# Patient Record
Sex: FEMALE | Race: WHITE | URBAN - METROPOLITAN AREA
[De-identification: names, ages, dates, MRNs, and addresses within clinical notes are randomized per-mention and may not be internally consistent; named-entity substitution may affect disease eponyms.]

---

## 2017-01-01 ENCOUNTER — NURSING HOME VISIT (OUTPATIENT)
Dept: GERIATRICS | Facility: CLINIC | Age: 82
End: 2017-01-01
Payer: COMMERCIAL

## 2017-01-01 ENCOUNTER — APPOINTMENT (OUTPATIENT)
Dept: GENERAL RADIOLOGY | Facility: CLINIC | Age: 82
End: 2017-01-01
Attending: EMERGENCY MEDICINE
Payer: COMMERCIAL

## 2017-01-01 ENCOUNTER — APPOINTMENT (OUTPATIENT)
Dept: OCCUPATIONAL THERAPY | Facility: CLINIC | Age: 82
DRG: 329 | End: 2017-01-01
Attending: INTERNAL MEDICINE
Payer: COMMERCIAL

## 2017-01-01 ENCOUNTER — TRANSFERRED RECORDS (OUTPATIENT)
Dept: HEALTH INFORMATION MANAGEMENT | Facility: CLINIC | Age: 82
End: 2017-01-01

## 2017-01-01 ENCOUNTER — APPOINTMENT (OUTPATIENT)
Dept: CARDIOLOGY | Facility: CLINIC | Age: 82
DRG: 329 | End: 2017-01-01
Attending: INTERNAL MEDICINE
Payer: COMMERCIAL

## 2017-01-01 ENCOUNTER — APPOINTMENT (OUTPATIENT)
Dept: CT IMAGING | Facility: CLINIC | Age: 82
DRG: 329 | End: 2017-01-01
Attending: EMERGENCY MEDICINE
Payer: COMMERCIAL

## 2017-01-01 ENCOUNTER — APPOINTMENT (OUTPATIENT)
Dept: PHYSICAL THERAPY | Facility: CLINIC | Age: 82
DRG: 329 | End: 2017-01-01
Payer: COMMERCIAL

## 2017-01-01 ENCOUNTER — ANESTHESIA EVENT (OUTPATIENT)
Dept: SURGERY | Facility: CLINIC | Age: 82
DRG: 329 | End: 2017-01-01
Payer: COMMERCIAL

## 2017-01-01 ENCOUNTER — HOSPITAL ENCOUNTER (EMERGENCY)
Facility: CLINIC | Age: 82
Discharge: HOME | End: 2017-12-06
Attending: EMERGENCY MEDICINE | Admitting: EMERGENCY MEDICINE
Payer: COMMERCIAL

## 2017-01-01 ENCOUNTER — ANESTHESIA (OUTPATIENT)
Dept: SURGERY | Facility: CLINIC | Age: 82
DRG: 329 | End: 2017-01-01
Payer: COMMERCIAL

## 2017-01-01 ENCOUNTER — HOSPITAL ENCOUNTER (INPATIENT)
Facility: CLINIC | Age: 82
LOS: 7 days | Discharge: NURSING, REHAB OR LONG-TERM CARE | DRG: 329 | End: 2017-12-27
Attending: EMERGENCY MEDICINE | Admitting: INTERNAL MEDICINE
Payer: COMMERCIAL

## 2017-01-01 ENCOUNTER — APPOINTMENT (OUTPATIENT)
Dept: GENERAL RADIOLOGY | Facility: CLINIC | Age: 82
DRG: 329 | End: 2017-01-01
Attending: EMERGENCY MEDICINE
Payer: COMMERCIAL

## 2017-01-01 ENCOUNTER — APPOINTMENT (OUTPATIENT)
Dept: CT IMAGING | Facility: CLINIC | Age: 82
End: 2017-01-01
Attending: EMERGENCY MEDICINE
Payer: COMMERCIAL

## 2017-01-01 ENCOUNTER — APPOINTMENT (OUTPATIENT)
Dept: GENERAL RADIOLOGY | Facility: CLINIC | Age: 82
DRG: 329 | End: 2017-01-01
Attending: INTERNAL MEDICINE
Payer: COMMERCIAL

## 2017-01-01 ENCOUNTER — APPOINTMENT (OUTPATIENT)
Dept: PHYSICAL THERAPY | Facility: CLINIC | Age: 82
DRG: 329 | End: 2017-01-01
Attending: COLON & RECTAL SURGERY
Payer: COMMERCIAL

## 2017-01-01 VITALS
WEIGHT: 130.2 LBS | RESPIRATION RATE: 18 BRPM | TEMPERATURE: 99.6 F | SYSTOLIC BLOOD PRESSURE: 155 MMHG | HEIGHT: 58 IN | DIASTOLIC BLOOD PRESSURE: 60 MMHG | OXYGEN SATURATION: 92 % | HEART RATE: 97 BPM | BODY MASS INDEX: 27.33 KG/M2

## 2017-01-01 VITALS
DIASTOLIC BLOOD PRESSURE: 68 MMHG | RESPIRATION RATE: 18 BRPM | HEART RATE: 94 BPM | TEMPERATURE: 98.5 F | HEIGHT: 58 IN | OXYGEN SATURATION: 96 % | SYSTOLIC BLOOD PRESSURE: 149 MMHG | WEIGHT: 141.6 LBS | BODY MASS INDEX: 29.72 KG/M2

## 2017-01-01 VITALS
OXYGEN SATURATION: 92 % | DIASTOLIC BLOOD PRESSURE: 61 MMHG | TEMPERATURE: 98 F | WEIGHT: 133.38 LBS | RESPIRATION RATE: 18 BRPM | BODY MASS INDEX: 28 KG/M2 | HEIGHT: 58 IN | HEART RATE: 91 BPM | SYSTOLIC BLOOD PRESSURE: 150 MMHG

## 2017-01-01 VITALS
TEMPERATURE: 97.6 F | HEIGHT: 59 IN | OXYGEN SATURATION: 95 % | HEART RATE: 86 BPM | RESPIRATION RATE: 16 BRPM | SYSTOLIC BLOOD PRESSURE: 131 MMHG | BODY MASS INDEX: 26.21 KG/M2 | DIASTOLIC BLOOD PRESSURE: 68 MMHG | WEIGHT: 130 LBS

## 2017-01-01 DIAGNOSIS — F10.920 ALCOHOLIC INTOXICATION WITHOUT COMPLICATION (H): ICD-10-CM

## 2017-01-01 DIAGNOSIS — M17.12 ARTHRITIS OF LEFT KNEE: ICD-10-CM

## 2017-01-01 DIAGNOSIS — R14.1 FLATULENCE, ERUCTATION AND GAS PAIN: ICD-10-CM

## 2017-01-01 DIAGNOSIS — R53.81 PHYSICAL DECONDITIONING: Primary | ICD-10-CM

## 2017-01-01 DIAGNOSIS — C18.5 MALIGNANT NEOPLASM OF SPLENIC FLEXURE (H): Primary | ICD-10-CM

## 2017-01-01 DIAGNOSIS — I10 ESSENTIAL HYPERTENSION: ICD-10-CM

## 2017-01-01 DIAGNOSIS — R14.3 FLATULENCE, ERUCTATION AND GAS PAIN: ICD-10-CM

## 2017-01-01 DIAGNOSIS — C18.5 MALIGNANT NEOPLASM OF SPLENIC FLEXURE (H): ICD-10-CM

## 2017-01-01 DIAGNOSIS — E43 SEVERE PROTEIN-CALORIE MALNUTRITION (H): ICD-10-CM

## 2017-01-01 DIAGNOSIS — K63.89 COLONIC MASS: ICD-10-CM

## 2017-01-01 DIAGNOSIS — K57.30 DIVERTICULOSIS OF LARGE INTESTINE WITHOUT HEMORRHAGE: ICD-10-CM

## 2017-01-01 DIAGNOSIS — R53.81 PHYSICAL DECONDITIONING: ICD-10-CM

## 2017-01-01 DIAGNOSIS — G47.00 INSOMNIA, UNSPECIFIED TYPE: ICD-10-CM
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
## 2017-01-01 DIAGNOSIS — K56.609 LARGE BOWEL OBSTRUCTION (H): ICD-10-CM

## 2017-01-01 DIAGNOSIS — Z93.3 COLOSTOMY IN PLACE (H): ICD-10-CM

## 2017-01-01 DIAGNOSIS — Z93.3 S/P COLOSTOMY (H): ICD-10-CM

## 2017-01-01 DIAGNOSIS — M79.662 PAIN OF LEFT LOWER LEG: ICD-10-CM

## 2017-01-01 DIAGNOSIS — R30.0 DYSURIA: ICD-10-CM

## 2017-01-01 DIAGNOSIS — R14.2 FLATULENCE, ERUCTATION AND GAS PAIN: ICD-10-CM

## 2017-01-01 DIAGNOSIS — R10.13 EPIGASTRIC ABDOMINAL PAIN: ICD-10-CM

## 2017-01-01 DIAGNOSIS — C19 COLORECTAL CANCER (H): ICD-10-CM

## 2017-01-01 DIAGNOSIS — Z71.89 ADVANCED DIRECTIVES, COUNSELING/DISCUSSION: ICD-10-CM

## 2017-01-01 LAB
ABO + RH BLD: NORMAL
ABO + RH BLD: NORMAL
ALBUMIN SERPL-MCNC: 2.8 G/DL (ref 3.4–5)
ALBUMIN SERPL-MCNC: 3 G/DL (ref 3.4–5)
ALBUMIN UR-MCNC: 30 MG/DL
ALBUMIN UR-MCNC: NEGATIVE MG/DL
ALP SERPL-CCNC: 81 U/L (ref 40–150)
ALP SERPL-CCNC: 90 U/L (ref 40–150)
ALT SERPL W P-5'-P-CCNC: 12 U/L (ref 0–50)
ALT SERPL W P-5'-P-CCNC: 15 U/L (ref 0–50)
ANION GAP SERPL CALCULATED.3IONS-SCNC: 10 MMOL/L (ref 3–14)
ANION GAP SERPL CALCULATED.3IONS-SCNC: 8 MMOL/L (ref 3–14)
APPEARANCE UR: ABNORMAL
APPEARANCE UR: CLEAR
AST SERPL W P-5'-P-CCNC: 10 U/L (ref 0–45)
AST SERPL W P-5'-P-CCNC: 16 U/L (ref 0–45)
BACTERIA #/AREA URNS HPF: ABNORMAL /HPF
BASOPHILS # BLD AUTO: 0 10E9/L (ref 0–0.2)
BASOPHILS # BLD AUTO: 0 10E9/L (ref 0–0.2)
BASOPHILS NFR BLD AUTO: 0.1 %
BASOPHILS NFR BLD AUTO: 0.4 %
BILIRUB SERPL-MCNC: 0.2 MG/DL (ref 0.2–1.3)
BILIRUB SERPL-MCNC: 0.8 MG/DL (ref 0.2–1.3)
BILIRUB UR QL STRIP: NEGATIVE
BILIRUB UR QL STRIP: NEGATIVE
BLD GP AB SCN SERPL QL: NORMAL
BLOOD BANK CMNT PATIENT-IMP: NORMAL
BUN SERPL-MCNC: 13 MG/DL (ref 7–30)
BUN SERPL-MCNC: 15 MG/DL (ref 7–30)
BUN SERPL-MCNC: 18 MG/DL (ref 7–30)
BUN SERPL-MCNC: 5 MG/DL (ref 7–30)
CALCIUM SERPL-MCNC: 7.5 MG/DL (ref 8.5–10.1)
CALCIUM SERPL-MCNC: 8 MG/DL (ref 8.5–10.1)
CALCIUM SERPL-MCNC: 8.4 MG/DL (ref 8.5–10.1)
CALCIUM SERPL-MCNC: 9.4 MG/DL (ref 8.5–10.1)
CEA SERPL-MCNC: 7.1 UG/L (ref 0–2.5)
CHLORIDE SERPL-SCNC: 106 MMOL/L (ref 94–109)
CHLORIDE SERPL-SCNC: 110 MMOL/L (ref 94–109)
CHLORIDE SERPL-SCNC: 114 MMOL/L (ref 94–109)
CHLORIDE SERPLBLD-SCNC: 104 MMOL/L (ref 94–109)
CO2 SERPL-SCNC: 21 MMOL/L (ref 20–32)
CO2 SERPL-SCNC: 22 MMOL/L (ref 20–32)
CO2 SERPL-SCNC: 23 MMOL/L (ref 20–32)
CO2 SERPL-SCNC: 28 MMOL/L (ref 20–32)
COLOR UR AUTO: ABNORMAL
COLOR UR AUTO: YELLOW
COPATH REPORT: NORMAL
COPATH REPORT: NORMAL
CREAT SERPL-MCNC: 0.51 MG/DL (ref 0.52–1.04)
CREAT SERPL-MCNC: 0.58 MG/DL (ref 0.52–1.04)
CREAT SERPL-MCNC: 0.68 MG/DL (ref 0.52–1.04)
CREAT SERPL-MCNC: 0.68 MG/DL (ref 0.52–1.04)
CREAT SERPL-MCNC: 0.72 MG/DL (ref 0.52–1.04)
CREAT SERPL-MCNC: 0.89 MG/DL (ref 0.52–1.04)
DIFFERENTIAL METHOD BLD: ABNORMAL
DIFFERENTIAL METHOD BLD: ABNORMAL
DIFFERENTIAL: ABNORMAL
EOSINOPHIL # BLD AUTO: 0 10E9/L (ref 0–0.7)
EOSINOPHIL # BLD AUTO: 0.2 10E9/L (ref 0–0.7)
EOSINOPHIL NFR BLD AUTO: 0.1 %
EOSINOPHIL NFR BLD AUTO: 2.9 %
ERYTHROCYTE [DISTWIDTH] IN BLOOD BY AUTOMATED COUNT: 17.8 % (ref 10–15)
ERYTHROCYTE [DISTWIDTH] IN BLOOD BY AUTOMATED COUNT: 17.9 % (ref 10–15)
ERYTHROCYTE [DISTWIDTH] IN BLOOD BY AUTOMATED COUNT: 18.1 % (ref 10–15)
ERYTHROCYTE [DISTWIDTH] IN BLOOD BY AUTOMATED COUNT: 19.2 % (ref 10–15)
ETHANOL SERPL-MCNC: 0.14 G/DL
GFR SERPL CREATININE-BSD FRML MDRD: 59 ML/MIN/1.7M2
GFR SERPL CREATININE-BSD FRML MDRD: 76 ML/MIN/1.7M2
GFR SERPL CREATININE-BSD FRML MDRD: 80 ML/MIN/1.7M2
GFR SERPL CREATININE-BSD FRML MDRD: 81 ML/MIN/1.7M2
GFR SERPL CREATININE-BSD FRML MDRD: >90 ML/MIN/1.73M2
GFR SERPL CREATININE-BSD FRML MDRD: >90 ML/MIN/1.7M2
GLUCOSE SERPL-MCNC: 102 MG/DL (ref 70–99)
GLUCOSE SERPL-MCNC: 120 MG/DL (ref 70–99)
GLUCOSE SERPL-MCNC: 140 MG/DL (ref 70–99)
GLUCOSE SERPL-MCNC: 75 MG/DL (ref 70–99)
GLUCOSE SERPL-MCNC: 95 MG/DL (ref 70–99)
GLUCOSE UR STRIP-MCNC: NEGATIVE MG/DL
GLUCOSE UR STRIP-MCNC: NEGATIVE MG/DL
HCT VFR BLD AUTO: 27.6 % (ref 35–47)
HCT VFR BLD AUTO: 31 % (ref 35–47)
HCT VFR BLD AUTO: 34.4 % (ref 35–47)
HCT VFR BLD AUTO: 36.8 % (ref 35–47)
HEMOGLOBIN: 8.4 G/DL (ref 11.7–15.7)
HGB BLD-MCNC: 10.4 G/DL (ref 11.7–15.7)
HGB BLD-MCNC: 11.5 G/DL (ref 11.7–15.7)
HGB BLD-MCNC: 8.8 G/DL (ref 11.7–15.7)
HGB BLD-MCNC: 9.3 G/DL (ref 11.7–15.7)
HGB UR QL STRIP: NEGATIVE
HGB UR QL STRIP: NEGATIVE
IMM GRANULOCYTES # BLD: 0 10E9/L (ref 0–0.4)
IMM GRANULOCYTES # BLD: 0.1 10E9/L (ref 0–0.4)
IMM GRANULOCYTES NFR BLD: 0.4 %
IMM GRANULOCYTES NFR BLD: 0.5 %
INR PPP: 1.02 (ref 0.86–1.14)
INTERPRETATION ECG - MUSE: NORMAL
KETONES UR STRIP-MCNC: NEGATIVE MG/DL
KETONES UR STRIP-MCNC: NEGATIVE MG/DL
LEUKOCYTE ESTERASE UR QL STRIP: ABNORMAL
LEUKOCYTE ESTERASE UR QL STRIP: ABNORMAL
LIPASE SERPL-CCNC: 233 U/L (ref 73–393)
LIPASE SERPL-CCNC: 272 U/L (ref 73–393)
LYMPHOCYTES # BLD AUTO: 1 10E9/L (ref 0.8–5.3)
LYMPHOCYTES # BLD AUTO: 1.8 10E9/L (ref 0.8–5.3)
LYMPHOCYTES NFR BLD AUTO: 23.2 %
LYMPHOCYTES NFR BLD AUTO: 6.8 %
MCH RBC QN AUTO: 22.5 PG (ref 26.5–33)
MCH RBC QN AUTO: 22.9 PG (ref 26.5–33)
MCH RBC QN AUTO: 23 PG (ref 26.5–33)
MCH RBC QN AUTO: 23.5 PG (ref 26.5–33)
MCHC RBC AUTO-ENTMCNC: 30 G/DL (ref 31.5–36.5)
MCHC RBC AUTO-ENTMCNC: 30.2 G/DL (ref 31.5–36.5)
MCHC RBC AUTO-ENTMCNC: 30.4 G/DL (ref 31.5–36.5)
MCHC RBC AUTO-ENTMCNC: 31.3 G/DL (ref 31.5–36.5)
MCV RBC AUTO: 74 FL (ref 78–100)
MCV RBC AUTO: 74 FL (ref 78–100)
MCV RBC AUTO: 76 FL (ref 78–100)
MCV RBC AUTO: 78 FL (ref 78–100)
MONOCYTES # BLD AUTO: 0.5 10E9/L (ref 0–1.3)
MONOCYTES # BLD AUTO: 1 10E9/L (ref 0–1.3)
MONOCYTES NFR BLD AUTO: 6.1 %
MONOCYTES NFR BLD AUTO: 6.8 %
MUCOUS THREADS #/AREA URNS LPF: PRESENT /LPF
NEUTROPHILS # BLD AUTO: 12.7 10E9/L (ref 1.6–8.3)
NEUTROPHILS # BLD AUTO: 5 10E9/L (ref 1.6–8.3)
NEUTROPHILS NFR BLD AUTO: 67 %
NEUTROPHILS NFR BLD AUTO: 85.7 %
NITRATE UR QL: NEGATIVE
NITRATE UR QL: NEGATIVE
NRBC # BLD AUTO: 0 10*3/UL
NRBC # BLD AUTO: 0 10*3/UL
NRBC BLD AUTO-RTO: 0 /100
NRBC BLD AUTO-RTO: 0 /100
PH UR STRIP: 5.5 PH (ref 5–7)
PH UR STRIP: 6 PH (ref 5–7)
PLATELET # BLD AUTO: 239 10E9/L (ref 150–450)
PLATELET # BLD AUTO: 252 10^9/L (ref 150–450)
PLATELET # BLD AUTO: 257 10E9/L (ref 150–450)
PLATELET # BLD AUTO: 280 10E9/L (ref 150–450)
PLATELET # BLD AUTO: 292 10E9/L (ref 150–450)
PLATELET # BLD AUTO: 313 10E9/L (ref 150–450)
PLATELET # BLD AUTO: 335 10E9/L (ref 150–450)
POTASSIUM SERPL-SCNC: 2.7 MMOL/L (ref 3.4–5.3)
POTASSIUM SERPL-SCNC: 3.7 MMOL/L (ref 3.4–5.3)
POTASSIUM SERPL-SCNC: 3.8 MMOL/L (ref 3.4–5.3)
POTASSIUM SERPL-SCNC: 4.2 MMOL/L (ref 3.4–5.3)
PROT SERPL-MCNC: 6.6 G/DL (ref 6.8–8.8)
PROT SERPL-MCNC: 7 G/DL (ref 6.8–8.8)
RBC # BLD AUTO: 3.73 10^12/L (ref 3.8–5.2)
RBC # BLD AUTO: 4.07 10E12/L (ref 3.8–5.2)
RBC # BLD AUTO: 4.42 10E12/L (ref 3.8–5.2)
RBC # BLD AUTO: 5 10E12/L (ref 3.8–5.2)
RBC #/AREA URNS AUTO: 1 /HPF (ref 0–2)
RBC #/AREA URNS AUTO: 2 /HPF (ref 0–2)
SODIUM SERPL-SCNC: 136 MMOL/L (ref 133–144)
SODIUM SERPL-SCNC: 140 MMOL/L (ref 133–144)
SODIUM SERPL-SCNC: 143 MMOL/L (ref 133–144)
SODIUM SERPL-SCNC: 143 MMOL/L (ref 133–144)
SOURCE: ABNORMAL
SOURCE: ABNORMAL
SP GR UR STRIP: 1 (ref 1–1.03)
SP GR UR STRIP: 1 (ref 1–1.03)
SPECIMEN EXP DATE BLD: NORMAL
SQUAMOUS #/AREA URNS AUTO: 24 /HPF (ref 0–1)
SQUAMOUS #/AREA URNS AUTO: 3 /HPF (ref 0–1)
TROPONIN I SERPL-MCNC: 0.02 UG/L (ref 0–0.04)
TROPONIN I SERPL-MCNC: 0.03 UG/L (ref 0–0.04)
UROBILINOGEN UR STRIP-MCNC: 2 MG/DL (ref 0–2)
UROBILINOGEN UR STRIP-MCNC: NORMAL MG/DL (ref 0–2)
WBC # BLD AUTO: 10.5 10^9/L (ref 4–11)
WBC # BLD AUTO: 14.8 10E9/L (ref 4–11)
WBC # BLD AUTO: 7.5 10E9/L (ref 4–11)
WBC # BLD AUTO: 9.3 10E9/L (ref 4–11)
WBC #/AREA URNS AUTO: 3 /HPF (ref 0–2)
WBC #/AREA URNS AUTO: 4 /HPF (ref 0–2)

## 2017-01-01 PROCEDURE — 71020 XR CHEST 2 VW: CPT

## 2017-01-01 PROCEDURE — 99223 1ST HOSP IP/OBS HIGH 75: CPT | Mod: AI | Performed by: INTERNAL MEDICINE

## 2017-01-01 PROCEDURE — 88305 TISSUE EXAM BY PATHOLOGIST: CPT | Performed by: COLON & RECTAL SURGERY

## 2017-01-01 PROCEDURE — 80320 DRUG SCREEN QUANTALCOHOLS: CPT | Performed by: EMERGENCY MEDICINE

## 2017-01-01 PROCEDURE — 25000125 ZZHC RX 250: Performed by: NURSE ANESTHETIST, CERTIFIED REGISTERED

## 2017-01-01 PROCEDURE — 25000128 H RX IP 250 OP 636: Performed by: INTERNAL MEDICINE

## 2017-01-01 PROCEDURE — 99207 ZZC CDG-MDM COMPONENT: MEETS MODERATE - UP CODED: CPT | Performed by: INTERNAL MEDICINE

## 2017-01-01 PROCEDURE — 99233 SBSQ HOSP IP/OBS HIGH 50: CPT | Performed by: INTERNAL MEDICINE

## 2017-01-01 PROCEDURE — 36415 COLL VENOUS BLD VENIPUNCTURE: CPT | Performed by: COLON & RECTAL SURGERY

## 2017-01-01 PROCEDURE — 12000007 ZZH R&B INTERMEDIATE

## 2017-01-01 PROCEDURE — 86900 BLOOD TYPING SEROLOGIC ABO: CPT | Performed by: ANESTHESIOLOGY

## 2017-01-01 PROCEDURE — 83690 ASSAY OF LIPASE: CPT | Performed by: EMERGENCY MEDICINE

## 2017-01-01 PROCEDURE — 97116 GAIT TRAINING THERAPY: CPT | Mod: GP | Performed by: PHYSICAL THERAPIST

## 2017-01-01 PROCEDURE — 0DBW4ZX EXCISION OF PERITONEUM, PERCUTANEOUS ENDOSCOPIC APPROACH, DIAGNOSTIC: ICD-10-PCS | Performed by: COLON & RECTAL SURGERY

## 2017-01-01 PROCEDURE — 99212 OFFICE O/P EST SF 10 MIN: CPT

## 2017-01-01 PROCEDURE — 25000128 H RX IP 250 OP 636: Performed by: COLON & RECTAL SURGERY

## 2017-01-01 PROCEDURE — 27210794 ZZH OR GENERAL SUPPLY STERILE: Performed by: COLON & RECTAL SURGERY

## 2017-01-01 PROCEDURE — 40000902 ZZH STATISTIC WOC PT EDUCATION, 16-30 MIN

## 2017-01-01 PROCEDURE — 25000125 ZZHC RX 250: Performed by: INTERNAL MEDICINE

## 2017-01-01 PROCEDURE — 25000125 ZZHC RX 250: Performed by: EMERGENCY MEDICINE

## 2017-01-01 PROCEDURE — 97162 PT EVAL MOD COMPLEX 30 MIN: CPT | Mod: GP | Performed by: PHYSICAL THERAPIST

## 2017-01-01 PROCEDURE — 85025 COMPLETE CBC W/AUTO DIFF WBC: CPT | Performed by: EMERGENCY MEDICINE

## 2017-01-01 PROCEDURE — 40000170 ZZH STATISTIC PRE-PROCEDURE ASSESSMENT II: Performed by: COLON & RECTAL SURGERY

## 2017-01-01 PROCEDURE — 36415 COLL VENOUS BLD VENIPUNCTURE: CPT | Performed by: INTERNAL MEDICINE

## 2017-01-01 PROCEDURE — 84484 ASSAY OF TROPONIN QUANT: CPT | Performed by: EMERGENCY MEDICINE

## 2017-01-01 PROCEDURE — 88342 IMHCHEM/IMCYTCHM 1ST ANTB: CPT | Performed by: COLON & RECTAL SURGERY

## 2017-01-01 PROCEDURE — 36000093 ZZH SURGERY LEVEL 4 1ST 30 MIN: Performed by: COLON & RECTAL SURGERY

## 2017-01-01 PROCEDURE — 96361 HYDRATE IV INFUSION ADD-ON: CPT

## 2017-01-01 PROCEDURE — 71010 XR CHEST PORT 1 VW: CPT

## 2017-01-01 PROCEDURE — 25000125 ZZHC RX 250: Performed by: COLON & RECTAL SURGERY

## 2017-01-01 PROCEDURE — 25000128 H RX IP 250 OP 636: Performed by: ANESTHESIOLOGY

## 2017-01-01 PROCEDURE — 85049 AUTOMATED PLATELET COUNT: CPT | Performed by: COLON & RECTAL SURGERY

## 2017-01-01 PROCEDURE — 25000132 ZZH RX MED GY IP 250 OP 250 PS 637: Performed by: INTERNAL MEDICINE

## 2017-01-01 PROCEDURE — 88360 TUMOR IMMUNOHISTOCHEM/MANUAL: CPT | Performed by: COLON & RECTAL SURGERY

## 2017-01-01 PROCEDURE — 81001 URINALYSIS AUTO W/SCOPE: CPT | Performed by: EMERGENCY MEDICINE

## 2017-01-01 PROCEDURE — 99207 ZZC DOWN CODE DUE TO INITIAL EXAM: CPT | Performed by: INTERNAL MEDICINE

## 2017-01-01 PROCEDURE — 88341 IMHCHEM/IMCYTCHM EA ADD ANTB: CPT | Performed by: COLON & RECTAL SURGERY

## 2017-01-01 PROCEDURE — 81001 URINALYSIS AUTO W/SCOPE: CPT | Mod: XU | Performed by: EMERGENCY MEDICINE

## 2017-01-01 PROCEDURE — 88305 TISSUE EXAM BY PATHOLOGIST: CPT | Mod: 26 | Performed by: COLON & RECTAL SURGERY

## 2017-01-01 PROCEDURE — 25000128 H RX IP 250 OP 636: Performed by: NURSE ANESTHETIST, CERTIFIED REGISTERED

## 2017-01-01 PROCEDURE — 25000128 H RX IP 250 OP 636: Performed by: EMERGENCY MEDICINE

## 2017-01-01 PROCEDURE — 80053 COMPREHEN METABOLIC PANEL: CPT | Performed by: EMERGENCY MEDICINE

## 2017-01-01 PROCEDURE — 25000566 ZZH SEVOFLURANE, EA 15 MIN: Performed by: COLON & RECTAL SURGERY

## 2017-01-01 PROCEDURE — 85027 COMPLETE CBC AUTOMATED: CPT | Performed by: INTERNAL MEDICINE

## 2017-01-01 PROCEDURE — 81288 MLH1 GENE: CPT | Performed by: COLON & RECTAL SURGERY

## 2017-01-01 PROCEDURE — 40000193 ZZH STATISTIC PT WARD VISIT: Performed by: PHYSICAL THERAPIST

## 2017-01-01 PROCEDURE — 25000132 ZZH RX MED GY IP 250 OP 250 PS 637

## 2017-01-01 PROCEDURE — 97110 THERAPEUTIC EXERCISES: CPT | Mod: GP

## 2017-01-01 PROCEDURE — 25000132 ZZH RX MED GY IP 250 OP 250 PS 637: Performed by: EMERGENCY MEDICINE

## 2017-01-01 PROCEDURE — 25000132 ZZH RX MED GY IP 250 OP 250 PS 637: Performed by: SURGERY

## 2017-01-01 PROCEDURE — 40000193 ZZH STATISTIC PT WARD VISIT

## 2017-01-01 PROCEDURE — S0028 INJECTION, FAMOTIDINE, 20 MG: HCPCS | Performed by: EMERGENCY MEDICINE

## 2017-01-01 PROCEDURE — 0D1L4Z4 BYPASS TRANSVERSE COLON TO CUTANEOUS, PERCUTANEOUS ENDOSCOPIC APPROACH: ICD-10-PCS | Performed by: COLON & RECTAL SURGERY

## 2017-01-01 PROCEDURE — 85610 PROTHROMBIN TIME: CPT | Performed by: EMERGENCY MEDICINE

## 2017-01-01 PROCEDURE — 40000133 ZZH STATISTIC OT WARD VISIT

## 2017-01-01 PROCEDURE — 97530 THERAPEUTIC ACTIVITIES: CPT | Mod: GO

## 2017-01-01 PROCEDURE — 25000125 ZZHC RX 250: Performed by: ANESTHESIOLOGY

## 2017-01-01 PROCEDURE — 96376 TX/PRO/DX INJ SAME DRUG ADON: CPT

## 2017-01-01 PROCEDURE — 88360 TUMOR IMMUNOHISTOCHEM/MANUAL: CPT | Mod: 26 | Performed by: COLON & RECTAL SURGERY

## 2017-01-01 PROCEDURE — 88342 IMHCHEM/IMCYTCHM 1ST ANTB: CPT | Mod: 26 | Performed by: COLON & RECTAL SURGERY

## 2017-01-01 PROCEDURE — 00000159 ZZHCL STATISTIC H-SEND OUTS PREP: Performed by: COLON & RECTAL SURGERY

## 2017-01-01 PROCEDURE — 96375 TX/PRO/DX INJ NEW DRUG ADDON: CPT

## 2017-01-01 PROCEDURE — 40000983 ZZH STATISTIC HFNC ADULT NON-CPAP

## 2017-01-01 PROCEDURE — 99285 EMERGENCY DEPT VISIT HI MDM: CPT | Mod: 25

## 2017-01-01 PROCEDURE — 82565 ASSAY OF CREATININE: CPT | Performed by: COLON & RECTAL SURGERY

## 2017-01-01 PROCEDURE — 82947 ASSAY GLUCOSE BLOOD QUANT: CPT | Performed by: COLON & RECTAL SURGERY

## 2017-01-01 PROCEDURE — 99232 SBSQ HOSP IP/OBS MODERATE 35: CPT | Performed by: INTERNAL MEDICINE

## 2017-01-01 PROCEDURE — 99213 OFFICE O/P EST LOW 20 MIN: CPT

## 2017-01-01 PROCEDURE — 93306 TTE W/DOPPLER COMPLETE: CPT

## 2017-01-01 PROCEDURE — 93005 ELECTROCARDIOGRAM TRACING: CPT

## 2017-01-01 PROCEDURE — 85018 HEMOGLOBIN: CPT | Performed by: COLON & RECTAL SURGERY

## 2017-01-01 PROCEDURE — 71000012 ZZH RECOVERY PHASE 1 LEVEL 1 FIRST HR: Performed by: COLON & RECTAL SURGERY

## 2017-01-01 PROCEDURE — 93306 TTE W/DOPPLER COMPLETE: CPT | Mod: 26 | Performed by: INTERNAL MEDICINE

## 2017-01-01 PROCEDURE — 74177 CT ABD & PELVIS W/CONTRAST: CPT

## 2017-01-01 PROCEDURE — 97530 THERAPEUTIC ACTIVITIES: CPT | Mod: GP

## 2017-01-01 PROCEDURE — 37000009 ZZH ANESTHESIA TECHNICAL FEE, EACH ADDTL 15 MIN: Performed by: COLON & RECTAL SURGERY

## 2017-01-01 PROCEDURE — 80048 BASIC METABOLIC PNL TOTAL CA: CPT | Performed by: INTERNAL MEDICINE

## 2017-01-01 PROCEDURE — 37000008 ZZH ANESTHESIA TECHNICAL FEE, 1ST 30 MIN: Performed by: COLON & RECTAL SURGERY

## 2017-01-01 PROCEDURE — 99310 SBSQ NF CARE HIGH MDM 45: CPT | Performed by: NURSE PRACTITIONER

## 2017-01-01 PROCEDURE — 97166 OT EVAL MOD COMPLEX 45 MIN: CPT | Mod: GO

## 2017-01-01 PROCEDURE — 25000125 ZZHC RX 250

## 2017-01-01 PROCEDURE — 82378 CARCINOEMBRYONIC ANTIGEN: CPT | Performed by: EMERGENCY MEDICINE

## 2017-01-01 PROCEDURE — 36000063 ZZH SURGERY LEVEL 4 EA 15 ADDTL MIN: Performed by: COLON & RECTAL SURGERY

## 2017-01-01 PROCEDURE — 71000013 ZZH RECOVERY PHASE 1 LEVEL 1 EA ADDTL HR: Performed by: COLON & RECTAL SURGERY

## 2017-01-01 PROCEDURE — 99306 1ST NF CARE HIGH MDM 50: CPT | Performed by: INTERNAL MEDICINE

## 2017-01-01 PROCEDURE — 93010 ELECTROCARDIOGRAM REPORT: CPT | Performed by: INTERNAL MEDICINE

## 2017-01-01 PROCEDURE — 40000141 ZZH STATISTIC PERIPHERAL IV START W/O US GUIDANCE

## 2017-01-01 PROCEDURE — 99211 OFF/OP EST MAY X REQ PHY/QHP: CPT

## 2017-01-01 PROCEDURE — 82378 CARCINOEMBRYONIC ANTIGEN: CPT | Performed by: PHYSICIAN ASSISTANT

## 2017-01-01 PROCEDURE — 00000158 ZZHCL STATISTIC H-FISH PROCESS B/S: Performed by: COLON & RECTAL SURGERY

## 2017-01-01 PROCEDURE — 86901 BLOOD TYPING SEROLOGIC RH(D): CPT | Performed by: ANESTHESIOLOGY

## 2017-01-01 PROCEDURE — 40000901 ZZH STATISTIC WOC PT EDUCATION, 0-15 MIN

## 2017-01-01 PROCEDURE — 99239 HOSP IP/OBS DSCHRG MGMT >30: CPT | Performed by: INTERNAL MEDICINE

## 2017-01-01 PROCEDURE — 88341 IMHCHEM/IMCYTCHM EA ADD ANTB: CPT | Mod: 26 | Performed by: COLON & RECTAL SURGERY

## 2017-01-01 PROCEDURE — 96374 THER/PROPH/DIAG INJ IV PUSH: CPT | Mod: 59

## 2017-01-01 PROCEDURE — 40000275 ZZH STATISTIC RCP TIME EA 10 MIN

## 2017-01-01 PROCEDURE — 27210995 ZZH RX 272: Performed by: COLON & RECTAL SURGERY

## 2017-01-01 PROCEDURE — 70450 CT HEAD/BRAIN W/O DYE: CPT

## 2017-01-01 PROCEDURE — 88377 M/PHMTRC ALYS ISHQUANT/SEMIQ: CPT | Performed by: PATHOLOGY

## 2017-01-01 PROCEDURE — 97530 THERAPEUTIC ACTIVITIES: CPT | Mod: GP | Performed by: PHYSICAL THERAPIST

## 2017-01-01 PROCEDURE — 25000125 ZZHC RX 250: Performed by: PHYSICIAN ASSISTANT

## 2017-01-01 PROCEDURE — 86850 RBC ANTIBODY SCREEN: CPT | Performed by: ANESTHESIOLOGY

## 2017-01-01 PROCEDURE — 97535 SELF CARE MNGMENT TRAINING: CPT | Mod: GO

## 2017-01-01 RX ORDER — PANTOPRAZOLE SODIUM 40 MG/1
40 TABLET, DELAYED RELEASE ORAL
Status: DISCONTINUED | OUTPATIENT
Start: 2017-01-01 | End: 2017-01-01 | Stop reason: HOSPADM

## 2017-01-01 RX ORDER — CIPROFLOXACIN 2 MG/ML
400 INJECTION, SOLUTION INTRAVENOUS SEE ADMIN INSTRUCTIONS
Status: DISCONTINUED | OUTPATIENT
Start: 2017-01-01 | End: 2017-01-01 | Stop reason: HOSPADM

## 2017-01-01 RX ORDER — CIPROFLOXACIN 2 MG/ML
400 INJECTION, SOLUTION INTRAVENOUS
Status: DISCONTINUED | OUTPATIENT
Start: 2017-01-01 | End: 2017-01-01 | Stop reason: HOSPADM

## 2017-01-01 RX ORDER — IOPAMIDOL 755 MG/ML
78 INJECTION, SOLUTION INTRAVASCULAR ONCE
Status: COMPLETED | OUTPATIENT
Start: 2017-01-01 | End: 2017-01-01

## 2017-01-01 RX ORDER — SODIUM CHLORIDE 9 MG/ML
INJECTION, SOLUTION INTRAVENOUS CONTINUOUS
Status: DISCONTINUED | OUTPATIENT
Start: 2017-01-01 | End: 2017-01-01

## 2017-01-01 RX ORDER — POLYETHYLENE GLYCOL 3350 17 G/17G
17 POWDER, FOR SOLUTION ORAL DAILY PRN
Status: DISCONTINUED | OUTPATIENT
Start: 2017-01-01 | End: 2017-01-01

## 2017-01-01 RX ORDER — ONDANSETRON 2 MG/ML
4 INJECTION INTRAMUSCULAR; INTRAVENOUS EVERY 30 MIN PRN
Status: DISCONTINUED | OUTPATIENT
Start: 2017-01-01 | End: 2017-01-01

## 2017-01-01 RX ORDER — POTASSIUM CHLORIDE 1.5 G/1.58G
20-40 POWDER, FOR SOLUTION ORAL
Status: DISCONTINUED | OUTPATIENT
Start: 2017-01-01 | End: 2017-01-01 | Stop reason: HOSPADM

## 2017-01-01 RX ORDER — BISACODYL 10 MG
10 SUPPOSITORY, RECTAL RECTAL DAILY PRN
Status: DISCONTINUED | OUTPATIENT
Start: 2017-01-01 | End: 2017-01-01

## 2017-01-01 RX ORDER — ACETAMINOPHEN 10 MG/ML
1000 INJECTION, SOLUTION INTRAVENOUS EVERY 6 HOURS
Status: DISCONTINUED | OUTPATIENT
Start: 2017-01-01 | End: 2017-01-01

## 2017-01-01 RX ORDER — POTASSIUM CHLORIDE 7.45 MG/ML
10 INJECTION INTRAVENOUS
Status: DISCONTINUED | OUTPATIENT
Start: 2017-01-01 | End: 2017-01-01 | Stop reason: RX

## 2017-01-01 RX ORDER — MORPHINE SULFATE 4 MG/ML
4 INJECTION, SOLUTION INTRAMUSCULAR; INTRAVENOUS
Status: DISCONTINUED | OUTPATIENT
Start: 2017-01-01 | End: 2017-01-01

## 2017-01-01 RX ORDER — ONDANSETRON 4 MG/1
4 TABLET, ORALLY DISINTEGRATING ORAL EVERY 6 HOURS PRN
Status: DISCONTINUED | OUTPATIENT
Start: 2017-01-01 | End: 2017-01-01 | Stop reason: HOSPADM

## 2017-01-01 RX ORDER — FENTANYL CITRATE 50 UG/ML
INJECTION, SOLUTION INTRAMUSCULAR; INTRAVENOUS PRN
Status: DISCONTINUED | OUTPATIENT
Start: 2017-01-01 | End: 2017-01-01

## 2017-01-01 RX ORDER — NEOSTIGMINE METHYLSULFATE 1 MG/ML
VIAL (ML) INJECTION PRN
Status: DISCONTINUED | OUTPATIENT
Start: 2017-01-01 | End: 2017-01-01

## 2017-01-01 RX ORDER — ACETAMINOPHEN 325 MG/1
650 TABLET ORAL EVERY 4 HOURS PRN
Status: DISCONTINUED | OUTPATIENT
Start: 2017-01-01 | End: 2017-01-01 | Stop reason: HOSPADM

## 2017-01-01 RX ORDER — HYDROMORPHONE HYDROCHLORIDE 1 MG/ML
.3-.5 INJECTION, SOLUTION INTRAMUSCULAR; INTRAVENOUS; SUBCUTANEOUS EVERY 5 MIN PRN
Status: DISCONTINUED | OUTPATIENT
Start: 2017-01-01 | End: 2017-01-01

## 2017-01-01 RX ORDER — HYDROMORPHONE HYDROCHLORIDE 1 MG/ML
0.2 INJECTION, SOLUTION INTRAMUSCULAR; INTRAVENOUS; SUBCUTANEOUS EVERY 5 MIN PRN
Status: DISCONTINUED | OUTPATIENT
Start: 2017-01-01 | End: 2017-01-01 | Stop reason: HOSPADM

## 2017-01-01 RX ORDER — ONDANSETRON 2 MG/ML
4 INJECTION INTRAMUSCULAR; INTRAVENOUS EVERY 6 HOURS PRN
Status: DISCONTINUED | OUTPATIENT
Start: 2017-01-01 | End: 2017-01-01 | Stop reason: HOSPADM

## 2017-01-01 RX ORDER — AMLODIPINE AND OLMESARTAN MEDOXOMIL 10; 40 MG/1; MG/1
1 TABLET ORAL DAILY
COMMUNITY

## 2017-01-01 RX ORDER — POTASSIUM CHLORIDE 29.8 MG/ML
20 INJECTION INTRAVENOUS
Status: DISCONTINUED | OUTPATIENT
Start: 2017-01-01 | End: 2017-01-01 | Stop reason: HOSPADM

## 2017-01-01 RX ORDER — MORPHINE SULFATE 4 MG/ML
4 INJECTION, SOLUTION INTRAMUSCULAR; INTRAVENOUS ONCE
Status: COMPLETED | OUTPATIENT
Start: 2017-01-01 | End: 2017-01-01

## 2017-01-01 RX ORDER — BISACODYL 10 MG
10 SUPPOSITORY, RECTAL RECTAL DAILY PRN
Status: ON HOLD | COMMUNITY
End: 2017-01-01

## 2017-01-01 RX ORDER — PHENAZOPYRIDINE HYDROCHLORIDE 100 MG/1
100 TABLET, FILM COATED ORAL ONCE
Status: COMPLETED | OUTPATIENT
Start: 2017-01-01 | End: 2017-01-01

## 2017-01-01 RX ORDER — MAGNESIUM HYDROXIDE 1200 MG/15ML
LIQUID ORAL PRN
Status: DISCONTINUED | OUTPATIENT
Start: 2017-01-01 | End: 2017-01-01 | Stop reason: HOSPADM

## 2017-01-01 RX ORDER — SIMETHICONE 80 MG
80 TABLET,CHEWABLE ORAL EVERY 6 HOURS PRN
Qty: 180 TABLET | DISCHARGE
Start: 2017-01-01

## 2017-01-01 RX ORDER — ONDANSETRON 2 MG/ML
4 INJECTION INTRAMUSCULAR; INTRAVENOUS EVERY 30 MIN PRN
Status: DISCONTINUED | OUTPATIENT
Start: 2017-01-01 | End: 2017-01-01 | Stop reason: HOSPADM

## 2017-01-01 RX ORDER — LIDOCAINE 40 MG/G
CREAM TOPICAL
Status: DISCONTINUED | OUTPATIENT
Start: 2017-01-01 | End: 2017-01-01 | Stop reason: HOSPADM

## 2017-01-01 RX ORDER — POLYETHYLENE GLYCOL 3350 17 G/17G
17 POWDER, FOR SOLUTION ORAL DAILY
Status: ON HOLD | COMMUNITY
End: 2017-01-01

## 2017-01-01 RX ORDER — POTASSIUM CL/LIDO/0.9 % NACL 10MEQ/0.1L
10 INTRAVENOUS SOLUTION, PIGGYBACK (ML) INTRAVENOUS
Status: DISCONTINUED | OUTPATIENT
Start: 2017-01-01 | End: 2017-01-01 | Stop reason: HOSPADM

## 2017-01-01 RX ORDER — SULFAMETHOXAZOLE/TRIMETHOPRIM 800-160 MG
1 TABLET ORAL ONCE
Status: COMPLETED | OUTPATIENT
Start: 2017-01-01 | End: 2017-01-01

## 2017-01-01 RX ORDER — SODIUM CHLORIDE, SODIUM LACTATE, POTASSIUM CHLORIDE, CALCIUM CHLORIDE 600; 310; 30; 20 MG/100ML; MG/100ML; MG/100ML; MG/100ML
INJECTION, SOLUTION INTRAVENOUS CONTINUOUS
Status: DISCONTINUED | OUTPATIENT
Start: 2017-01-01 | End: 2017-01-01 | Stop reason: HOSPADM

## 2017-01-01 RX ORDER — DEXAMETHASONE SODIUM PHOSPHATE 4 MG/ML
INJECTION, SOLUTION INTRA-ARTICULAR; INTRALESIONAL; INTRAMUSCULAR; INTRAVENOUS; SOFT TISSUE PRN
Status: DISCONTINUED | OUTPATIENT
Start: 2017-01-01 | End: 2017-01-01

## 2017-01-01 RX ORDER — MEPERIDINE HYDROCHLORIDE 25 MG/ML
12.5 INJECTION INTRAMUSCULAR; INTRAVENOUS; SUBCUTANEOUS EVERY 5 MIN PRN
Status: DISCONTINUED | OUTPATIENT
Start: 2017-01-01 | End: 2017-01-01 | Stop reason: HOSPADM

## 2017-01-01 RX ORDER — NALOXONE HYDROCHLORIDE 0.4 MG/ML
.1-.4 INJECTION, SOLUTION INTRAMUSCULAR; INTRAVENOUS; SUBCUTANEOUS
Status: ACTIVE | OUTPATIENT
Start: 2017-01-01 | End: 2017-01-01

## 2017-01-01 RX ORDER — ONDANSETRON 2 MG/ML
INJECTION INTRAMUSCULAR; INTRAVENOUS PRN
Status: DISCONTINUED | OUTPATIENT
Start: 2017-01-01 | End: 2017-01-01

## 2017-01-01 RX ORDER — NALOXONE HYDROCHLORIDE 0.4 MG/ML
.1-.4 INJECTION, SOLUTION INTRAMUSCULAR; INTRAVENOUS; SUBCUTANEOUS
Status: DISCONTINUED | OUTPATIENT
Start: 2017-01-01 | End: 2017-01-01

## 2017-01-01 RX ORDER — HYDRALAZINE HYDROCHLORIDE 20 MG/ML
2.5-5 INJECTION INTRAMUSCULAR; INTRAVENOUS EVERY 10 MIN PRN
Status: DISCONTINUED | OUTPATIENT
Start: 2017-01-01 | End: 2017-01-01 | Stop reason: HOSPADM

## 2017-01-01 RX ORDER — FENTANYL CITRATE 0.05 MG/ML
25-50 INJECTION, SOLUTION INTRAMUSCULAR; INTRAVENOUS
Status: DISCONTINUED | OUTPATIENT
Start: 2017-01-01 | End: 2017-01-01 | Stop reason: HOSPADM

## 2017-01-01 RX ORDER — ACETAMINOPHEN 650 MG/1
650 SUPPOSITORY RECTAL EVERY 4 HOURS PRN
Status: DISCONTINUED | OUTPATIENT
Start: 2017-01-01 | End: 2017-01-01 | Stop reason: HOSPADM

## 2017-01-01 RX ORDER — SIMETHICONE 80 MG
80 TABLET,CHEWABLE ORAL EVERY 6 HOURS PRN
Status: DISCONTINUED | OUTPATIENT
Start: 2017-01-01 | End: 2017-01-01 | Stop reason: HOSPADM

## 2017-01-01 RX ORDER — PROCHLORPERAZINE 25 MG
12.5 SUPPOSITORY, RECTAL RECTAL EVERY 12 HOURS PRN
Status: DISCONTINUED | OUTPATIENT
Start: 2017-01-01 | End: 2017-01-01 | Stop reason: HOSPADM

## 2017-01-01 RX ORDER — ALBUTEROL SULFATE 0.83 MG/ML
2.5 SOLUTION RESPIRATORY (INHALATION) EVERY 4 HOURS PRN
Status: DISCONTINUED | OUTPATIENT
Start: 2017-01-01 | End: 2017-01-01 | Stop reason: HOSPADM

## 2017-01-01 RX ORDER — ACETAMINOPHEN 500 MG
1000 TABLET ORAL EVERY 8 HOURS
Status: DISCONTINUED | OUTPATIENT
Start: 2017-01-01 | End: 2017-01-01 | Stop reason: HOSPADM

## 2017-01-01 RX ORDER — PROPOFOL 10 MG/ML
INJECTION, EMULSION INTRAVENOUS PRN
Status: DISCONTINUED | OUTPATIENT
Start: 2017-01-01 | End: 2017-01-01

## 2017-01-01 RX ORDER — NALOXONE HYDROCHLORIDE 0.4 MG/ML
.1-.4 INJECTION, SOLUTION INTRAMUSCULAR; INTRAVENOUS; SUBCUTANEOUS
Status: DISCONTINUED | OUTPATIENT
Start: 2017-01-01 | End: 2017-01-01 | Stop reason: HOSPADM

## 2017-01-01 RX ORDER — SULFAMETHOXAZOLE/TRIMETHOPRIM 800-160 MG
1 TABLET ORAL 2 TIMES DAILY
Qty: 14 TABLET | Refills: 0 | Status: SHIPPED | OUTPATIENT
Start: 2017-01-01 | End: 2017-01-01

## 2017-01-01 RX ORDER — GLYCOPYRROLATE 0.2 MG/ML
INJECTION, SOLUTION INTRAMUSCULAR; INTRAVENOUS PRN
Status: DISCONTINUED | OUTPATIENT
Start: 2017-01-01 | End: 2017-01-01

## 2017-01-01 RX ORDER — HYDROMORPHONE HYDROCHLORIDE 1 MG/ML
.3-.5 INJECTION, SOLUTION INTRAMUSCULAR; INTRAVENOUS; SUBCUTANEOUS
Status: DISCONTINUED | OUTPATIENT
Start: 2017-01-01 | End: 2017-01-01 | Stop reason: HOSPADM

## 2017-01-01 RX ORDER — LIDOCAINE HYDROCHLORIDE 20 MG/ML
INJECTION, SOLUTION INFILTRATION; PERINEURAL PRN
Status: DISCONTINUED | OUTPATIENT
Start: 2017-01-01 | End: 2017-01-01

## 2017-01-01 RX ORDER — POTASSIUM CHLORIDE 1500 MG/1
20-40 TABLET, EXTENDED RELEASE ORAL
Status: DISCONTINUED | OUTPATIENT
Start: 2017-01-01 | End: 2017-01-01 | Stop reason: HOSPADM

## 2017-01-01 RX ORDER — ALBUTEROL SULFATE 0.83 MG/ML
2.5 SOLUTION RESPIRATORY (INHALATION)
Status: COMPLETED | OUTPATIENT
Start: 2017-01-01 | End: 2017-01-01

## 2017-01-01 RX ORDER — HYDRALAZINE HYDROCHLORIDE 20 MG/ML
10 INJECTION INTRAMUSCULAR; INTRAVENOUS EVERY 4 HOURS PRN
Status: DISCONTINUED | OUTPATIENT
Start: 2017-01-01 | End: 2017-01-01 | Stop reason: HOSPADM

## 2017-01-01 RX ORDER — CIPROFLOXACIN 2 MG/ML
400 INJECTION, SOLUTION INTRAVENOUS EVERY 12 HOURS
Status: COMPLETED | OUTPATIENT
Start: 2017-01-01 | End: 2017-01-01

## 2017-01-01 RX ORDER — ONDANSETRON 4 MG/1
4 TABLET, ORALLY DISINTEGRATING ORAL EVERY 30 MIN PRN
Status: DISCONTINUED | OUTPATIENT
Start: 2017-01-01 | End: 2017-01-01 | Stop reason: HOSPADM

## 2017-01-01 RX ORDER — PROCHLORPERAZINE MALEATE 5 MG
5 TABLET ORAL EVERY 6 HOURS PRN
Status: DISCONTINUED | OUTPATIENT
Start: 2017-01-01 | End: 2017-01-01 | Stop reason: HOSPADM

## 2017-01-01 RX ORDER — LIDOCAINE 40 MG/G
CREAM TOPICAL
Status: DISCONTINUED | OUTPATIENT
Start: 2017-01-01 | End: 2017-01-01

## 2017-01-01 RX ORDER — SODIUM CHLORIDE 9 MG/ML
1000 INJECTION, SOLUTION INTRAVENOUS CONTINUOUS
Status: DISCONTINUED | OUTPATIENT
Start: 2017-01-01 | End: 2017-01-01

## 2017-01-01 RX ADMIN — HYDROMORPHONE HYDROCHLORIDE 0.5 MG: 1 INJECTION, SOLUTION INTRAMUSCULAR; INTRAVENOUS; SUBCUTANEOUS at 20:02

## 2017-01-01 RX ADMIN — HYDROMORPHONE HYDROCHLORIDE 0.5 MG: 1 INJECTION, SOLUTION INTRAMUSCULAR; INTRAVENOUS; SUBCUTANEOUS at 10:53

## 2017-01-01 RX ADMIN — ACETAMINOPHEN 1000 MG: 10 INJECTION, SOLUTION INTRAVENOUS at 02:35

## 2017-01-01 RX ADMIN — METRONIDAZOLE 500 MG: 500 INJECTION, SOLUTION INTRAVENOUS at 06:53

## 2017-01-01 RX ADMIN — ONDANSETRON 4 MG: 2 INJECTION INTRAMUSCULAR; INTRAVENOUS at 22:14

## 2017-01-01 RX ADMIN — CIPROFLOXACIN 400 MG: 2 INJECTION, SOLUTION INTRAVENOUS at 21:18

## 2017-01-01 RX ADMIN — HYDROMORPHONE HYDROCHLORIDE 0.5 MG: 1 INJECTION, SOLUTION INTRAMUSCULAR; INTRAVENOUS; SUBCUTANEOUS at 20:25

## 2017-01-01 RX ADMIN — PANTOPRAZOLE SODIUM 40 MG: 40 INJECTION, POWDER, FOR SOLUTION INTRAVENOUS at 06:47

## 2017-01-01 RX ADMIN — ACETAMINOPHEN 1000 MG: 10 INJECTION, SOLUTION INTRAVENOUS at 23:51

## 2017-01-01 RX ADMIN — SIMETHICONE CHEW TAB 80 MG 80 MG: 80 TABLET ORAL at 22:02

## 2017-01-01 RX ADMIN — ACETAMINOPHEN 1000 MG: 10 INJECTION, SOLUTION INTRAVENOUS at 13:15

## 2017-01-01 RX ADMIN — CISATRACURIUM BESYLATE 2 MG: 2 INJECTION INTRAVENOUS at 21:01

## 2017-01-01 RX ADMIN — HYDROMORPHONE HYDROCHLORIDE 0.5 MG: 1 INJECTION, SOLUTION INTRAMUSCULAR; INTRAVENOUS; SUBCUTANEOUS at 08:48

## 2017-01-01 RX ADMIN — FENTANYL CITRATE 50 MCG: 50 INJECTION, SOLUTION INTRAMUSCULAR; INTRAVENOUS at 20:46

## 2017-01-01 RX ADMIN — DEXMEDETOMIDINE HYDROCHLORIDE 0.3 MCG/KG/HR: 100 INJECTION, SOLUTION INTRAVENOUS at 20:45

## 2017-01-01 RX ADMIN — LIDOCAINE HYDROCHLORIDE 60 MG: 20 INJECTION, SOLUTION INFILTRATION; PERINEURAL at 20:46

## 2017-01-01 RX ADMIN — ACETAMINOPHEN 1000 MG: 500 TABLET, FILM COATED ORAL at 07:35

## 2017-01-01 RX ADMIN — ENOXAPARIN SODIUM 40 MG: 40 INJECTION SUBCUTANEOUS at 18:11

## 2017-01-01 RX ADMIN — PANTOPRAZOLE SODIUM 40 MG: 40 TABLET, DELAYED RELEASE ORAL at 06:41

## 2017-01-01 RX ADMIN — SODIUM CHLORIDE 64 ML: 9 INJECTION, SOLUTION INTRAVENOUS at 10:38

## 2017-01-01 RX ADMIN — SODIUM CHLORIDE: 9 INJECTION, SOLUTION INTRAVENOUS at 02:34

## 2017-01-01 RX ADMIN — SODIUM CHLORIDE, PRESERVATIVE FREE: 5 INJECTION INTRAVENOUS at 22:44

## 2017-01-01 RX ADMIN — ALBUTEROL SULFATE 2.5 MG: 2.5 SOLUTION RESPIRATORY (INHALATION) at 19:53

## 2017-01-01 RX ADMIN — ACETAMINOPHEN 1000 MG: 10 INJECTION, SOLUTION INTRAVENOUS at 01:37

## 2017-01-01 RX ADMIN — ACETAMINOPHEN 1000 MG: 10 INJECTION, SOLUTION INTRAVENOUS at 03:20

## 2017-01-01 RX ADMIN — ACETAMINOPHEN 1000 MG: 10 INJECTION, SOLUTION INTRAVENOUS at 15:07

## 2017-01-01 RX ADMIN — SULFAMETHOXAZOLE AND TRIMETHOPRIM 1 TABLET: 800; 160 TABLET ORAL at 06:46

## 2017-01-01 RX ADMIN — ENOXAPARIN SODIUM 40 MG: 40 INJECTION SUBCUTANEOUS at 16:45

## 2017-01-01 RX ADMIN — MORPHINE SULFATE 4 MG: 4 INJECTION, SOLUTION INTRAMUSCULAR; INTRAVENOUS at 22:43

## 2017-01-01 RX ADMIN — SODIUM CHLORIDE, POTASSIUM CHLORIDE, SODIUM LACTATE AND CALCIUM CHLORIDE: 600; 310; 30; 20 INJECTION, SOLUTION INTRAVENOUS at 22:00

## 2017-01-01 RX ADMIN — ACETAMINOPHEN 1000 MG: 500 TABLET, FILM COATED ORAL at 15:35

## 2017-01-01 RX ADMIN — METRONIDAZOLE 500 MG: 500 INJECTION, SOLUTION INTRAVENOUS at 20:54

## 2017-01-01 RX ADMIN — HYDROMORPHONE HYDROCHLORIDE 0.5 MG: 1 INJECTION, SOLUTION INTRAMUSCULAR; INTRAVENOUS; SUBCUTANEOUS at 23:04

## 2017-01-01 RX ADMIN — HYDROMORPHONE HYDROCHLORIDE 0.5 MG: 1 INJECTION, SOLUTION INTRAMUSCULAR; INTRAVENOUS; SUBCUTANEOUS at 15:12

## 2017-01-01 RX ADMIN — SIMETHICONE CHEW TAB 80 MG 80 MG: 80 TABLET ORAL at 12:12

## 2017-01-01 RX ADMIN — DEXAMETHASONE SODIUM PHOSPHATE 4 MG: 4 INJECTION, SOLUTION INTRA-ARTICULAR; INTRALESIONAL; INTRAMUSCULAR; INTRAVENOUS; SOFT TISSUE at 20:54

## 2017-01-01 RX ADMIN — SODIUM CHLORIDE: 9 INJECTION, SOLUTION INTRAVENOUS at 08:41

## 2017-01-01 RX ADMIN — CIPROFLOXACIN 400 MG: 2 INJECTION, SOLUTION INTRAVENOUS at 09:00

## 2017-01-01 RX ADMIN — DEXMEDETOMIDINE HYDROCHLORIDE 4 MCG: 100 INJECTION, SOLUTION INTRAVENOUS at 20:41

## 2017-01-01 RX ADMIN — ENOXAPARIN SODIUM 40 MG: 40 INJECTION SUBCUTANEOUS at 17:48

## 2017-01-01 RX ADMIN — HYDROMORPHONE HYDROCHLORIDE 0.3 MG: 1 INJECTION, SOLUTION INTRAMUSCULAR; INTRAVENOUS; SUBCUTANEOUS at 03:18

## 2017-01-01 RX ADMIN — PANTOPRAZOLE SODIUM 40 MG: 40 INJECTION, POWDER, FOR SOLUTION INTRAVENOUS at 07:03

## 2017-01-01 RX ADMIN — PANTOPRAZOLE SODIUM 40 MG: 40 INJECTION, POWDER, FOR SOLUTION INTRAVENOUS at 06:58

## 2017-01-01 RX ADMIN — FENTANYL CITRATE 50 MCG: 50 INJECTION, SOLUTION INTRAMUSCULAR; INTRAVENOUS at 21:20

## 2017-01-01 RX ADMIN — PHENAZOPYRIDINE HYDROCHLORIDE 100 MG: 100 TABLET ORAL at 07:39

## 2017-01-01 RX ADMIN — NEOSTIGMINE METHYLSULFATE 3 MG: 1 INJECTION INTRAMUSCULAR; INTRAVENOUS; SUBCUTANEOUS at 22:12

## 2017-01-01 RX ADMIN — ACETAMINOPHEN 1000 MG: 10 INJECTION, SOLUTION INTRAVENOUS at 17:35

## 2017-01-01 RX ADMIN — ACETAMINOPHEN 1000 MG: 500 TABLET, FILM COATED ORAL at 06:41

## 2017-01-01 RX ADMIN — Medication 1 MG: at 22:07

## 2017-01-01 RX ADMIN — ACETAMINOPHEN 1000 MG: 10 INJECTION, SOLUTION INTRAVENOUS at 14:38

## 2017-01-01 RX ADMIN — ACETAMINOPHEN 1000 MG: 10 INJECTION, SOLUTION INTRAVENOUS at 19:40

## 2017-01-01 RX ADMIN — ACETAMINOPHEN 1000 MG: 10 INJECTION, SOLUTION INTRAVENOUS at 21:16

## 2017-01-01 RX ADMIN — ONDANSETRON 4 MG: 2 INJECTION INTRAMUSCULAR; INTRAVENOUS at 10:14

## 2017-01-01 RX ADMIN — ACETAMINOPHEN 1000 MG: 10 INJECTION, SOLUTION INTRAVENOUS at 06:14

## 2017-01-01 RX ADMIN — MORPHINE SULFATE 4 MG: 4 INJECTION, SOLUTION INTRAMUSCULAR; INTRAVENOUS at 10:16

## 2017-01-01 RX ADMIN — LIDOCAINE HYDROCHLORIDE 40 MG: 20 INJECTION, SOLUTION INFILTRATION; PERINEURAL at 22:19

## 2017-01-01 RX ADMIN — PANTOPRAZOLE SODIUM 40 MG: 40 TABLET, DELAYED RELEASE ORAL at 07:29

## 2017-01-01 RX ADMIN — LIDOCAINE HYDROCHLORIDE 10 ML: 20 JELLY TOPICAL at 12:00

## 2017-01-01 RX ADMIN — PHENYLEPHRINE HYDROCHLORIDE 50 MCG: 10 INJECTION INTRAVENOUS at 21:03

## 2017-01-01 RX ADMIN — HYDROMORPHONE HYDROCHLORIDE 0.3 MG: 1 INJECTION, SOLUTION INTRAMUSCULAR; INTRAVENOUS; SUBCUTANEOUS at 02:08

## 2017-01-01 RX ADMIN — SODIUM CHLORIDE: 9 INJECTION, SOLUTION INTRAVENOUS at 02:33

## 2017-01-01 RX ADMIN — ACETAMINOPHEN 1000 MG: 10 INJECTION, SOLUTION INTRAVENOUS at 07:03

## 2017-01-01 RX ADMIN — HYDRALAZINE HYDROCHLORIDE 10 MG: 20 INJECTION INTRAMUSCULAR; INTRAVENOUS at 01:52

## 2017-01-01 RX ADMIN — PANTOPRAZOLE SODIUM 40 MG: 40 INJECTION, POWDER, FOR SOLUTION INTRAVENOUS at 06:20

## 2017-01-01 RX ADMIN — FAMOTIDINE 20 MG: 10 INJECTION, SOLUTION INTRAVENOUS at 04:25

## 2017-01-01 RX ADMIN — DEXMEDETOMIDINE HYDROCHLORIDE 8 MCG: 100 INJECTION, SOLUTION INTRAVENOUS at 21:24

## 2017-01-01 RX ADMIN — HYDROMORPHONE HYDROCHLORIDE 0.5 MG: 1 INJECTION, SOLUTION INTRAMUSCULAR; INTRAVENOUS; SUBCUTANEOUS at 06:14

## 2017-01-01 RX ADMIN — ENOXAPARIN SODIUM 40 MG: 40 INJECTION SUBCUTANEOUS at 17:36

## 2017-01-01 RX ADMIN — PROPOFOL 120 MG: 10 INJECTION, EMULSION INTRAVENOUS at 20:46

## 2017-01-01 RX ADMIN — CISATRACURIUM BESYLATE 2 MG: 2 INJECTION INTRAVENOUS at 21:37

## 2017-01-01 RX ADMIN — METRONIDAZOLE 500 MG: 500 INJECTION, SOLUTION INTRAVENOUS at 00:50

## 2017-01-01 RX ADMIN — SODIUM CHLORIDE, POTASSIUM CHLORIDE, SODIUM LACTATE AND CALCIUM CHLORIDE: 600; 310; 30; 20 INJECTION, SOLUTION INTRAVENOUS at 19:38

## 2017-01-01 RX ADMIN — SODIUM CHLORIDE, PRESERVATIVE FREE: 5 INJECTION INTRAVENOUS at 08:13

## 2017-01-01 RX ADMIN — SODIUM CHLORIDE, PRESERVATIVE FREE: 5 INJECTION INTRAVENOUS at 17:18

## 2017-01-01 RX ADMIN — METRONIDAZOLE 500 MG: 500 INJECTION, SOLUTION INTRAVENOUS at 15:06

## 2017-01-01 RX ADMIN — ACETAMINOPHEN 1000 MG: 500 TABLET, FILM COATED ORAL at 13:34

## 2017-01-01 RX ADMIN — GLYCOPYRROLATE 0.4 MG: 0.2 INJECTION, SOLUTION INTRAMUSCULAR; INTRAVENOUS at 22:12

## 2017-01-01 RX ADMIN — TOPICAL ANESTHETIC 0.5 ML: 200 SPRAY DENTAL; PERIODONTAL at 12:02

## 2017-01-01 RX ADMIN — LIDOCAINE HYDROCHLORIDE 30 ML: 20 SOLUTION ORAL; TOPICAL at 04:25

## 2017-01-01 RX ADMIN — SODIUM CHLORIDE: 9 INJECTION, SOLUTION INTRAVENOUS at 19:45

## 2017-01-01 RX ADMIN — DEXMEDETOMIDINE HYDROCHLORIDE 4 MCG: 100 INJECTION, SOLUTION INTRAVENOUS at 20:35

## 2017-01-01 RX ADMIN — ACETAMINOPHEN 1000 MG: 10 INJECTION, SOLUTION INTRAVENOUS at 08:38

## 2017-01-01 RX ADMIN — ACETAMINOPHEN 1000 MG: 10 INJECTION, SOLUTION INTRAVENOUS at 09:51

## 2017-01-01 RX ADMIN — SODIUM CHLORIDE: 9 INJECTION, SOLUTION INTRAVENOUS at 14:38

## 2017-01-01 RX ADMIN — IOPAMIDOL 78 ML: 755 INJECTION, SOLUTION INTRAVENOUS at 10:39

## 2017-01-01 RX ADMIN — LIDOCAINE HYDROCHLORIDE 10 ML: 20 JELLY TOPICAL at 19:29

## 2017-01-01 RX ADMIN — ACETAMINOPHEN 1000 MG: 500 TABLET, FILM COATED ORAL at 22:02

## 2017-01-01 RX ADMIN — ACETAMINOPHEN 1000 MG: 500 TABLET, FILM COATED ORAL at 14:17

## 2017-01-01 RX ADMIN — SUCCINYLCHOLINE CHLORIDE 80 MG: 20 INJECTION, SOLUTION INTRAMUSCULAR; INTRAVENOUS at 20:46

## 2017-01-01 RX ADMIN — PANTOPRAZOLE SODIUM 40 MG: 40 INJECTION, POWDER, FOR SOLUTION INTRAVENOUS at 07:05

## 2017-01-01 RX ADMIN — Medication 1 ML: at 06:28

## 2017-01-01 RX ADMIN — MORPHINE SULFATE 4 MG: 4 INJECTION, SOLUTION INTRAMUSCULAR; INTRAVENOUS at 12:00

## 2017-01-01 RX ADMIN — DEXMEDETOMIDINE HYDROCHLORIDE 4 MCG: 100 INJECTION, SOLUTION INTRAVENOUS at 20:37

## 2017-01-01 RX ADMIN — HYDROMORPHONE HYDROCHLORIDE 0.2 MG: 1 INJECTION, SOLUTION INTRAMUSCULAR; INTRAVENOUS; SUBCUTANEOUS at 00:12

## 2017-01-01 RX ADMIN — ACETAMINOPHEN 1000 MG: 500 TABLET, FILM COATED ORAL at 21:54

## 2017-01-01 RX ADMIN — ENOXAPARIN SODIUM 40 MG: 40 INJECTION SUBCUTANEOUS at 17:16

## 2017-01-01 RX ADMIN — CIPROFLOXACIN 400 MG: 2 INJECTION, SOLUTION INTRAVENOUS at 20:58

## 2017-01-01 RX ADMIN — SODIUM CHLORIDE 1000 ML: 9 INJECTION, SOLUTION INTRAVENOUS at 10:12

## 2017-01-01 RX ADMIN — Medication 1 MG: at 00:21

## 2017-01-01 ASSESSMENT — ENCOUNTER SYMPTOMS
VOMITING: 1
DIARRHEA: 0
APPETITE CHANGE: 1
NAUSEA: 1
FEVER: 0
DYSURIA: 0
ABDOMINAL DISTENTION: 1
ABDOMINAL PAIN: 1
HEMATURIA: 0
CONSTIPATION: 1

## 2017-12-06 NOTE — ED AVS SNAPSHOT
Emergency Department    [address] [facility] [address]    Phone:  [phone]    Fax:  [phone]                                       Mary Grace Rivers   MRN: [id number]    Department:   Emergency Department   Date of Visit:  12/6/2017           Patient Information     Date Of Birth          6/14/1925        Your diagnoses for this visit were:     Fall, initial encounter     Alcoholic intoxication without complication (H)     Epigastric abdominal pain     Dysuria        You were seen by Ac Mendoza MD.      Follow-up Information     Follow up with Your doctor at [facility]. Schedule an appointment as soon as possible for a visit in 2 days.        Follow up with  Emergency Department.    Specialty:  EMERGENCY MEDICINE    Why:  If symptoms worsen    Contact information:    [address] [facility] [id number] [phone]        Discharge Instructions         Alcohol Intoxication  Alcohol intoxication occurs when you drink alcohol faster than your liver can remove it from your system. The following facts are important to remember:    It can take 10 minutes or more to start to feel the effects of a drink, so you can easily get more intoxicated than you intended.    One drink may be more than 1 serving of alcohol. Depending on the drink, it can be 2 to 4 servings.    It takes about an hour for your body to metabolize (clear) 1 serving. If you have more than 1 drink, it can take a couple of hours or more.    Many things affect how drinks will affect you, including whether you ve eaten, how fast you drink, your size, how much you normally drink (or not), medicines you take, chronic diseases you have, and gender.  Signs and symptoms of alcohol poisoning  The following are signs and symptoms of alcohol poisoning:  Mild impairment    Reduced inhibitions    Slurred speech    Drowsiness    Decreased fine motor skills  Moderate impairment    Erratic behavior, aggression,  "depression    Impaired judgment    Confusion    Concentration difficulties    Coordination problems  Severe impairment    Vomiting    Seizures    Unconsciousness    Cold, clammy    Slow or irregular breathing    Hypothermia (low body temperature)    Coma  Health effects  Alcohol abuse causes health problems. Sometimes this can happen after only drinking a  little.\" There is no set number of drinks or amount of alcohol that defines too much. The more you drink at one time, and the more frequently you drink determine both the short-term and long-term health effects. It affects all parts of your body and your health, including your:    Brain. Alcohol is a central nervous system depressant. It can damage parts of the brain that affect your balance, memory, thinking, and emotions. It can cause memory loss, blackouts, depression, agitation, sleep cycle changes, and seizures. These changes may or may not be reversible.    Heart and vascular system. Alcohol affects multiple areas. It can damage heart muscle causing cardiomyopathy, which is a weakening and stretching of the heart muscle. This can lead to trouble breathing, an irregular heartbeat, atrial fibrillation, leg swelling, and heart failure. It makes the blood vessels stiffen causing hypertension (high blood pressure). All of these problems increase your risk of having heart attacks or strokes.    Liver. Alcohol causes fat to build up in the liver, affecting its normal function. This increases the risk for hepatitis, leading to abdominal pain, appetite loss, jaundice, bleeding problems, liver fibrosis, and cirrhosis. This in turn can affect your ability to fight off infections, and can cause diabetes. The liver changes prevent it from removing toxins in your blood that can cause encephalopathy. Signs of this are confusion, altered level of consciousness, personality changes, memory loss, seizures, coma, and death.    Pancreas. Alcohol can cause inflammation of the " pancreas, or pancreatitis. This can cause pain in your abdomen, fever, and diabetes.    Immune system. Alcohol weakens your immune system in a number of ways. It suppresses your immune system making it harder to fight off infections and colds. You will also have a higher risk of certain infections like pneumonia and tuberculosis.    Cancer risk. Alcohol raises your risk of cancer of the mouth, esophagus, pharynx, larynx, liver, and breast.    Sexual function. Alcohol abuse can also lead to sexual problems.  Alcohol use during pregnancy may cause permanent damage to the growing baby.  Home care  The following guidelines will help you care for yourself at home:    Don't drink any more alcohol.    Don't drive until all effects of the alcohol have worn off.    Don't operate machinery that can cause injuries.    Get lots of rest over the next few days. Drink plenty of water and other non-alcoholic liquids. Try to eat regular meals.    If you have been drinking heavily on a daily basis, you may go through alcohol withdrawal. The usual symptoms last 3 to 4 days and may include nervousness, shakiness, nausea, sweating, sleeplessness, and can even cause seizures and a serious withdrawal symptom called delirium tremens, or DTs. During this time, it is best that you stay with family or friends who can help and support you. You can also admit yourself to a residential detox program. If your symptoms are severe (seizures, severe shakiness, confusion), contact your doctor or call an ambulance for help (see below).   Follow-up care  If alcohol is a problem in your life, these are some organizations that can help you:    Alcoholics Anonymous offers support through a self-help fellowship. There are no dues or fees. See the Yellow Pages and call for time and place of meetings. Find AA online at www.aa.org.    [employer] offers support to families of alcohol users. Contact 426-248-2231, or online at www.al-anocarie.org.    [employer] [place]  on Alcoholism and Drug Dependence can be reached at 747-597-1513, or online at www.ncadd.org.    There are also inpatient and residential alcohol detox programs. Check the Internet or phonebook Yellow Pages under  Drug Abuse and Treatment Centers.   Call 911  Call 911 if any of these occur:    Trouble breathing or slow irregular breathing    Chest pain    Sudden weakness on one side of your body or sudden trouble speaking    Heavy bleeding or vomiting blood    Very drowsy or trouble awakening    Fainting or loss of consciousness    Rapid heart rate    Seizure  When to seek medical advice  Call your healthcare provider right away if any of these occur:    Severe shakiness     Fever over 100.4  F (38.0  C)    Confusion or hallucinations (seeing, hearing, or feeling things that are not there)    Pain in your upper abdomen that gets worse    Repeated vomiting  Date Last Reviewed: 6/1/2016 2000-2017 [employer]. [address] [place] [address]. All rights reserved. This information is not intended as a substitute for professional medical care. Always follow your healthcare professional's instructions.          Uncertain Causes of Fall  You have had a fall today. But the cause of your fall is not certain. Falls can occur due to slipping, tripping or losing your balance. A fall can also occur from a fainting spell or seizure.  While a fall can happen for a simple reason (tripping over something), falls in elderly people are often caused by a combination of things:    Age-related decline in function with worsening balance, stability, vision, and muscle strength    Chronic illness such as heart arrhythmias, heart valve disease, vascular disease, COPD, diabetes, strokes, arthritis    Effects or side effects of medicines    Dehydration.    Environmental hazards such as uneven or slippery ground, unfamiliar place, obstacles, uneven surfaces, or slippery ground    Situational factors (related to the  activity being done, e.g., rushing to the bathroom)  Because the cause of your fall today is not certain, it is possible that a fainting spell or seizure was the cause. This means that it could happen again, without warning. If you fall again, without a cause, then you should return to this facility promptly to have further tests. Otherwise, follow up with your doctor as explained below.  It is normal to feel sore and tight in your muscles and back the next day, and not just the muscles you initially injured. Remember, all the parts of your body are connected, so while initially one area hurts, the next day another may hurt. Also, when you injure yourself, it causes inflammation, which then causes the muscles to tighten up and hurt more. After the initial worsening, it should gradually improve over the next few days. However, more severe pain should be reported.  Even without a definite head injury, you can still get a concussion. Concussions and even bleeding can still occur, especially if you have had a recent injury or take blood thinner medicine. It is not unusual to have a mild headache and feel tired and even nauseous or dizzy.  Home care    Rest today and resume your normal activities as soon as you are feeling back to normal. It is best to remain with someone who can check on you for the next 24 hours to watch for another episode of falling.    If you were injured during the fall, follow the advice from your doctor regarding care of your injury.     If you become light-headed or dizzy, lie down immediately or sit and lean forward with your head down.    As a precaution, do not drive a car or operate dangerous equipment, do not take a bath alone (use a shower instead) and do not swim alone until you see your doctor. A condition causing fainting or seizures must be ruled out before resuming these activities.    You may use acetaminophen or ibuprofen to control pain, unless another pain medicine was prescribed.  If you have chronic liver or kidney disease or ever had a stomach ulcer or gastrointestinal bleeding, talk with your doctor before using these medicines.    Keep your appointments for any further testing that may have been scheduled for you.  Follow-up care  Follow up with your healthcare provider, or as advised.  If X-rays or CT scan were done, you will be notified if there is a change in the reading, especially if it affects treatment.  Call 911  Call 911 if any of these occur:    Trouble breathing    Confused or difficulty arousing    Fainting or loss of consciousness    Rapid or very slow heart rate    Seizure    Difficulty with speech or vision, weakness of an arm or leg    Difficulty walking or talking, loss of balance, numbness or weakness in one side of your body, facial droop  When to seek medical advice  Call your healthcare provider right away if any of these occur:    Another unexplained fall    Dizziness    Severe headache    Nausea and vomiting    Blood in vomit, stools (black or red color)  Date Last Reviewed: 11/5/2015 2000-2017 [employer]. [address] [place] [address]. All rights reserved. This information is not intended as a substitute for professional medical care. Always follow your healthcare professional's instructions.          Discharge References/Attachments     BLADDER INFECTION, FEMALE (ADULT) (ENGLISH)      24 Hour Appointment Hotline       To make an appointment at any [facility], call [phone] ([phone]). If you don't have a family doctor or clinic, we will help you find one. [facility] are conveniently located to serve the needs of you and your family.             Review of your medicines      START taking        Dose / Directions Last dose taken    sulfamethoxazole-trimethoprim 800-160 MG per tablet   Commonly known as:  BACTRIM DS   Dose:  1 tablet   Quantity:  14 tablet        Take 1 tablet by mouth 2 times daily for 7 days    Refills:  0                Prescriptions were sent or printed at these locations (1 Prescription)                   Other Prescriptions                Printed at Department/Unit printer (1 of 1)         sulfamethoxazole-trimethoprim (BACTRIM DS) 800-160 MG per tablet                Procedures and tests performed during your visit     Alcohol level blood    CBC with platelets differential    Chest XR,  PA & LAT    Comprehensive metabolic panel    EKG 12-lead, tracing only    Head CT w/o contrast    Lipase    Troponin I    Troponin I (now)    UA with Microscopic      Orders Needing Specimen Collection     None      Pending Results     No orders found from 12/4/2017 to 12/7/2017.            Pending Culture Results     No orders found from 12/4/2017 to 12/7/2017.            Pending Results Instructions     If you had any lab results that were not finalized at the time of your Discharge, you can call the ED Lab Result RN at [phone]. You will be contacted by this team for any positive Lab results or changes in treatment. The nurses are available 7 days a week from 10A to 6:30P.  You can leave a message 24 hours per day and they will return your call.        Test Results From Your Hospital Stay        12/6/2017  4:01 AM      Component Results     Component Value Ref Range & Units Status    WBC 7.5 4.0 - 11.0 10e9/L Final    RBC Count 4.42 3.8 - 5.2 10e12/L Final    Hemoglobin 10.4 (L) 11.7 - 15.7 g/dL Final    Hematocrit 34.4 (L) 35.0 - 47.0 % Final    MCV 78 78 - 100 fl Final    MCH 23.5 (L) 26.5 - 33.0 pg Final    MCHC 30.2 (L) 31.5 - 36.5 g/dL Final    RDW 17.8 (H) 10.0 - 15.0 % Final    Platelet Count 292 150 - 450 10e9/L Final    Diff Method Automated Method  Final    % Neutrophils 67.0 % Final    % Lymphocytes 23.2 % Final    % Monocytes 6.1 % Final    % Eosinophils 2.9 % Final    % Basophils 0.4 % Final    % Immature Granulocytes 0.4 % Final    Nucleated RBCs 0 0 /100 Final    Absolute Neutrophil 5.0 1.6 -  8.3 10e9/L Final    Absolute Lymphocytes 1.8 0.8 - 5.3 10e9/L Final    Absolute Monocytes 0.5 0.0 - 1.3 10e9/L Final    Absolute Eosinophils 0.2 0.0 - 0.7 10e9/L Final    Absolute Basophils 0.0 0.0 - 0.2 10e9/L Final    Abs Immature Granulocytes 0.0 0 - 0.4 10e9/L Final    Absolute Nucleated RBC 0.0  Final         12/6/2017  4:19 AM      Component Results     Component Value Ref Range & Units Status    Troponin I ES 0.021 0.000 - 0.045 ug/L Final    The 99th percentile for upper reference range is 0.045 ug/L.  Troponin values   in the range of 0.045 - 0.120 ug/L may be associated with risks of adverse   clinical events.           12/6/2017  4:19 AM      Component Results     Component Value Ref Range & Units Status    Sodium 143 133 - 144 mmol/L Final    Potassium 3.8 3.4 - 5.3 mmol/L Final    Chloride 110 (H) 94 - 109 mmol/L Final    Carbon Dioxide 23 20 - 32 mmol/L Final    Anion Gap 10 3 - 14 mmol/L Final    Glucose 95 70 - 99 mg/dL Final    Urea Nitrogen 13 7 - 30 mg/dL Final    Creatinine 0.72 0.52 - 1.04 mg/dL Final    GFR Estimate 76 >60 mL/min/1.7m2 Final    Non  GFR Calc    GFR Estimate If Black >90 >60 mL/min/1.7m2 Final    African American GFR Calc    Calcium 8.4 (L) 8.5 - 10.1 mg/dL Final    Bilirubin Total 0.2 0.2 - 1.3 mg/dL Final    Albumin 3.0 (L) 3.4 - 5.0 g/dL Final    Protein Total 6.6 (L) 6.8 - 8.8 g/dL Final    Alkaline Phosphatase 90 40 - 150 U/L Final    ALT 15 0 - 50 U/L Final    AST 10 0 - 45 U/L Final         12/6/2017  4:55 AM      Component Results     Component Value Ref Range & Units Status    Color Urine Light Yellow  Final    Appearance Urine Slightly Cloudy  Final    Glucose Urine Negative NEG^Negative mg/dL Final    Bilirubin Urine Negative NEG^Negative Final    Ketones Urine Negative NEG^Negative mg/dL Final    Specific Gravity Urine 1.004 1.003 - 1.035 Final    Blood Urine Negative NEG^Negative Final    pH Urine 5.5 5.0 - 7.0 pH Final    Protein Albumin Urine  Negative NEG^Negative mg/dL Final    Urobilinogen mg/dL Normal 0.0 - 2.0 mg/dL Final    Nitrite Urine Negative NEG^Negative Final    Leukocyte Esterase Urine Small (A) NEG^Negative Final    Source Midstream Urine  Final    WBC Urine 4 (H) 0 - 2 /HPF Final    RBC Urine 1 0 - 2 /HPF Final    Bacteria Urine Few (A) NEG^Negative /HPF Final    Squamous Epithelial /HPF Urine 3 (H) 0 - 1 /HPF Final    Mucous Urine Present (A) NEG^Negative /LPF Final         12/6/2017  4:14 AM      Component Results     Component Value Ref Range & Units Status    Ethanol g/dL 0.14 (H) <0.01 g/dL Final         12/6/2017  5:20 AM      Narrative     CT SCAN OF THE HEAD WITHOUT CONTRAST  12/6/2017 4:13 AM     HISTORY: Fall, confusion.     TECHNIQUE: Axial images of the head and coronal reformations without  IV contrast material. Radiation dose for this scan was reduced using  automated exposure control, adjustment of the mA and/or kV according  to patient size, or iterative reconstruction technique.    COMPARISON: None.    FINDINGS: There is generalized atrophy of the brain. There is low  attenuation in the white matter of the cerebral hemispheres consistent  with sequelae of small vessel ischemic disease. There is no evidence  of intracranial hemorrhage, mass, acute infarct or anomaly.     Mild ethmoid and maxillary sinus disease. There is no evidence of  trauma.        Impression     IMPRESSION: No acute abnormality.    ADILSON BELTRÁN MD         12/6/2017  5:22 AM      Narrative     XR CHEST 2 VW  12/6/2017 4:38 AM      HISTORY: Upper abdominal pain.     COMPARISON: 12/7/2009.    FINDINGS: The heart is at the upper limits of normal in size without  pulmonary edema. The thoracic aorta is calcified and mildly tortuous.  The lungs are clear. No pneumothorax or pleural effusion. Surgical  clips in the left breast.        Impression     IMPRESSION: No acute abnormality.    ADILSON BELTRÁN MD         12/6/2017  4:29 AM      Component Results      Component Value Ref Range & Units Status    Lipase 272 73 - 393 U/L Final         12/6/2017  6:21 AM      Component Results     Component Value Ref Range & Units Status    Troponin I ES 0.026 0.000 - 0.045 ug/L Final    The 99th percentile for upper reference range is 0.045 ug/L.  Troponin values   in the range of 0.045 - 0.120 ug/L may be associated with risks of adverse   clinical events.                  Clinical Quality Measure: Blood Pressure Screening     Your blood pressure was checked while you were in the emergency department today. The last reading we obtained was  BP: 131/68 . Please read the guidelines below about what these numbers mean and what you should do about them.  If your systolic blood pressure (the top number) is less than 120 and your diastolic blood pressure (the bottom number) is less than 80, then your blood pressure is normal. There is nothing more that you need to do about it.  If your systolic blood pressure (the top number) is 120-139 or your diastolic blood pressure (the bottom number) is 80-89, your blood pressure may be higher than it should be. You should have your blood pressure rechecked within a year by a primary care provider.  If your systolic blood pressure (the top number) is 140 or greater or your diastolic blood pressure (the bottom number) is 90 or greater, you may have high blood pressure. High blood pressure is treatable, but if left untreated over time it can put you at risk for heart attack, stroke, or kidney failure. You should have your blood pressure rechecked by a primary care provider within the next 4 weeks.  If your provider in the emergency department today gave you specific instructions to follow-up with your doctor or provider even sooner than that, you should follow that instruction and not wait for up to 4 weeks for your follow-up visit.        Thank you for choosing [facility]       Thank you for choosing [facility] for your care. Our goal is always to  "provide you with excellent care. Hearing back from our patients is one way we can continue to improve our services. Please take a few minutes to complete the written survey that you may receive in the mail after you visit with us. Thank you!        [employer] Information     [employer] lets you send messages to your doctor, view your test results, renew your prescriptions, schedule appointments and more. To sign up, go to [email or web address] . Click on \"Log in\" on the left side of the screen, which will take you to the Welcome page. Then click on \"Sign up Now\" on the right side of the page.     You will be asked to enter the access code listed below, as well as some personal information. Please follow the directions to create your username and password.     Your access code is: [email or web address]  Expires: 2018  5:50 PM     Your access code will  in 90 days. If you need help or a new code, please call your [place] clinic or [phone].        Care EveryWhere ID     This is your Care EveryWhere ID. This could be used by other organizations to access your [facility] medical records  [id number]        Equal Access to Services     ISAAC RODRIGUEZ : Hadii wade Rojas, walisa benoit, qamagali kaalanthony goyal, hai marques. So [facility] [phone].    ATENCIÓN: Si habla español, tiene a ovalle disposición servicios gratuitos de asistencia lingüística. Ric al [phone].    We comply with applicable federal civil rights laws and [place] laws. We do not discriminate on the basis of race, color, national origin, age, disability, sex, sexual orientation, or gender identity.            After Visit Summary       This is your record. Keep this with you and show to your community pharmacist(s) and doctor(s) at your next visit.                  "

## 2017-12-06 NOTE — DISCHARGE INSTRUCTIONS
"  Alcohol Intoxication  Alcohol intoxication occurs when you drink alcohol faster than your liver can remove it from your system. The following facts are important to remember:    It can take 10 minutes or more to start to feel the effects of a drink, so you can easily get more intoxicated than you intended.    One drink may be more than 1 serving of alcohol. Depending on the drink, it can be 2 to 4 servings.    It takes about an hour for your body to metabolize (clear) 1 serving. If you have more than 1 drink, it can take a couple of hours or more.    Many things affect how drinks will affect you, including whether you ve eaten, how fast you drink, your size, how much you normally drink (or not), medicines you take, chronic diseases you have, and gender.  Signs and symptoms of alcohol poisoning  The following are signs and symptoms of alcohol poisoning:  Mild impairment    Reduced inhibitions    Slurred speech    Drowsiness    Decreased fine motor skills  Moderate impairment    Erratic behavior, aggression, depression    Impaired judgment    Confusion    Concentration difficulties    Coordination problems  Severe impairment    Vomiting    Seizures    Unconsciousness    Cold, clammy    Slow or irregular breathing    Hypothermia (low body temperature)    Coma  Health effects  Alcohol abuse causes health problems. Sometimes this can happen after only drinking a  little.\" There is no set number of drinks or amount of alcohol that defines too much. The more you drink at one time, and the more frequently you drink determine both the short-term and long-term health effects. It affects all parts of your body and your health, including your:    Brain. Alcohol is a central nervous system depressant. It can damage parts of the brain that affect your balance, memory, thinking, and emotions. It can cause memory loss, blackouts, depression, agitation, sleep cycle changes, and seizures. These changes may or may not be " reversible.    Heart and vascular system. Alcohol affects multiple areas. It can damage heart muscle causing cardiomyopathy, which is a weakening and stretching of the heart muscle. This can lead to trouble breathing, an irregular heartbeat, atrial fibrillation, leg swelling, and heart failure. It makes the blood vessels stiffen causing hypertension (high blood pressure). All of these problems increase your risk of having heart attacks or strokes.    Liver. Alcohol causes fat to build up in the liver, affecting its normal function. This increases the risk for hepatitis, leading to abdominal pain, appetite loss, jaundice, bleeding problems, liver fibrosis, and cirrhosis. This in turn can affect your ability to fight off infections, and can cause diabetes. The liver changes prevent it from removing toxins in your blood that can cause encephalopathy. Signs of this are confusion, altered level of consciousness, personality changes, memory loss, seizures, coma, and death.    Pancreas. Alcohol can cause inflammation of the pancreas, or pancreatitis. This can cause pain in your abdomen, fever, and diabetes.    Immune system. Alcohol weakens your immune system in a number of ways. It suppresses your immune system making it harder to fight off infections and colds. You will also have a higher risk of certain infections like pneumonia and tuberculosis.    Cancer risk. Alcohol raises your risk of cancer of the mouth, esophagus, pharynx, larynx, liver, and breast.    Sexual function. Alcohol abuse can also lead to sexual problems.  Alcohol use during pregnancy may cause permanent damage to the growing baby.  Home care  The following guidelines will help you care for yourself at home:    Don't drink any more alcohol.    Don't drive until all effects of the alcohol have worn off.    Don't operate machinery that can cause injuries.    Get lots of rest over the next few days. Drink plenty of water and other non-alcoholic liquids.  Try to eat regular meals.    If you have been drinking heavily on a daily basis, you may go through alcohol withdrawal. The usual symptoms last 3 to 4 days and may include nervousness, shakiness, nausea, sweating, sleeplessness, and can even cause seizures and a serious withdrawal symptom called delirium tremens, or DTs. During this time, it is best that you stay with family or friends who can help and support you. You can also admit yourself to a residential detox program. If your symptoms are severe (seizures, severe shakiness, confusion), contact your doctor or call an ambulance for help (see below).   Follow-up care  If alcohol is a problem in your life, these are some organizations that can help you:    Alcoholics Anonymous offers support through a self-help fellowship. There are no dues or fees. See the Yellow Pages and call for time and place of meetings. Find AA online at www.aa.org.    [employer] offers support to families of alcohol users. Contact 433-058-4596, or online at www.al-anocarie.org.    National [employer] on Alcoholism and Drug Dependence can be reached at 754-331-8400, or online at www.ncadd.org.    There are also inpatient and residential alcohol detox programs. Check the Internet or phonebook Yellow Pages under  [facility].   Call 911  Call 911 if any of these occur:    Trouble breathing or slow irregular breathing    Chest pain    Sudden weakness on one side of your body or sudden trouble speaking    Heavy bleeding or vomiting blood    Very drowsy or trouble awakening    Fainting or loss of consciousness    Rapid heart rate    Seizure  When to seek medical advice  Call your healthcare provider right away if any of these occur:    Severe shakiness     Fever over 100.4  F (38.0  C)    Confusion or hallucinations (seeing, hearing, or feeling things that are not there)    Pain in your upper abdomen that gets worse    Repeated vomiting  Date Last Reviewed: 6/1/2016 2000-2017 [employer]. [address] [place] [address]. All rights reserved. This information is not intended as a substitute for professional medical care. Always follow your healthcare professional's instructions.          Uncertain Causes of Fall  You have had a fall today. But the cause of your fall is not certain. Falls can occur due to slipping, tripping or losing your balance. A fall can also occur from a fainting spell or seizure.  While a fall can happen for a simple reason (tripping over something), falls in elderly people are often caused by a combination of things:    Age-related decline in function with worsening balance, stability, vision, and muscle strength    Chronic illness such as heart arrhythmias, heart valve disease, vascular disease, COPD, diabetes, strokes, arthritis    Effects or side effects of medicines    Dehydration.    Environmental hazards such as uneven or slippery ground, unfamiliar place, obstacles, uneven surfaces, or slippery ground    Situational factors (related to the activity being done, e.g., rushing to the bathroom)  Because the cause of your fall today is not certain, it is possible that a fainting spell or seizure was the cause. This means that it could happen again, without warning. If you fall again, without a cause, then you should return to this facility promptly to have further tests. Otherwise, follow up with your doctor as explained below.  It is normal to feel sore and tight in your muscles and back the next day, and not just the muscles you initially injured. Remember, all the parts of your body are connected, so while initially one area hurts, the next day another may hurt. Also, when you injure yourself, it causes inflammation, which then causes the muscles to tighten up and hurt more. After the initial worsening, it should gradually improve over the next few days. However, more severe pain should be reported.  Even without a definite head injury, you  can still get a concussion. Concussions and even bleeding can still occur, especially if you have had a recent injury or take blood thinner medicine. It is not unusual to have a mild headache and feel tired and even nauseous or dizzy.  Home care    Rest today and resume your normal activities as soon as you are feeling back to normal. It is best to remain with someone who can check on you for the next 24 hours to watch for another episode of falling.    If you were injured during the fall, follow the advice from your doctor regarding care of your injury.     If you become light-headed or dizzy, lie down immediately or sit and lean forward with your head down.    As a precaution, do not drive a car or operate dangerous equipment, do not take a bath alone (use a shower instead) and do not swim alone until you see your doctor. A condition causing fainting or seizures must be ruled out before resuming these activities.    You may use acetaminophen or ibuprofen to control pain, unless another pain medicine was prescribed. If you have chronic liver or kidney disease or ever had a stomach ulcer or gastrointestinal bleeding, talk with your doctor before using these medicines.    Keep your appointments for any further testing that may have been scheduled for you.  Follow-up care  Follow up with your healthcare provider, or as advised.  If X-rays or CT scan were done, you will be notified if there is a change in the reading, especially if it affects treatment.  Call 911  Call 911 if any of these occur:    Trouble breathing    Confused or difficulty arousing    Fainting or loss of consciousness    Rapid or very slow heart rate    Seizure    Difficulty with speech or vision, weakness of an arm or leg    Difficulty walking or talking, loss of balance, numbness or weakness in one side of your body, facial droop  When to seek medical advice  Call your healthcare provider right away if any of these occur:    Another unexplained  fall    Dizziness    Severe headache    Nausea and vomiting    Blood in vomit, stools (black or red color)  Date Last Reviewed: 11/5/2015 2000-2017 [employer]. [address] [place] [address], [place] [address]. All rights reserved. This information is not intended as a substitute for professional medical care. Always follow your healthcare professional's instructions.

## 2017-12-06 NOTE — ED AVS SNAPSHOT
Emergency Department    [address] [place] [id number]    Phone:  [phone]    Fax:  [phone]                                       Mary Grace Rivers   MRN: [id number]    Department:   Emergency Department   Date of Visit:  12/6/2017           After Visit Summary Signature Page     I have received my discharge instructions, and my questions have been answered. I have discussed any challenges I see with this plan with the nurse or doctor.    ..........................................................................................................................................  Patient/Patient Representative Signature      ..........................................................................................................................................  Patient Representative Print Name and Relationship to Patient    ..................................................               ................................................  Date                                            Time    ..........................................................................................................................................  Reviewed by Signature/Title    ...................................................              ..............................................  Date                                                            Time

## 2017-12-06 NOTE — ED PROVIDER NOTES
"  History     Chief Complaint:  Fall & Alcohol intoxication     HPI     The history is limited secondary to intoxicated patient.     Mary Grace Rivers is a 89-year-old female who presents via EMS for evaluation of a fall after drinking lukasz tonight. The patient states that she remembers waking up on the floor. She states that she maybe was on the way to the bathroom as EMS reports that she was incontinent of urine upon arrival. The patient complains of back pain, which is chronic for her, and lower abdominal pain. She denies pleuritic pain, shortness of breath, recent illness. She states that she has been increasingly tired lately.     Allergies:  Penicillins     Medications:    The patient is currently on no regular medications.     Past Medical History:    Depression   Hypertension     Past Surgical History:    Breast surgery   Gyn surgery     Family History:    History reviewed. No pertinent family history.     Social History:  Marital Status: Single   Presents to the ED via EMS   Tobacco Use: Former smoker   Alcohol Use: No     Review of Systems  As stated in the HPI, otherwise negative.     Physical Exam   First Vitals:  BP: 120/64  Heart Rate: 84  Temp: 97.6  F (36.4  C)  Resp: 19  Height: 148.6 cm (4' 10.5\")  Weight: 59 kg (130 lb)  SpO2: 96 %    Physical Exam  Constitutional:  Appears well-developed and well-nourished. Cooperative. Pleasantly confused. Hard of hearing.   HENT:   Head:    Atraumatic.   Mouth/Throat:   Oropharynx is without erythema or exudate. Tacky oral mucosa.   Eyes:    Conjunctivae normal and EOM are normal.      Pupils are equal, round, and reactive to light.   Neck:    Normal range of motion. Neck supple.   Cardiovascular:  Normal rate, regular rhythm, normal heart sounds and radial and    dorsalis pedis pulses are 2+ and symmetric.    Pulmonary/Chest:  Effort normal and breath sounds normal.   Abdominal:   Soft. Bowel sounds are normal.      No splenomegaly or hepatomegaly. No " tenderness. No rebound.   Musculoskeletal:  Normal range of motion. No edema and no tenderness. No midline tenderness or step off neck or back. Chest, pelvis, and extremities stable and non-tender. Small area of ecchymosis on left anteromedial shin, no tenderness.   Neurological:  Alert. Normal strength. No cranial nerve deficit. GCS 15. Gait intact with walker.   Skin:    Skin is warm and dry.   Psychiatric:   Normal mood and affect.      Emergency Department Course   ECG:  @ 0345  Indication: Fall   Vent. Rate 74 bpm. NC interval 160 ms. QRS duration 78 ms. QT/QTc 400/444 ms. P-R-T axis 54 27 70.   Sinus rhythm with premature atrial complexes. Otherwise normal ECG.  Now with sinus arhythmia when compared to previous ECG from 6/6/15   Read @ 0355 by Dr. Mendoza.     Imaging:  CT Head w/o Contrast  No acute abnormality.     XR Chest 2 Views  No acute abnormality.     Radiographic findings were communicated with the patient who voiced understanding of the findings, but remains intoxicated. Daughter to be informed of results when she arrives.    Laboratory:  Blood:  CBC:  WBC 7.5, HGB 10.4,   CMP: Calcium 8.4, Cl 110, Albumin 3.0, Total protein 6.6, otherwise WNL (Creatinine 0.72)   Lipase: 272  EtOH: 0.14  Troponin I: 0.021     Urine:  UA: Slightly cloudy, light yellow urine, Small leukocyte esterase, WBC 4, Few bacteria, Squamous epithelials 3, mucous present, otherwise WNL     Interventions:  (6945) GI Cocktail, 30 mL, PO  (0425) Pepcid, 20 mg, IV      Emergency Department Course:  Nursing notes and vitals reviewed.    (2626) I entered the room with my scribe, obtained the history, and performed an exam of the patient as documented above.    EKG was done, interpretation as above.     A peripheral IV was established. Blood was drawn from the patient. This was sent for laboratory testing, findings above.      Urine sample was obtained and sent for laboratory analysis, findings above.     The patient was sent for  a chest x-ray and head CT while in the emergency department, findings above.        Impression & Plan    Medical Decision Making:   Mary Grace Rivers is a 89-year-old female who presents after an unwitnessed fall in her home. The patient admits to drinking some alcohol tonight. She found herself on the floor in her hallway. She thinks she probably was trying to go to the bathroom, but does not remember falling.       On exam I do not find evidence of traumatic injury. Patient had some confusion. CT scan of the brain does not show evidence of acute abnormality. Lab testing also looks fairly unremarkable as above aside from anemia. The patient denies any bleeding history. There has been no bleeding here. The most recent previous hemoglobin was normal but that was about three years ago. The ECG does not look ischemic. The patient denies any chest pain but did complain of some upper abdominal discomfort. This seemed to resolve with GI cocktail and Pepcid. Initial troponin was normal, but detectable. Because of the possible syncope and the epigastric discomfort, I will order a two-hour troponin.     Of note, the patient is admitted some alcohol. Here, her BRIT is 0.14. I suspect this is contributing to her confusion and possibly to her fall.     The patient also had frequent urination for EMS and had to urinate a few times here. She denies dysuria, but describes low pelvic discomfort. She does have four white cells and bacteria, but epithelial cells are also present. I will treat with bactrim based on symptoms. Culture is pending. Initial and repeat abdominal exams are benign, no fever, no CVA tenderness, I don't think there is a role for emergent imaging.    EMS and the facility where the patient lives have attempted to contact her daughter. We have not been able to reach her. Following the repeat troponin will contact the patient's daughter.     The patient will be signed out to the morning physician at 0600 pending repeat  troponin and arrangements for discharge. Assuming the patient's mental state clears as she becomes sober and the repeat troponin is negative, she will be discharged with primary care physician follow up.       Diagnosis:    ICD-10-CM    1. Fall, initial encounter W19.XXXA    2. Alcoholic intoxication without complication (H) F10.920    3. Epigastric abdominal pain R10.13        Disposition:  Likely discharge    Discharge Medications:  New Prescriptions    SULFAMETHOXAZOLE-TRIMETHOPRIM (BACTRIM DS) 800-160 MG PER TABLET    Take 1 tablet by mouth 2 times daily for 7 days           [facility] EMERGENCY DEPARTMENT    Scribe disclosure:  Travis MONROY, am serving as a scribe on 12/6/2017 at 3:37 AM to personally document services performed by Dr. Mendoza based on my observations and the provider's statements to me.                 Ac Mendoza MD  12/06/17 0619

## 2017-12-20 PROBLEM — K63.89 COLONIC MASS: Status: ACTIVE | Noted: 2017-01-01

## 2017-12-20 NOTE — ED PROVIDER NOTES
History     Chief Complaint:  Abdominal pain    HPI   Mary Grace Rivers is a 89 year old female who presents with abdominal pain. The patient was seen here in the ED following an episode of unconsciousness after drinking alcohol several weeks ago. At that time she was found to have a UTI, and was started on a course of antibiotics. She did not initially handle this well, with many episodes of nausea and vomiting, so her primary care physician switched her to [facility]. She finished her course of this 4 days ago, and since that time has been experiencing multiple symptoms. These include abdominal pain and distension, nausea, vomiting, loss of appetite, and constipation. She has continued making urine however, and denies any urinary symptoms or fever associated with this. The patient reports that she has not been drinking any alcohol since her last visit, and that episode was due to accidentally mixing two liquors. Her daughter also mentions that the patient had a colonoscopy 10 years ago showing a polyp that was never removed.     Allergies:  Penicillins     Medications:    The patient is not currently taking any prescribed medications.    Past Medical History:    Colon polyp  Cancer  Depressive disorder  Hypertension    Past Surgical History:    Breast surgery  Cholecystectomy  Gyn surgery  Orthopedic surgery    Family History:    The patient denies any relevant family medical history.    Social History:  The patient was accompanied to the ED by her daughter and grandson.  Smoking Status: Former  Smokeless Tobacco: No  Alcohol Use: No  Marital Status:  Single      Review of Systems   Constitutional: Positive for appetite change. Negative for fever.   Gastrointestinal: Positive for abdominal distention, abdominal pain, constipation, nausea and vomiting. Negative for diarrhea.   Genitourinary: Negative for dysuria and hematuria.   All other systems reviewed and are negative.    Physical Exam   Vitals:  Patient Vitals for  "the past 24 hrs:   BP Temp Temp src Pulse Heart Rate Resp SpO2 Height Weight   12/20/17 1546 121/56 97.8  F (36.6  C) Oral - 78 16 94 % - -   12/20/17 1345 117/51 97.8  F (36.6  C) Oral 79 79 16 92 % - -   12/20/17 1200 125/55 - - 84 - 16 93 % - -   12/20/17 0929 134/60 98.2  F (36.8  C) Oral 90 - 20 98 % 1.473 m (4' 10\") 58.5 kg (129 lb)     Physical Exam  Constitutional: Elderly white female, occasionally coughing, holding emesis bag.  HENT: No signs of trauma.   Eyes: EOM are normal. Pupils are equal, round, and reactive to light.   Neck: Normal range of motion. No JVD present. No cervical adenopathy.  Cardiovascular: Regular rhythm.  Exam reveals no gallop and no friction rub.    No murmur heard.  Pulmonary/Chest: Bilateral breath sounds normal. Occasional wheezes, rhonchi or rales.  Abdominal: Distended. Decreased bowel sounds. Diffuse tenderness with guarding. No rebound. 1+ Femoral pulses bilaterally  Rectal: No stool.  Musculoskeletal: No edema. No tenderness.   Lymphadenopathy: No lymphadenopathy.   Neurological: Alert and oriented to person, place, and time. Normal strength. Coordination normal.   Skin: Skin is warm and dry. No rash noted. No erythema.     Emergency Department Course     ECG:  Normal sinus rhythm  Normal ECG  Rate 81 bpm. FL interval 144. QRS duration 74. QT/QTc 368/427. P-R-T axes 60 13 77.    Imaging:  Radiology findings were communicated with the patient and family who voiced understanding of the findings.  XR Chest 2 views:  No infiltrates or acute process. Heart and pulmonary vasculature within normal limits. Aortic calcification. Surgical clips project over the lower lateral left hemithorax.   Per Radiologist.     CT Abdomen Pelvis w contrast:  1. Colonic obstruction at the splenic flexure. An underlying mass lesion cannot be excluded.  2. Sigmoid/descending colonic diverticulosis without CT evidence of diverticulitis.  3. Hepatic several small hypodensities which due to their " ill-defined nature are indeterminate and could represent small hepatic metastasis.  4. Left lower lobe pulmonary nonspecific 0.4 cm nodule on the uppermost image.  5. Bilateral renal cysts, two of which appear complex.  Per Radiologist.     Laboratory:  Laboratory findings were communicated with the patient and family who voiced understanding of the findings.  CBC: WBC: 14.8, HGB: 11.5, PLT: 335  INR: 1.02  CMP: Glucose: 120, GFR: 59, Albumin: 2.8 o/w WNL (Creatinine 0.89)  Lipase: 233    Interventions:  1012 Normal Saline 1000 mL IV  1014 Zofran, 4 mg, IV  1016 Morphine, 4 mg, IV  1200 Morphine, 4 mg, IV       Emergency Department Course:  Nursing notes and vitals reviewed.  0951 I had my initial encounter with the patient.  I performed an exam of the patient as documented above.   EKG obtained in the ED, see results above.   IV was inserted and blood was drawn for laboratory testing, results above.  The patient was sent for a XR and CT while in the emergency department, results above.   1145 I updated the patient on their plan of care.  1149 I spoke with Dr. Carter regarding this patient.    I discussed the treatment plan with the patient. They expressed understanding of this plan and consented to admission. I discussed the patient with Dr. Carter, who will admit the patient to a monitored bed for further evaluation and treatment.    Impression & Plan      Medical Decision Making:  Mary Grace Rivers is a 89 year old female who presents to the emergency department today with abdominal pain with nausea and vomiting. This has been going on intermittently for the past few weeks. She was seen on the 6th after she had a fall. She was found to be intoxicated, and also found to have gastritis and an UTI and treated with an antibiotic. This was switched shortly afterwards and she did finish a one week course. Unfortunately over the last few days since finishing the antibiotic her symptoms have persisted. On exam she has a  markedly distended abdomen which is tender. Chest x ray is unremarkable, but CT shows colonic obstruction at the splenic flexure. The patient's daughter states that she had a colonoscopy about 10 years ago and they did find a polyp, but didn't remove it at that time. The patient is being treated with IV fluids, nausea, and pain medicines. I will have an NG placed and we will admit he for further evaluation and treatment.    Diagnosis:    ICD-10-CM    1. Large bowel obstruction K56.609 UA reflex to Microscopic and Culture     Disposition:   Admission    Scribe Disclosure:  Redd MONROY, am serving as a scribe at 9:39 AM on 12/20/2017 to document services personally performed by Kamran Diggs MD, based on my observations and the provider's statements to me.    12/20/2017    EMERGENCY DEPARTMENT       Kamran Diggs MD  12/20/17 3108

## 2017-12-20 NOTE — H&P
History and Physical     Mary Grace Rivers MRN# [id number]   YOB: 1925 Age: 89 year old      Date of Admission:  12/20/2017    Primary care provider: System, Provider Not In          Assessment and Plan:   89 year old female, with PmHx of depression, hypertension, UTI, colon polyp and history of   breast cancer, who presented today with symptoms of abdominal distension, nausea and   vomiting post meals and constipation for 4 days. Workup done on 12/20/17 revealed, CMP significant for Alb 2.8. CBC revealed, Hb 11.5, WBC 14.8 and Plts 335. INR was 1.02. Lipase was normal. EKG on 12/20/17 revealed NSR rate 81/min. CT abd/pelvis on 12/20/17 revealed, colonic obstruction at the splenic flexure. An underlying mass lesion cannot be excluded. Sigmoid/descending colonic diverticulosis, without CT evidence of diverticulitis. Hepatic several small hypodensities, which due to their ill-defined nature are indeterminate and could represent small hepatic metastasis. Left lower lobe pulmonary nonspecific 0.4 cm nodule on the uppermost image. Bilateral renal cysts, two of which appear complex. Chest x-rays on 12/20/17 was normal.     1. Colonic obstruction: ?due to mass at splenic flexure. For [place]-rectal Surgery consult.  Keep NPO. For IV N/Saline. For IV antiemetics prn. For IV dilaudid prn.    2. Hypertension: controlled. Will hold Amlodipine-Olmesartan. For IV hydralazine prn.    3. GERD prophylaxis: for IV protonix 40mg qd.                   Chief Complaint:   Abdominal distension, nausea and vomiting x 4 days.    History is obtained from the patient's daughter         History of Present Illness:   89 year old female, with PmHx of depression, hypertension, UTI, colon polyp and history of   breast cancer, hearing loss, who presented today with symptoms of abdominal distension, nausea and vomiting post meals and constipation for 4 days. The patient fell 2 weeks ago and was diagnosed with a UTI and was treated with an  antibiotic ?name, which according to her daughter made the patient vomit all the time. She was then switched to Ciprofloxacin, which she completed on 12/16/17.  No history of weight loss. The patient had a colonoscopy done about 10 years ago and a colonic polyp was found, which was not removed.  No family history of colon cancer.  No complaints of chest pain, shortness of breath and no palpitation.  No urinary symptoms.  Workup done on 12/20/17 revealed, CMP significant for Alb 2.8. CBC revealed, Hb 11.5, WBC 14.8 and Plts 335. INR was 1.02. EKG on 12/20/17 revealed NSR rate 81/min. CT abd/pelvis on 12/20/17   revealed, colonic obstruction at the splenic flexure. An underlying mass lesion cannot be excluded.  Sigmoid/descending colonic diverticulosis, without CT evidence of diverticulitis. Hepatic several small hypodensities, which due to their ill-defined nature are indeterminate and could represent small hepatic metastasis. Left lower lobe pulmonary nonspecific 0.4 cm nodule on the  uppermost image. Bilateral renal cysts, two of which appear complex. Chest x-rays on  12/20/17 was normal. Attempt was made in the ER to pass an NG tube and this lead to  trauma to her nasal mucosa, with associated bleeding.             Past Medical History:     Past Medical History:   Diagnosis Date     Cancer (H)     breast     Depressive disorder      Hypertension              Past Surgical History:   Lumpectomy for breast cancer, Right knee replacement and cholecystectomy.              Social History:     Social History   Substance Use Topics     Smoking status: Former Smoker     Smokeless tobacco: Never Used     Alcohol use No            Family History:   No family history on file.          Immunizations:     There is no immunization history on file for this patient.         Allergies:     Allergies   Allergen Reactions     Penicillins              Medications:     Prescriptions Prior to Admission   Medication Sig Dispense Refill  "Last Dose     Amlodipine-Olmesartan (MOSES) 10-40 MG TABS Take 1 tablet by mouth daily   12/19/2017 at am     polyethylene glycol (MIRALAX/GLYCOLAX) Packet Take 17 g by mouth daily   12/20/2017 at . Vomited shortler after dose.      bisacodyl (DULCOLAX) 10 MG Suppository Place 10 mg rectally daily as needed for constipation   Past Week at prn     Acetaminophen (TYLENOL PO) Take 325-650 mg by mouth every 4 hours as needed for mild pain or fever   Past Week at prn             Review of Systems:   The 10 point Review of Systems is negative other than noted in the HPI              Physical Exam:     Vitals were reviewed  Patient Vitals for the past 24 hrs:   BP Temp Temp src Pulse Heart Rate Resp SpO2 Height Weight   12/20/17 1546 121/56 97.8  F (36.6  C) Oral - 78 16 94 % - -   12/20/17 1345 117/51 97.8  F (36.6  C) Oral 79 79 16 92 % - -   12/20/17 1200 125/55 - - 84 - 16 93 % - -   12/20/17 0929 134/60 98.2  F (36.8  C) Oral 90 - 20 98 % 1.473 m (4' 10\") 58.5 kg (129 lb)     Constitutional:   Elderly white female, cooperative, no apparent distress, O2 Sats 94% on 2L via NC     Lungs:   Clear to auscultation bilaterally, no crackles or wheezing     Cardiovascular:   S1 and S2, no S3 or S4, and no murmur noted     Abdomen:   Firm, distended+, non-tender, no masses palpated, no hepatosplenomegaly, BS present+     Musculoskeletal:   There is no redness, warmth, or swelling of the joints. No pedal edema                    Data:     Results for orders placed or performed during the hospital encounter of 12/20/17   XR Chest 2 Views    Narrative    CHEST TWO VIEWS  12/20/2017 10:52 AM    HISTORY:  Chest and abdominal pain.    COMPARISON:  12/6/2017      Impression    IMPRESSION:  No infiltrates or acute process. Heart and pulmonary  vasculature within normal limits. Aortic calcification. Surgical clips  project over the lower lateral left hemithorax.     JAZZMINE BELTRAN MD   CT Abdomen Pelvis w Contrast    Narrative    CT " ABDOMEN/PELVIS WITH CONTRAST December 20, 2017 10:42 AM     HISTORY: Distended tender abdomen with vomiting.     CONTRAST DOSE: 78 mL [id number]    TECHNIQUE: Radiation dose for this scan was reduced using automated  exposure control, adjustment of the mA and/or kV according to patient  size, or iterative reconstruction technique.    FINDINGS: Calcified granulomas are noted at the lung bases. However,  the upper most image, there is a nonspecific 0.4 cm left lower lobe  pulmonary nodule. Multiple hepatic hypodensities are noted. Several  these are ill-defined and therefore indeterminate. Intrahepatic  biliary prominence is noted which may be related to  postcholecystectomy state. Multiple bilateral renal cysts are noted.  Thin rim of calcification is noted within a right superior pole cyst.  Hyperdense cyst is noted in the left renal lower pole. A  nonobstructing stone is also noted within the left mid pole. There is  marked fluid dilatation of the colon extending from the cecum to the  splenic flexure suggesting colonic obstruction. Descending and sigmoid  colon are decompressed. No free peritoneal fluid or air is  demonstrated. Sigmoid diverticulosis is noted without adjacent  inflammatory stranding to clearly indicate diverticulitis.      Impression    IMPRESSION:  1. Colonic obstruction at the splenic flexure. An underlying mass  lesion cannot be excluded.  2. Sigmoid/descending colonic diverticulosis without CT evidence of  diverticulitis.  3. Hepatic several small hypodensities which due to their ill-defined  nature are indeterminate and could represent small hepatic metastasis.  4. Left lower lobe pulmonary nonspecific 0.4 cm nodule on the  uppermost image.  5. Bilateral renal cysts, two of which appear complex.    ROSA COLLINS MD   CBC with platelets differential   Result Value Ref Range    WBC 14.8 (H) 4.0 - 11.0 10e9/L    RBC Count 5.00 3.8 - 5.2 10e12/L    Hemoglobin 11.5 (L) 11.7 - 15.7 g/dL     Hematocrit 36.8 35.0 - 47.0 %    MCV 74 (L) 78 - 100 fl    MCH 23.0 (L) 26.5 - 33.0 pg    MCHC 31.3 (L) 31.5 - 36.5 g/dL    RDW 17.9 (H) 10.0 - 15.0 %    Platelet Count 335 150 - 450 10e9/L    Diff Method Automated Method     % Neutrophils 85.7 %    % Lymphocytes 6.8 %    % Monocytes 6.8 %    % Eosinophils 0.1 %    % Basophils 0.1 %    % Immature Granulocytes 0.5 %    Nucleated RBCs 0 0 /100    Absolute Neutrophil 12.7 (H) 1.6 - 8.3 10e9/L    Absolute Lymphocytes 1.0 0.8 - 5.3 10e9/L    Absolute Monocytes 1.0 0.0 - 1.3 10e9/L    Absolute Eosinophils 0.0 0.0 - 0.7 10e9/L    Absolute Basophils 0.0 0.0 - 0.2 10e9/L    Abs Immature Granulocytes 0.1 0 - 0.4 10e9/L    Absolute Nucleated RBC 0.0    INR   Result Value Ref Range    INR 1.02 0.86 - 1.14   Comprehensive metabolic panel   Result Value Ref Range    Sodium 136 133 - 144 mmol/L    Potassium 4.2 3.4 - 5.3 mmol/L    Chloride 106 94 - 109 mmol/L    Carbon Dioxide 22 20 - 32 mmol/L    Anion Gap 8 3 - 14 mmol/L    Glucose 120 (H) 70 - 99 mg/dL    Urea Nitrogen 18 7 - 30 mg/dL    Creatinine 0.89 0.52 - 1.04 mg/dL    GFR Estimate 59 (L) >60 mL/min/1.7m2    GFR Estimate If Black 72 >60 mL/min/1.7m2    Calcium 9.4 8.5 - 10.1 mg/dL    Bilirubin Total 0.8 0.2 - 1.3 mg/dL    Albumin 2.8 (L) 3.4 - 5.0 g/dL    Protein Total 7.0 6.8 - 8.8 g/dL    Alkaline Phosphatase 81 40 - 150 U/L    ALT 12 0 - 50 U/L    AST 16 0 - 45 U/L   Lipase   Result Value Ref Range    Lipase 233 73 - 393 U/L   EKG 12-lead, tracing only   Result Value Ref Range    Interpretation ECG Click View Image link to view waveform and result

## 2017-12-20 NOTE — IP AVS SNAPSHOT
` [place] [address] [facility]: [phone]            Medication Administration Report for Mary Grace Rivers as of 12/27/17 1518   Legend:    Given Hold Not Given Due Canceled Entry Other Actions    Time Time (Time) Time  Time-Action       Inactive    Active    Linked        Medications 12/21/17 12/22/17 12/23/17 12/24/17 12/25/17 12/26/17 12/27/17    acetaminophen (TYLENOL) Suppository 650 mg  Dose: 650 mg Freq: EVERY 4 HOURS PRN Route: RE  PRN Reason: mild pain  Start: 12/20/17 1619   Admin Instructions: Alternate ibuprofen (if ordered) with acetaminophen.  Maximum acetaminophen dose from all sources = 75 mg/kg/day not to exceed 4 grams/day.     1913-Auto Hold        0059-Unhold                acetaminophen (TYLENOL) tablet 1,000 mg  Dose: 1,000 mg Freq: EVERY 8 HOURS Route: PO  Start: 12/25/17 1400   Admin Instructions: IV to PO per pharmacy policy  Maximum acetaminophen dose from all sources = 75 mg/kg/day not to exceed 4 gram         1334 (1,000 mg)-Given       2202 (1,000 mg)-Given        0735 (1,000 mg)-Given       1417 (1,000 mg)-Given       2154 (1,000 mg)-Given        0641 (1,000 mg)-Given       [ ] 1400       [ ] 2200           acetaminophen (TYLENOL) tablet 650 mg  Dose: 650 mg Freq: EVERY 4 HOURS PRN Route: PO  PRN Reason: mild pain  Start: 12/20/17 1619   Admin Instructions: Alternate ibuprofen (if ordered) with acetaminophen.  Maximum acetaminophen dose from all sources = 75 mg/kg/day not to exceed 4 grams/day.     1913-Auto Hold        0059-Unhold                enoxaparin (LOVENOX) injection 40 mg  Dose: 40 mg Freq: EVERY 24 HOURS Route: SC  Start: 12/22/17 1700   Admin Instructions: Check to make sure start date/time is 12-24 hours post op unless documented complication, AND no sooner than 22 hours post op if spinal anesthesia used.   Continue until discharge to home. HOLD if platelet count falls below 50% of baseline or less than 100,000/ L and notify provider.      7003 (61  "mg)-Given        1811 (40 mg)-Given        1716 (40 mg)-Given        1645 (40 mg)-Given        1748 (40 mg)-Given        [ ] 1700           hydrALAZINE (APRESOLINE) injection 10 mg  Dose: 10 mg Freq: EVERY 4 HOURS PRN Route: IV  PRN Comment: give for SBP > 160mmHg  Start: 12/20/17 1620   Admin Instructions: For ordered doses up to 40 mg, give IV Push undiluted over 1 minute.     1913-Auto Hold        0059-Unhold            0152 (10 mg)-Given           HYDROmorphone (PF) (DILAUDID) injection 0.3-0.5 mg  Dose: 0.3-0.5 mg Freq: EVERY 2 HOURS PRN Route: IV  PRN Reason: severe pain  Start: 12/20/17 1620   Admin Instructions: Hold while on PCA.  Start at the lowest dose.  May adjust dose by 0.1 mg every 2 hours as needed for pain control or improvement in physical function.  Hold dose for analgesic side effects.  Notify provider to assess for uncontrolled pain or analgesic side effects.  For ordered doses up to 4 mg give IV Push undiluted. Administer each 2mg over 2-5 minutes.     1913-Auto Hold        0059-Unhold       0318 (0.3 mg)-Given       0614 (0.5 mg)-Given       0848 (0.5 mg)-Given       1053 (0.5 mg)-Given       1512 (0.5 mg)-Given       2002 (0.5 mg)-Given        2025 (0.5 mg)-Given         0208 (0.3 mg)-Given             lactated ringers BOLUS 500 mL  Dose: 500 mL Freq: EVERY 4 HOURS PRN Route: IV  PRN Reason: other  PRN Comment: Give for urine output less than 80 mL/4 hours.  Start: 12/22/17 0102              lidocaine (LMX4) cream  Freq: EVERY 1 HOUR PRN Route: Top  PRN Reason: pain  PRN Comment: with VAD insertion or accessing implanted port.  Start: 12/22/17 0102   Admin Instructions: Do NOT give if patient has a history of allergy to any local anesthetic or any \"joe\" product.   Apply 30 minutes prior to VAD insertion or port access.  MAX Dose:  2.5 g (  of 5 g tube)               lidocaine 1 % 1 mL  Dose: 1 mL Freq: EVERY 1 HOUR PRN Route: OTHER  PRN Comment: mild pain with VAD insertion or accessing " "implanted port  Start: 12/22/17 0102   Admin Instructions: Do NOT give if patient has a history of allergy to any local anesthetic or any \"joe\" product. MAX dose 1 mL subcutaneous OR intradermal in divided doses.               melatonin tablet 1 mg  Dose: 1 mg Freq: AT BEDTIME PRN Route: PO  PRN Reason: sleep  Start: 12/20/17 1619   Admin Instructions: Do not give unless at least 6 hours of uninterrupted sleep is expected.     1913-Auto Hold        0059-Unhold          2207 (1 mg)-Given         0021 (1 mg)-Given           miconazole (MICATIN; MICRO GUARD) 2 % powder  Freq: EVERY 1 HOUR PRN Route: Top  PRN Reason: other  PRN Comment: topical candidiasis  Start: 12/22/17 1055   Admin Instructions: Apply to affected area.                 naloxone (NARCAN) injection 0.1-0.4 mg  Dose: 0.1-0.4 mg Freq: EVERY 2 MIN PRN Route: IV  PRN Reason: opioid reversal  Start: 12/22/17 0102   Admin Instructions: For respiratory rate LESS than or EQUAL to 8.  Partial reversal dose:  0.1 mg titrated q 2 minutes for Analgesia Side Effects Monitoring Sedation Level of 3 (frequently drowsy, arousable, drifts to sleep during conversation).Full reversal dose:  0.4 mg bolus for Analgesia Side Effects Monitoring Sedation Level of 4 (somnolent, minimal or no response to stimulation).  For ordered doses up to 2mg give IVP. Give each 0.4mg over 15 seconds in emergency situations. For non-emergent situations further dilute in 9mL of NS to facilitate titration of response.               ondansetron (ZOFRAN-ODT) ODT tab 4 mg  Dose: 4 mg Freq: EVERY 6 HOURS PRN Route: PO  PRN Reasons: nausea,vomiting  Start: 12/20/17 1619   Admin Instructions: This is Step 1 of nausea and vomiting management.  If nausea not resolved in 15 minutes, go to Step 2 prochlorperazine (COMPAZINE). Do not push through foil backing. Peel back foil and gently remove. Place on tongue immediately. Administration with liquid unnecessary     1913-Auto Hold        0059-Unhold     "           Or  ondansetron (ZOFRAN) injection 4 mg  Dose: 4 mg Freq: EVERY 6 HOURS PRN Route: IV  PRN Reasons: nausea,vomiting  Start: 12/20/17 1619   Admin Instructions: This is Step 1 of nausea and vomiting management.  If nausea not resolved in 15 minutes, go to Step 2 prochlorperazine (COMPAZINE).  Irritant. For ordered doses up to 4 mg, give IV Push undiluted over 2-5 minutes.     1913-Auto Hold        0059-Unhold                pantoprazole (PROTONIX) EC tablet 40 mg  Dose: 40 mg Freq: EVERY MORNING BEFORE BREAKFAST Route: PO  Start: 12/26/17 0730   Admin Instructions: DO NOT CRUSH.  IV to PO per pharmacy policy          0729 (40 mg)-Given        0641 (40 mg)-Given           phenol (CHLORASEPTIC) 1.4 % spray 1 mL  Dose: 1 spray Freq: EVERY 1 HOUR PRN Route: MT  PRN Reason: sore throat  PRN Comment: without fever  Start: 12/22/17 0431     0628 (1 mL)-Given                potassium chloride (KLOR-CON) Packet 20-40 mEq  Dose: 20-40 mEq Freq: EVERY 2 HOURS PRN Route: ORAL OR FEED  PRN Reason: potassium supplementation  Start: 12/20/17 1619   Admin Instructions: Use if unable to tolerate tablets.  If Serum K+ 3.0-3.3, dose = 60 mEq po total dose (40 mEq x1 followed in 2 hours by 20 mEq x1). Recheck K+ level 4 hours after dose and the next AM.  If Serum K+ 2.5-2.9, dose = 80 mEq po total dose (40 mEq Q2H x2). Recheck K+ level 4 hours after dose and the next AM.  If Serum K+ less than 2.5, See IV order.  Dissolve packet contents in 4-8 ounces of cold water or juice.     1913-Auto Hold        0059-Unhold                potassium chloride 10 mEq in 100 mL intermittent infusion with 10 mg lidocaine  Dose: 10 mEq Freq: EVERY 1 HOUR PRN Route: IV  PRN Reason: potassium supplementation  Start: 12/20/17 1619   Admin Instructions: Infuse via PERIPHERAL LINE. Use potassium with lidocaine for pain with peripheral administration.  If Serum K+ 3.0-3.3, dose = 10 mEq/hr x4 doses (40 mEq IV total dose). Recheck K+ level 2 hours  after dose and the next AM.  If Serum K+ less than 3.0, dose = 10 mEq/hr x6 doses (60 mEq IV total dose). Recheck K+ level 2 hours after dose and the next AM.     1913-Auto Hold 0059-Unhold                potassium chloride 20 mEq in 50 mL intermittent infusion  Dose: 20 mEq Freq: EVERY 1 HOUR PRN Route: IV  PRN Reason: potassium supplementation  Start: 12/20/17 1619   Admin Instructions: Infuse via CENTRAL LINE Only. May need EKG if less than 65 kg or on TPN - Max rate is 0.3 mEq/kg/hr for patients not on EKG monitoring.   If Serum K+ 3.0-3.3, dose = 20 mEq/hr x2 doses (40 mEq IV total dose). Recheck K+ level 2 hours after dose and the next AM.  If Serum K+ less than 3.0, dose = 20 mEq/hr x3 doses (60 mEq IV total dose). Recheck K+ level 2 hours after dose and the next AM.     1913-Auto Hold 0059-Unhold                potassium chloride SA (K-DUR/KLOR-CON M) CR tablet 20-40 mEq  Dose: 20-40 mEq Freq: EVERY 2 HOURS PRN Route: PO  PRN Reason: potassium supplementation  Start: 12/20/17 1619   Admin Instructions: Use if able to take PO.   If Serum K+ 3.0-3.3, dose = 60 mEq po total dose (40 mEq x1 followed in 2 hours by 20 mEq x1). Recheck K+ level 4 hours after dose and the next AM.  If Serum K+ 2.5-2.9, dose = 80 mEq po total dose (40 mEq Q2H x2). Recheck K+ level 4 hours after dose and the next AM.  If Serum K+ less than 2.5, See IV order.  DO NOT CRUSH     1913-Auto Hold 0059-Unhold                prochlorperazine (COMPAZINE) injection 5 mg  Dose: 5 mg Freq: EVERY 6 HOURS PRN Route: IV  PRN Reasons: nausea,vomiting  Start: 12/20/17 1620   Admin Instructions: This is Step 2 of nausea and vomiting management. Give if nausea not resolved 15 minutes after giving ondansetron (ZOFRAN). If nausea not resolved in 15 minutes, go to Step 3 metoclopramide (REGLAN), if ordered.  For ordered doses up to 10 mg, give IV Push undiluted. Each 5mg over 1 minute.     1913-Auto Hold        0059-Unhold                Or  prochlorperazine (COMPAZINE) tablet 5 mg  Dose: 5 mg Freq: EVERY 6 HOURS PRN Route: PO  PRN Reason: vomiting  Start: 12/20/17 1620   Admin Instructions: This is Step 2 of nausea and vomiting management. Give if nausea not resolved 15 minutes after giving ondansetron (ZOFRAN). If nausea not resolved in 15 minutes, go to Step 3 metoclopramide (REGLAN), if ordered.     1913-Auto Hold        0059-Unhold               Or  prochlorperazine (COMPAZINE) Suppository 12.5 mg  Dose: 12.5 mg Freq: EVERY 12 HOURS PRN Route: RE  PRN Reasons: nausea,vomiting  Start: 12/20/17 1620   Admin Instructions: This is Step 2 of nausea and vomiting management. Give if nausea not resolved 15 minutes after giving ondansetron (ZOFRAN). If nausea not resolved in 15 minutes, go to Step 3 metoclopramide (REGLAN), if ordered.     1913-Auto Hold        0059-Unhold                simethicone (MYLICON) chewable tablet 80 mg  Dose: 80 mg Freq: EVERY 6 HOURS PRN Route: PO  PRN Reason: cramping  Start: 12/25/17 0832        1212 (80 mg)-Given       2202 (80 mg)-Given             sodium chloride (OCEAN) 0.65 % nasal spray 1-2 spray  Dose: 1-2 spray Freq: EVERY 1 HOUR PRN Route: NA  PRN Reason: other  PRN Comment: dry nasal passages  Start: 12/20/17 2150   Admin Instructions: To affected nostril(s)     1913-Auto Hold        0059-Unhold                sodium chloride (PF) 0.9% PF flush 3 mL  Dose: 3 mL Freq: EVERY 8 HOURS Route: IK  Start: 12/22/17 0102   Admin Instructions: And Q1H PRN, to lock peripheral IV dormant line.      (0234)-Not Given       (0900)-Not Given       (1705)-Not Given        (0054)-Not Given       (1135)-Not Given       (1814)-Not Given        (0056)-Not Given       (0954)-Not Given       (1714)-Not Given        (0316)-Not Given       (1037)-Not Given       (1639)-Not Given               0919 (3 mL)-Given       1748 (3 mL)-Given        0022 (3 mL)-Given       0825 (3 mL)-Given       [ ] 1715           sodium chloride (PF) 0.9% PF  flush 3 mL  Dose: 3 mL Freq: EVERY 1 HOUR PRN Route: IK  PRN Reason: line flush  PRN Comment: for peripheral IV flush post IV meds  Start: 12/22/17 0102             Discontinued Medications  Medications 12/21/17 12/22/17 12/23/17 12/24/17 12/25/17 12/26/17 12/27/17         Rate: 75 mL/hr Freq: CONTINUOUS Route: IV  Start: 12/22/17 0115   End: 12/27/17 0900   Admin Instructions: Change to saline lock when well tolerated.      0233 ( )-New Bag        1438 ( )-New Bag        0234 ( )-New Bag       1100-Stopped [C]       1945 ( )-New Bag        0841 ( )-New Bag         0900-Med Discontinued         Dose: 1,000 mg Freq: EVERY 6 HOURS Route: IV  Last Dose: 1,000 mg (12/25/17 0838)  Start: 12/21/17 2330   End: 12/25/17 0856   Admin Instructions: Continue until patient is no longer NPO and able to take clear liquids and other oral medications.  Maximum acetaminophen dose from all sources = 75 mg/kg/day not to exceed 4 grams/day.     2351 (1,000 mg)-New Bag        0614 (1,000 mg)-New Bag       1315 (1,000 mg)-New Bag       1735 (1,000 mg)-New Bag        0137 (1,000 mg)-New Bag       0703 (1,000 mg)-New Bag       1438 (1,000 mg)-New Bag       1940 (1,000 mg)-New Bag        0235 (1,000 mg)-New Bag       0951 (1,000 mg)-New Bag       1507 (1,000 mg)-New Bag [C]              2116 (1,000 mg)-New Bag        0320 (1,000 mg)-New Bag       0838 (1,000 mg)-New Bag       0856-Med Discontinued           Dose: 40 mg Freq: EVERY MORNING BEFORE BREAKFAST Route: IV  Start: 12/21/17 0730   End: 12/25/17 0857   Admin Instructions: Reconstitute vial with 10mLs Saline and administer IV Push  Irritant. For ordered doses up to 40 mg, give IV Push over 2 minutes when reconstituted with 10mL NS.     0620 (40 mg)-Given              1913-Auto Hold        0059-Unhold       0705 (40 mg)-Given        0703 (40 mg)-Given        0658 (40 mg)-Given        0647 (40 mg)-Given       0857-Med Discontinued           Dose: 3 mL Freq: EVERY 1 HOUR PRN Route:  IK  PRN Reason: line flush  PRN Comment: for peripheral IV flush post IV meds  Start: 12/20/17 1619   End: 12/26/17 1051    1913-Auto Hold        0059-Unhold           1051-Med Discontinued

## 2017-12-20 NOTE — IP AVS SNAPSHOT
Mary Grace Rivers #[id number] (CSN: [id number])  (89 year old F)  (Adm: 17)     [id number]               [place] [address] SURGICAL SPECIALITIES: [phone]            Patient Demographics     Patient Name Sex          Age SSN Address Phone    Mary Grace Rivers Female 1925 (89 year old) [id number]  [facility] [id number] [phone] (Home)  none (Work)  [phone] (Mobile)      Emergency Contact(s)     Name Relation Home Work Mobile    Mili Rivers Daughter [phone] none [phone]      Admission Information     Attending Provider Admitting Provider Admission Type Admission Date/Time    Ryan Carter MD Taiwo, Adeyinka Adetokunbo, MD Emergency 17  0929    Discharge Date Hospital Service Auth/Cert Status Service Area     Hospitalist [facility]    Unit Room/Bed Admission Status       [facility]  Admission (Confirmed)       Admission     Complaint    Colonic mass, LARGE BOWEL OBSTRUCTION, Colon cancer (H)      Hospital Account     Name Acct ID Class Status Primary Coverage    Mary Grace Rivers [id number] Inpatient Open COMMERCIAL - [employer]            Guarantor Account (for Hospital Account #[id number])     Name Relation to Pt Service Area Active? Acct Type    Mary Grace Rivers Self FCS Yes Personal/Family    Address Phone          [address]  [place], [place] [address] [phone](H)  none(O)              Coverage Information (for Hospital Account #[id number])     F/O Payor/Plan Precert #    COMMERCIAL/[employer]     Subscriber Subscriber #    Mary Grace Rivers [id number]    Address Phone    [address]  [place] [address] [phone]                                                INTERAGENCY TRANSFER FORM - PHYSICIAN ORDERS   2017                       [place] [address] SURGICAL SPECIALITIES: [phone]            Attending Provider: Ryan Carter  "MD     Allergies:  Penicillins    Infection:  None   Service:  HOSPITALIST    Ht:  1.473 m (4' 10\")   Wt:  60.5 kg (133 lb 6.1 oz)   Admission Wt:  58.5 kg (129 lb)    BMI:  27.88 kg/m 2   BSA:  1.57 m 2            ED Clinical Impression     Diagnosis Description Comment Added By Time Added    Large bowel obstruction [K56.609] Large bowel obstruction [K56.609]  Kamran Diggs MD 12/20/2017 11:43 AM      Hospital Problems as of 12/27/2017              Priority Class Noted POA    Colonic mass Medium  12/20/2017 Yes    Colon cancer (H) Medium  12/22/2017 Yes      Non-Hospital Problems as of 12/27/2017     None      Code Status History     Date Active Date Inactive Code Status Order ID Comments User Context    This patient has a current code status but no historical code status.      Current Code Status     Date Active Code Status Order ID Comments User Context       12/20/2017  4:20 PM Full Code [id number]  Ryan Carter MD Inpatient          Medication Review      UNREVIEWED medications. Ask your doctor about these medications        Dose / Directions Comments    MOSES 10-40 MG Tabs   Generic drug:  Amlodipine-Olmesartan        Dose:  1 tablet   Take 1 tablet by mouth daily   Refills:  0        bisacodyl 10 MG Suppository   Commonly known as:  DULCOLAX        Dose:  10 mg   Place 10 mg rectally daily as needed for constipation   Refills:  0        polyethylene glycol Packet   Commonly known as:  MIRALAX/GLYCOLAX        Dose:  17 g   Take 17 g by mouth daily   Refills:  0        TYLENOL PO        Dose:  325-650 mg   Take 325-650 mg by mouth every 4 hours as needed for mild pain or fever   Refills:  0          START taking        Dose / Directions Comments    enoxaparin 40 MG/0.4ML injection   Commonly known as:  LOVENOX        Dose:  40 mg   Inject 0.4 mLs (40 mg) Subcutaneous every 24 hours   Quantity:  25 Syringe   Refills:  0                After Care     Advance Diet as Tolerated       Follow " "this diet upon discharge: Low residue               Further instructions from your care team       New RUQ Transverse Loop  Colostomy:   1. Pt will need teaching on Colostomy appliance change and emptying   2. Supplies with patient       1.Change barrier  2x/wk and prn   2. Empty or change closed pouch when 1/3 full of stool/gas  3. Will not harm appliance to get wet during bathing.        Blow dry (on cool setting) cloth tape and backing after bath or shower  4. Iniitally Eat 6 small meals/day. No dietary restrictions related to colostomy  5. Drink plenty of fluids  6. Always carry and emergency appliance system  7. For best results use a odor eliminator (Febreeze) in your bathroom prior to emptying/changing the appliance  8. No heavy lifting, no sit-ups but walk.  9. OK to take steps.       Supplies:  Blu New image 2pc: 20 pouch changes/month  1. Barrier: flextend, convex  cut to fit with tape                           #[id number]  5/bx = 2-4bx/month  2. Pouch: Opaque lock n roll                                                    #[id number] 10/bx = 1-2bx/month OR  3. Pouch: Closed opaque no  filter                                            #[id number] 30/bx = 1-2bx/month  4. Optional if leakage:  Adapt ring                                             #2755   10/bx = 1-2bx/month      Procedure for appliance change:  1. Draw and cut opening in barrier (If cut to fit)  following pattern, approx 2\"  2. Remove plastic backing  3. Optional if leakage:  Open ring. Stretch to approx size of opening in pouch.   4.  Optional: Place full ring around opening on sticky side of pouch. Press into place  5. Fold back edge of tape to create a \"tab\" This assists with paper removal in Step #11  6 Set barrier aside  7. Gently remove pouching system from around stoma. Discard.  8. Clean skin around stoma with disposable wipe or moist gauze. Discard.  9. Pat the skin dry  10. Center stoma in barrier opening. Press barrier to skin  11. Pull on " "\"tabs\" to remove paper that covers the tape. Press tape to skin  12. Close lock n roll closure on drainable pouch.  If using closed pouch it can be changed up to 2x/day      Call for any ?/concerns:  Mirtha RUSSELLObdulio (ostomy nurses) @ [facility]  (C) [phone]  (W) [phone]                  Follow-Up Appointment Instructions     Follow Up and recommended labs and tests       Follow up in the office in 3-4 weeks with Dr. Alarcon or at your already scheduled post op visit. Call [phone] to schedule your appointment. Call the office at [phone] if you develop fever, uncontrolled pain, bleeding, nausea, vomiting, or constipation.                                                 INTERAGENCY TRANSFER FORM - NURSING   12/20/2017                       [place] [address] SURGICAL SPECIALITIES: [phone]            Attending Provider: Ryan Carter MD     Allergies:  Penicillins    Infection:  None   Service:  HOSPITALIST    Ht:  1.473 m (4' 10\")   Wt:  60.5 kg (133 lb 6.1 oz)   Admission Wt:  58.5 kg (129 lb)    BMI:  27.88 kg/m 2   BSA:  1.57 m 2            Advance Directives        Does patient have a scanned Advance Directive/ACP document in EPIC?           No        Immunizations     None      ASSESSMENT     Discharge Profile Flowsheet     EXPECTED DISCHARGE     Patient's communication style  spoken language (English or Bilingual) 12/20/17 0925    Expected Discharge Date  12/27/17 (or 28, tcu) 12/26/17 0755   SKIN      DISCHARGE NEEDS ASSESSMENT     Inspection of bony prominences  Full 12/27/17 0915    Equipment Currently Used at Home  walker, rolling;grab bar;shower chair 12/27/17 1222   Inspection under devices  Full 12/27/17 0915    # of Referrals Placed by CTS  Post Acute Facilities 12/25/17 1238   Skin WDL  ex 12/27/17 0915    GASTROINTESTINAL (ADULT,PEDIATRIC,OB)     Skin Color/Characteristics  redness blanchable 12/27/17 0915    GI WDL  ex (colostomy) 12/27/17 0915   Skin Temperature  " "warm 12/27/17 0915    Abdominal Appearance  rounded 12/25/17 0331   Skin Moisture  dry 12/27/17 0915    All Quadrants Bowel Sounds  audible and normoactive 12/27/17 0915   Skin Integrity  incision(s) 12/27/17 0915    Last Bowel Movement  12/24/17 12/24/17 1742   SAFETY      GI Signs/Symptoms  gas discomfort 12/26/17 1620   Safety WDL  WDL 12/27/17 0915    Passing flatus  yes 12/27/17 0915   Safety Factors  ID band on;upper side rails raised x 2;call light in reach;wheels locked;bed in low position 12/27/17 0318    COMMUNICATION ASSESSMENT     All Alarms  alarm(s) activated and audible 12/27/17 0318                 Assessment WDL (Within Defined Limits) Definitions           Safety WDL     Effective: 09/28/15    Row Information: <b>WDL Definition:</b> Bed in low position, wheels locked; call light in reach; upper side rails up x 2; ID band on<br> <font color=\"gray\"><i>Item=AS safety wdl>>List=AS safety wdl>>Version=F14</i></font>      Skin WDL     Effective: 09/28/15    Row Information: <b>WDL Definition:</b> Warm; dry; intact; elastic; without discoloration; pressure points without redness<br> <font color=\"gray\"><i>Item=AS skin wdl>>List=AS skin wdl>>Version=F14</i></font>      Vitals     Vital Signs Flowsheet     VITAL SIGNS     Nonverbal Indicators of Pain  Moaning;Restless (yelling out) 12/22/17 0756    Temp  98  F (36.7  C) 12/27/17 0737   Pain Management Interventions  Relaxation 12/22/17 0756    Temp src  Oral 12/27/17 0737   Response to Interventions  Relief 12/22/17 0005    Resp  18 12/27/17 0737   ANALGESIA SIDE EFFECTS MONITORING      Pulse  91 12/27/17 0737   Side Effects Monitoring: Respiratory Quality  R 12/26/17 0246    Heart Rate  93 12/27/17 0021   Side Effects Monitoring: Respiratory Depth  N 12/26/17 0246    Pulse/Heart Rate Source  Monitor 12/27/17 0737   Side Effects Monitoring: Sedation Level  S 12/26/17 0246    BP  150/61 12/27/17 0737   HEIGHT AND WEIGHT      BP Location  Right arm 12/27/17 " "0737   Height  1.473 m (4' 10\") 12/20/17 0934    OXYGEN THERAPY     Weight  60.5 kg (133 lb 6.1 oz) 12/27/17 0606    SpO2  92 % 12/27/17 0737   Weight Method  Bed scale 12/27/17 0606    O2 Device  None (Room air) 12/27/17 0737   Bed Scale  Standard (fitted sheet, draw sheet/ pad, cover/flat sheet, blanket, two pillows);Pillow (add comment for number);Fitted sheet;Draw sheet/ pad (add comment for number);Cover/flat sheet (add comment for number);[place] (add comment for number) 12/27/17 0606    FiO2 (%)  55 % 12/22/17 1906   BSA (Calculated - sq m)  1.55 12/20/17 0934    Oxygen Delivery  2 LPM 12/24/17 1906   BMI (Calculated)  27.02 12/20/17 0934    RESPIRATORY MONITORING     JOEY COMA SCALE      Respiratory Monitoring (EtCO2)  33 mmHg 12/22/17 0135   Best Eye Response  4-->(E4) spontaneous 12/26/17 0918    Integrated Pulmonary Index (IPI)  5-6 12/22/17 0135   Best Motor Response  6-->(M6) obeys commands 12/26/17 0918    PAIN/COMFORT     Best Verbal Response  5-->(V5) oriented 12/26/17 0918    Patient Currently in Pain  denies 12/27/17 0021   [place] Coma Scale Score  15 12/26/17 0918    Preferred Pain Scale  number (Numeric Rating Pain Scale) 12/26/17 0909   DAILY CARE      Patient's Stated Pain Goal  No pain 12/20/17 2303   Activity Management  activity encouraged 12/27/17 0915    0-10 Pain Scale  6 12/26/17 2154   Activity Assistance Provided  assistance, 1 person 12/27/17 0915    Word Pain Scale  8 12/22/17 0756   Assistive Device Utilized  gait belt;walker 12/27/17 0915    Pain Location  Knee 12/26/17 2155   Additional Documentation  Activity Device Assistance (Row) 12/21/17 2054    Pain Orientation  Left 12/26/17 2155   POSITIONING      Pain Descriptors  Discomfort 12/25/17 2115   Body Position  independently positioning 12/27/17 0915    Pain Management Interventions  analgesia administered 12/23/17 2041   Head of Bed (HOB)  HOB at 20-30 degrees 12/27/17 0915    Pain Intervention(s)  Medication " (See eMAR) 12/26/17 2155   Positioning/Transfer Devices  pillows 12/27/17 0915    Response to Interventions  Absence of nonverbal indicators of pain 12/25/17 0324   Chair  Upright in chair 12/27/17 0915    PAIN ASSESSMENT/NONVERBAL ICU (ADULT)     ECG      Presence of Pain  No nonverbal indicators of pain present 12/22/17 0756   ECG Rhythm  Normal sinus rhythm 12/21/17 2331    Assessment Indicator  Post intervention;Patient not able 12/22/17 0005                 Patient Lines/Drains/Airways Status    Active LINES/DRAINS/AIRWAYS     Name: Placement date: Placement time: Site: Days: Last dressing change:    Colostomy 12/21/17 2214   RLQ   5     Peripheral IV 12/24/17 Right Hand 12/24/17   1447   Hand   2     Incision/Surgical Site 12/21/17 Abdomen 12/21/17 2132    5             Patient Lines/Drains/Airways Status    Active PICC/CVC     None            Intake/Output Detail Report     Date Intake       Output       Net    Shift P.O. I.V. NG/GT IV Piggyback Total Urine Emesis/NG output Stool Blood Total       Noc 12/25/17 2300 - 12/26/17 0659 150 -- -- -- 150 1300 -- 100 -- 1400 -1250    Day 12/26/17 0700 - 12/26/17 1459 -- -- -- -- -- 1300 -- 125 -- 1425 -1425    Vero 12/26/17 1500 - 12/26/17 2259 250 -- -- -- 250 1200 -- 340 -- 1540 -1290    Noc 12/26/17 2300 - 12/27/17 0659 240 -- -- -- 240 1475 -- -- -- 1475 -1235    Day 12/27/17 0700 - 12/27/17 1459 -- -- -- -- -- -- -- -- -- -- 0      Last Void/BM       Most Recent Value    Urine Occurrence 1 at 12/26/2017 2000    Stool Occurrence 1 at 12/26/2017 0553      Case Management/Discharge Planning     Case Management/Discharge Planning Flowsheet     REFERRAL INFORMATION     COPING/STRESS      Did the Initial Social Work Assessment result in a Social Work Case?  Yes 12/25/17 1238   Major Change/Loss/Stressor  residence change 12/20/17 1416    Admission Type  inpatient 12/25/17 1238   EXPECTED DISCHARGE      Arrived From  home or self-care 12/25/17 1238   Expected  Discharge Date  12/27/17 (or 28, tcu) 12/26/17 0755    Referral Source  physician 12/25/17 1238   FINAL RESOURCES      # of Referrals Placed by CTS  Post Acute Facilities 12/25/17 1238   Equipment Currently Used at Home  walker, rolling;grab bar;shower chair 12/27/17 1222    Post Acute Facilities  TCU 12/25/17 1238   ABUSE RISK SCREEN      Reason For Consult  discharge planning 12/25/17 1238   QUESTION TO PATIENT:  Has a member of your family or a partner(now or in the past) intimidated, hurt, manipulated, or controlled you in any way?  no 12/20/17 1416    Record Reviewed  clinical discipline documentation;history and physical;medical record;patient profile;plan of care 12/25/17 1238   QUESTION TO PATIENT: Do you feel safe going back to the place where you are living?  yes 12/20/17 1416    CTS Assigned to Laureano Saleh 12/25/17 1238   OBSERVATION: Is there reason to believe there has been maltreatment of a vulnerable adult (ie. Physical/Sexual/Emotional abuse, self neglect, lack of adequate food, shelter, medical care, or financial exploitation)?  no 12/20/17 1416    LIVING ENVIRONMENT     (R) MENTAL HEALTH SUICIDE RISK      Lives With  alone 12/27/17 1222   Are you depressed or being treated for depression?  Yes 12/20/17 1416    Living Arrangements  apartment 12/27/17 1222                       [place] [address] SURGICAL SPECIALITIES: [phone]            Medication Administration Report for Mary Grace Rivers as of 12/27/17 1442   Legend:    Given Hold Not Given Due Canceled Entry Other Actions    Time Time (Time) Time  Time-Action       Inactive    Active    Linked        Medications 12/21/17 12/22/17 12/23/17 12/24/17 12/25/17 12/26/17 12/27/17    acetaminophen (TYLENOL) Suppository 650 mg  Dose: 650 mg Freq: EVERY 4 HOURS PRN Route: RE  PRN Reason: mild pain  Start: 12/20/17 1619   Admin Instructions: Alternate ibuprofen (if ordered) with acetaminophen.  Maximum acetaminophen dose from all sources =  75 mg/kg/day not to exceed 4 grams/day.     1913-Auto Hold        0059-Unhold                acetaminophen (TYLENOL) tablet 1,000 mg  Dose: 1,000 mg Freq: EVERY 8 HOURS Route: PO  Start: 12/25/17 1400   Admin Instructions: IV to PO per pharmacy policy  Maximum acetaminophen dose from all sources = 75 mg/kg/day not to exceed 4 gram         1334 (1,000 mg)-Given       2202 (1,000 mg)-Given        0735 (1,000 mg)-Given       1417 (1,000 mg)-Given       2154 (1,000 mg)-Given        0641 (1,000 mg)-Given       [ ] 1400       [ ] 2200           acetaminophen (TYLENOL) tablet 650 mg  Dose: 650 mg Freq: EVERY 4 HOURS PRN Route: PO  PRN Reason: mild pain  Start: 12/20/17 1619   Admin Instructions: Alternate ibuprofen (if ordered) with acetaminophen.  Maximum acetaminophen dose from all sources = 75 mg/kg/day not to exceed 4 grams/day.     1913-Auto Hold        0059-Unhold                enoxaparin (LOVENOX) injection 40 mg  Dose: 40 mg Freq: EVERY 24 HOURS Route: SC  Start: 12/22/17 1700   Admin Instructions: Check to make sure start date/time is 12-24 hours post op unless documented complication, AND no sooner than 22 hours post op if spinal anesthesia used.   Continue until discharge to home. HOLD if platelet count falls below 50% of baseline or less than 100,000/ L and notify provider.      1736 (40 mg)-Given        1811 (40 mg)-Given        1716 (40 mg)-Given        1645 (40 mg)-Given        1748 (40 mg)-Given        [ ] 1700           hydrALAZINE (APRESOLINE) injection 10 mg  Dose: 10 mg Freq: EVERY 4 HOURS PRN Route: IV  PRN Comment: give for SBP > 160mmHg  Start: 12/20/17 1620   Admin Instructions: For ordered doses up to 40 mg, give IV Push undiluted over 1 minute.     1913-Auto Hold        0059-Unhold            0152 (10 mg)-Given           HYDROmorphone (PF) (DILAUDID) injection 0.3-0.5 mg  Dose: 0.3-0.5 mg Freq: EVERY 2 HOURS PRN Route: IV  PRN Reason: severe pain  Start: 12/20/17 4537   Admin Instructions: Hold  "while on PCA.  Start at the lowest dose.  May adjust dose by 0.1 mg every 2 hours as needed for pain control or improvement in physical function.  Hold dose for analgesic side effects.  Notify provider to assess for uncontrolled pain or analgesic side effects.  For ordered doses up to 4 mg give IV Push undiluted. Administer each 2mg over 2-5 minutes.     1913-Auto Hold        0059-Unhold       0318 (0.3 mg)-Given       0614 (0.5 mg)-Given       0848 (0.5 mg)-Given       1053 (0.5 mg)-Given       1512 (0.5 mg)-Given       2002 (0.5 mg)-Given        2025 (0.5 mg)-Given         0208 (0.3 mg)-Given             lactated ringers BOLUS 500 mL  Dose: 500 mL Freq: EVERY 4 HOURS PRN Route: IV  PRN Reason: other  PRN Comment: Give for urine output less than 80 mL/4 hours.  Start: 12/22/17 0102              lidocaine (LMX4) cream  Freq: EVERY 1 HOUR PRN Route: Top  PRN Reason: pain  PRN Comment: with VAD insertion or accessing implanted port.  Start: 12/22/17 0102   Admin Instructions: Do NOT give if patient has a history of allergy to any local anesthetic or any \"joe\" product.   Apply 30 minutes prior to VAD insertion or port access.  MAX Dose:  2.5 g (  of 5 g tube)               lidocaine 1 % 1 mL  Dose: 1 mL Freq: EVERY 1 HOUR PRN Route: OTHER  PRN Comment: mild pain with VAD insertion or accessing implanted port  Start: 12/22/17 0102   Admin Instructions: Do NOT give if patient has a history of allergy to any local anesthetic or any \"joe\" product. MAX dose 1 mL subcutaneous OR intradermal in divided doses.               melatonin tablet 1 mg  Dose: 1 mg Freq: AT BEDTIME PRN Route: PO  PRN Reason: sleep  Start: 12/20/17 1619   Admin Instructions: Do not give unless at least 6 hours of uninterrupted sleep is expected.     1913-Auto Hold        0059-Unhold          2207 (1 mg)-Given         0021 (1 mg)-Given           miconazole (MICATIN; MICRO GUARD) 2 % powder  Freq: EVERY 1 HOUR PRN Route: Top  PRN Reason: other  PRN " Comment: topical candidiasis  Start: 12/22/17 1055   Admin Instructions: Apply to affected area.                 naloxone (NARCAN) injection 0.1-0.4 mg  Dose: 0.1-0.4 mg Freq: EVERY 2 MIN PRN Route: IV  PRN Reason: opioid reversal  Start: 12/22/17 0102   Admin Instructions: For respiratory rate LESS than or EQUAL to 8.  Partial reversal dose:  0.1 mg titrated q 2 minutes for Analgesia Side Effects Monitoring Sedation Level of 3 (frequently drowsy, arousable, drifts to sleep during conversation).Full reversal dose:  0.4 mg bolus for Analgesia Side Effects Monitoring Sedation Level of 4 (somnolent, minimal or no response to stimulation).  For ordered doses up to 2mg give IVP. Give each 0.4mg over 15 seconds in emergency situations. For non-emergent situations further dilute in 9mL of NS to facilitate titration of response.               ondansetron (ZOFRAN-ODT) ODT tab 4 mg  Dose: 4 mg Freq: EVERY 6 HOURS PRN Route: PO  PRN Reasons: nausea,vomiting  Start: 12/20/17 1619   Admin Instructions: This is Step 1 of nausea and vomiting management.  If nausea not resolved in 15 minutes, go to Step 2 prochlorperazine (COMPAZINE). Do not push through foil backing. Peel back foil and gently remove. Place on tongue immediately. Administration with liquid unnecessary     1913-Auto Hold        0059-Unhold               Or  ondansetron (ZOFRAN) injection 4 mg  Dose: 4 mg Freq: EVERY 6 HOURS PRN Route: IV  PRN Reasons: nausea,vomiting  Start: 12/20/17 1619   Admin Instructions: This is Step 1 of nausea and vomiting management.  If nausea not resolved in 15 minutes, go to Step 2 prochlorperazine (COMPAZINE).  Irritant. For ordered doses up to 4 mg, give IV Push undiluted over 2-5 minutes.     1913-Auto Hold        0059-Unhold                pantoprazole (PROTONIX) EC tablet 40 mg  Dose: 40 mg Freq: EVERY MORNING BEFORE BREAKFAST Route: PO  Start: 12/26/17 0730   Admin Instructions: DO NOT CRUSH.  IV to PO per pharmacy policy           0729 (40 mg)-Given        0641 (40 mg)-Given           phenol (CHLORASEPTIC) 1.4 % spray 1 mL  Dose: 1 spray Freq: EVERY 1 HOUR PRN Route: MT  PRN Reason: sore throat  PRN Comment: without fever  Start: 12/22/17 0431     0628 (1 mL)-Given                potassium chloride (KLOR-CON) Packet 20-40 mEq  Dose: 20-40 mEq Freq: EVERY 2 HOURS PRN Route: ORAL OR FEED  PRN Reason: potassium supplementation  Start: 12/20/17 1619   Admin Instructions: Use if unable to tolerate tablets.  If Serum K+ 3.0-3.3, dose = 60 mEq po total dose (40 mEq x1 followed in 2 hours by 20 mEq x1). Recheck K+ level 4 hours after dose and the next AM.  If Serum K+ 2.5-2.9, dose = 80 mEq po total dose (40 mEq Q2H x2). Recheck K+ level 4 hours after dose and the next AM.  If Serum K+ less than 2.5, See IV order.  Dissolve packet contents in 4-8 ounces of cold water or juice.     1913-Auto Hold        0059-Unhold                potassium chloride 10 mEq in 100 mL intermittent infusion with 10 mg lidocaine  Dose: 10 mEq Freq: EVERY 1 HOUR PRN Route: IV  PRN Reason: potassium supplementation  Start: 12/20/17 1619   Admin Instructions: Infuse via PERIPHERAL LINE. Use potassium with lidocaine for pain with peripheral administration.  If Serum K+ 3.0-3.3, dose = 10 mEq/hr x4 doses (40 mEq IV total dose). Recheck K+ level 2 hours after dose and the next AM.  If Serum K+ less than 3.0, dose = 10 mEq/hr x6 doses (60 mEq IV total dose). Recheck K+ level 2 hours after dose and the next AM.     1913-Auto Hold        0059-Unhold                potassium chloride 20 mEq in 50 mL intermittent infusion  Dose: 20 mEq Freq: EVERY 1 HOUR PRN Route: IV  PRN Reason: potassium supplementation  Start: 12/20/17 1619   Admin Instructions: Infuse via CENTRAL LINE Only. May need EKG if less than 65 kg or on TPN - Max rate is 0.3 mEq/kg/hr for patients not on EKG monitoring.   If Serum K+ 3.0-3.3, dose = 20 mEq/hr x2 doses (40 mEq IV total dose). Recheck K+ level 2 hours  after dose and the next AM.  If Serum K+ less than 3.0, dose = 20 mEq/hr x3 doses (60 mEq IV total dose). Recheck K+ level 2 hours after dose and the next AM.     1913-Auto Hold        0059-Unhold                potassium chloride SA (K-DUR/KLOR-CON M) CR tablet 20-40 mEq  Dose: 20-40 mEq Freq: EVERY 2 HOURS PRN Route: PO  PRN Reason: potassium supplementation  Start: 12/20/17 1619   Admin Instructions: Use if able to take PO.   If Serum K+ 3.0-3.3, dose = 60 mEq po total dose (40 mEq x1 followed in 2 hours by 20 mEq x1). Recheck K+ level 4 hours after dose and the next AM.  If Serum K+ 2.5-2.9, dose = 80 mEq po total dose (40 mEq Q2H x2). Recheck K+ level 4 hours after dose and the next AM.  If Serum K+ less than 2.5, See IV order.  DO NOT CRUSH     1913-Auto Hold        0059-Unhold                prochlorperazine (COMPAZINE) injection 5 mg  Dose: 5 mg Freq: EVERY 6 HOURS PRN Route: IV  PRN Reasons: nausea,vomiting  Start: 12/20/17 1620   Admin Instructions: This is Step 2 of nausea and vomiting management. Give if nausea not resolved 15 minutes after giving ondansetron (ZOFRAN). If nausea not resolved in 15 minutes, go to Step 3 metoclopramide (REGLAN), if ordered.  For ordered doses up to 10 mg, give IV Push undiluted. Each 5mg over 1 minute.     1913-Auto Hold        0059-Unhold               Or  prochlorperazine (COMPAZINE) tablet 5 mg  Dose: 5 mg Freq: EVERY 6 HOURS PRN Route: PO  PRN Reason: vomiting  Start: 12/20/17 1620   Admin Instructions: This is Step 2 of nausea and vomiting management. Give if nausea not resolved 15 minutes after giving ondansetron (ZOFRAN). If nausea not resolved in 15 minutes, go to Step 3 metoclopramide (REGLAN), if ordered.     1913-Auto Hold        0059-Unhold               Or  prochlorperazine (COMPAZINE) Suppository 12.5 mg  Dose: 12.5 mg Freq: EVERY 12 HOURS PRN Route: RE  PRN Reasons: nausea,vomiting  Start: 12/20/17 1620   Admin Instructions: This is Step 2 of nausea and  vomiting management. Give if nausea not resolved 15 minutes after giving ondansetron (ZOFRAN). If nausea not resolved in 15 minutes, go to Step 3 metoclopramide (REGLAN), if ordered.     1913-Auto Hold        0059-Unhold                simethicone (MYLICON) chewable tablet 80 mg  Dose: 80 mg Freq: EVERY 6 HOURS PRN Route: PO  PRN Reason: cramping  Start: 12/25/17 0832        1212 (80 mg)-Given       2202 (80 mg)-Given             sodium chloride (OCEAN) 0.65 % nasal spray 1-2 spray  Dose: 1-2 spray Freq: EVERY 1 HOUR PRN Route: NA  PRN Reason: other  PRN Comment: dry nasal passages  Start: 12/20/17 2150   Admin Instructions: To affected nostril(s)     1913-Auto Hold        0059-Unhold                sodium chloride (PF) 0.9% PF flush 3 mL  Dose: 3 mL Freq: EVERY 8 HOURS Route: IK  Start: 12/22/17 0102   Admin Instructions: And Q1H PRN, to lock peripheral IV dormant line.      (0234)-Not Given       (0900)-Not Given       (1705)-Not Given        (0054)-Not Given       (1135)-Not Given       (1814)-Not Given        (0056)-Not Given       (0954)-Not Given       (1714)-Not Given        (0316)-Not Given       (1037)-Not Given       (1639)-Not Given               0919 (3 mL)-Given       1748 (3 mL)-Given        0022 (3 mL)-Given       0825 (3 mL)-Given       [ ] 1715           sodium chloride (PF) 0.9% PF flush 3 mL  Dose: 3 mL Freq: EVERY 1 HOUR PRN Route: IK  PRN Reason: line flush  PRN Comment: for peripheral IV flush post IV meds  Start: 12/22/17 0102             Discontinued Medications  Medications 12/21/17 12/22/17 12/23/17 12/24/17 12/25/17 12/26/17 12/27/17         Rate: 75 mL/hr Freq: CONTINUOUS Route: IV  Start: 12/22/17 0115   End: 12/27/17 0900   Admin Instructions: Change to saline lock when well tolerated.      0233 ( )-New Bag        1438 ( )-New Bag        0234 ( )-New Bag       1100-Stopped [C]       1945 ( )-New Bag        0841 ( )-New Bag         0900-Med Discontinued         Dose: 1,000 mg Freq: EVERY  6 HOURS Route: IV  Last Dose: 1,000 mg (12/25/17 0838)  Start: 12/21/17 2330   End: 12/25/17 0856   Admin Instructions: Continue until patient is no longer NPO and able to take clear liquids and other oral medications.  Maximum acetaminophen dose from all sources = 75 mg/kg/day not to exceed 4 grams/day.     2351 (1,000 mg)-New Bag        0614 (1,000 mg)-New Bag       1315 (1,000 mg)-New Bag       1735 (1,000 mg)-New Bag        0137 (1,000 mg)-New Bag       0703 (1,000 mg)-New Bag       1438 (1,000 mg)-New Bag       1940 (1,000 mg)-New Bag        0235 (1,000 mg)-New Bag       0951 (1,000 mg)-New Bag       1507 (1,000 mg)-New Bag [C]              2116 (1,000 mg)-New Bag        0320 (1,000 mg)-New Bag       0838 (1,000 mg)-New Bag       0856-Med Discontinued           Dose: 40 mg Freq: EVERY MORNING BEFORE BREAKFAST Route: IV  Start: 12/21/17 0730   End: 12/25/17 0857   Admin Instructions: Reconstitute vial with 10mLs Saline and administer IV Push  Irritant. For ordered doses up to 40 mg, give IV Push over 2 minutes when reconstituted with 10mL NS.     0620 (40 mg)-Given              1913-Auto Hold        0059-Unhold       0705 (40 mg)-Given        0703 (40 mg)-Given        0658 (40 mg)-Given        0647 (40 mg)-Given       0857-Med Discontinued           Dose: 3 mL Freq: EVERY 1 HOUR PRN Route: IK  PRN Reason: line flush  PRN Comment: for peripheral IV flush post IV meds  Start: 12/20/17 1619   End: 12/26/17 1051    1913-Auto Hold        0059-Unhold           1051-Med Discontinued                 INTERAGENCY TRANSFER FORM - NOTES (H&P, Discharge Summary, Consults, Procedures, Therapies)   12/20/2017                       [place] [address] [facility]GICAL SPECIALITIES: [phone]               History & Physicals      H&P by Ryan Carter MD at 12/20/2017  3:45 PM     Author:  Ryan Carter MD Service:  (none) Author Type:  Physician    Filed:  12/20/2017  6:24 PM Date of Service:   12/20/2017  3:45 PM Creation Time:  12/20/2017  3:45 PM    Status:  Signed :  Ryan Carter MD (Physician)         History and Physical     Mary Grace Rivers MRN# [id number]   YOB: 1925 Age: 89 year old      Date of Admission:  12/20/2017    Primary care provider: System, Provider Not In          Assessment and Plan:   89 year old female, with PmHx of depression, hypertension, UTI, colon polyp and history of   breast cancer, who presented today with symptoms of abdominal distension, nausea and   vomiting post meals and constipation for 4 days. Workup done on 12/20/17 revealed, CMP significant for Alb 2.8. CBC revealed, Hb 11.5, WBC 14.8 and Plts 335. INR was 1.02.[AT1.1] Lipase was normal.[AT1.2] EKG on 12/20/17 revealed NSR rate 81/min. CT abd/pelvis on 12/20/17 revealed, colonic obstruction at the splenic flexure. An underlying mass lesion cannot be excluded. Sigmoid/descending colonic diverticulosis, without CT evidence of diverticulitis. Hepatic several small hypodensities, which due to their ill-defined nature are indeterminate and could represent small hepatic metastasis. Left lower lobe pulmonary nonspecific 0.4 cm nodule on the uppermost image. Bilateral renal cysts, two of which appear complex. Chest x-rays on 12/20/17 was normal.     1. Colonic obstruction: ?due to mass at splenic flexure. For [place]-[facility] consult.  Keep NPO. For IV N/Saline. For IV antiemetics prn. For IV dilaudid prn.    2. Hypertension: controlled. Will hold Amlodipine-Olmesartan. For IV hydralazine prn.    3. GERD prophylaxis: for IV protonix 40mg qd.                   Chief Complaint:   Abdominal distension, nausea and vomiting x 4 days.    History is obtained from the patient's daughter         History of Present Illness:   89 year old female, with PmHx of depression, hypertension, UTI, colon polyp and history of   breast cancer, hearing loss, who presented today with symptoms of abdominal  distension, nausea and vomiting post meals and constipation for 4 days. The patient fell 2 weeks ago and was diagnosed with a UTI and was treated with an antibiotic ?name, which according to her daughter made the patient vomit all the time. She was then switched to Ciprofloxacin, which she completed on 12/16/17.  No history of weight loss. The patient had a colonoscopy done about 10 years ago and a colonic polyp was found, which was not removed.  No family history of colon cancer.  No complaints of chest pain, shortness of breath and no palpitation.  No urinary symptoms.  Workup done on 12/20/17 revealed, CMP significant for Alb 2.8. CBC revealed, Hb 11.5, WBC 14.8 and Plts 335. INR was 1.02. EKG on 12/20/17 revealed NSR rate 81/min. CT abd/pelvis on 12/20/17   revealed, colonic obstruction at the splenic flexure. An underlying mass lesion cannot be excluded.  Sigmoid/descending colonic diverticulosis, without CT evidence of diverticulitis. Hepatic several small hypodensities, which due to their ill-defined nature are indeterminate and could represent small hepatic metastasis. Left lower lobe pulmonary nonspecific 0.4 cm nodule on the  uppermost image. Bilateral renal cysts, two of which appear complex. Chest x-rays on  12/20/17 was normal. Attempt was made in the ER to pass an NG tube and this lead to  trauma to her nasal mucosa, with associated bleeding.             Past Medical History:[AT1.1]     Past Medical History:   Diagnosis Date     Cancer (H)     breast     Depressive disorder      Hypertension[AT1.3]              Past Surgical History:   Lumpectomy for breast cancer, Right knee replacement and cholecystectomy.              Social History:[AT1.1]     Social History   Substance Use Topics     Smoking status: Former Smoker     Smokeless tobacco: Never Used     Alcohol use No[AT1.4]            Family History:[AT1.1]   No family history on file.[AT1.5]          Immunizations:[AT1.1]     There is no  "immunization history on file for this patient.[AT1.5]         Allergies:[AT1.1]     Allergies   Allergen Reactions     Penicillins[AT1.5]              Medications:[AT1.1]     Prescriptions Prior to Admission   Medication Sig Dispense Refill Last Dose     Amlodipine-Olmesartan (MOSES) 10-40 MG TABS Take 1 tablet by mouth daily   12/19/2017 at am     polyethylene glycol (MIRALAX/GLYCOLAX) Packet Take 17 g by mouth daily   12/20/2017 at . Vomited shortler after dose.      bisacodyl (DULCOLAX) 10 MG Suppository Place 10 mg rectally daily as needed for constipation   Past Week at prn     Acetaminophen (TYLENOL PO) Take 325-650 mg by mouth every 4 hours as needed for mild pain or fever   Past Week at prn[AT1.5]             Review of Systems:   The 10 point Review of Systems is negative other than noted in the HPI              Physical Exam:     Vitals were reviewed[AT1.1]  Patient Vitals for the past 24 hrs:   BP Temp Temp src Pulse Heart Rate Resp SpO2 Height Weight   12/20/17 1546 121/56 97.8  F (36.6  C) Oral - 78 16 94 % - -   12/20/17 1345 117/51 97.8  F (36.6  C) Oral 79 79 16 92 % - -   12/20/17 1200 125/55 - - 84 - 16 93 % - -   12/20/17 0929 134/60 98.2  F (36.8  C) Oral 90 - 20 98 % 1.473 m (4' 10\") 58.5 kg (129 lb)[AT1.5]     Constitutional:   Elderly white female, cooperative, no apparent distress, O2 Sats 94% on 2L via NC     Lungs:   Clear to auscultation bilaterally, no crackles or wheezing     Cardiovascular:   S1 and S2, no S3 or S4, and no murmur noted     Abdomen:[AT1.1]   Firm[AT1.6], distended[AT1.1]+[AT1.2], non-tender, no masses palpated, no hepatosplenomegaly, BS present+     Musculoskeletal:   There is no redness, warmth, or swelling of the joints. No pedal edema                    Data:[AT1.1]     Results for orders placed or performed during the hospital encounter of 12/20/17   XR Chest 2 Views    Narrative    CHEST TWO VIEWS  12/20/2017 10:52 AM    HISTORY:  Chest and abdominal " pain.    COMPARISON:  12/6/2017      Impression    IMPRESSION:  No infiltrates or acute process. Heart and pulmonary  vasculature within normal limits. Aortic calcification. Surgical clips  project over the lower lateral left hemithorax.     JAZZMINE BELTRAN MD   CT Abdomen Pelvis w Contrast    Narrative    CT ABDOMEN/PELVIS WITH CONTRAST December 20, 2017 10:42 AM     HISTORY: Distended tender abdomen with vomiting.     CONTRAST DOSE: 78 mL Isovue-[id number]    TECHNIQUE: Radiation dose for this scan was reduced using automated  exposure control, adjustment of the mA and/or kV according to patient  size, or iterative reconstruction technique.    FINDINGS: Calcified granulomas are noted at the lung bases. However,  the upper most image, there is a nonspecific 0.4 cm left lower lobe  pulmonary nodule. Multiple hepatic hypodensities are noted. Several  these are ill-defined and therefore indeterminate. Intrahepatic  biliary prominence is noted which may be related to  postcholecystectomy state. Multiple bilateral renal cysts are noted.  Thin rim of calcification is noted within a right superior pole cyst.  Hyperdense cyst is noted in the left renal lower pole. A  nonobstructing stone is also noted within the left mid pole. There is  marked fluid dilatation of the colon extending from the cecum to the  splenic flexure suggesting colonic obstruction. Descending and sigmoid  colon are decompressed. No free peritoneal fluid or air is  demonstrated. Sigmoid diverticulosis is noted without adjacent  inflammatory stranding to clearly indicate diverticulitis.      Impression    IMPRESSION:  1. Colonic obstruction at the splenic flexure. An underlying mass  lesion cannot be excluded.  2. Sigmoid/descending colonic diverticulosis without CT evidence of  diverticulitis.  3. Hepatic several small hypodensities which due to their ill-defined  nature are indeterminate and could represent small hepatic metastasis.  4. Left lower lobe  pulmonary nonspecific 0.4 cm nodule on the  uppermost image.  5. Bilateral renal cysts, two of which appear complex.    ROSA COLLINS MD   CBC with platelets differential   Result Value Ref Range    WBC 14.8 (H) 4.0 - 11.0 10e9/L    RBC Count 5.00 3.8 - 5.2 10e12/L    Hemoglobin 11.5 (L) 11.7 - 15.7 g/dL    Hematocrit 36.8 35.0 - 47.0 %    MCV 74 (L) 78 - 100 fl    MCH 23.0 (L) 26.5 - 33.0 pg    MCHC 31.3 (L) 31.5 - 36.5 g/dL    RDW 17.9 (H) 10.0 - 15.0 %    Platelet Count 335 150 - 450 10e9/L    Diff Method Automated Method     % Neutrophils 85.7 %    % Lymphocytes 6.8 %    % Monocytes 6.8 %    % Eosinophils 0.1 %    % Basophils 0.1 %    % Immature Granulocytes 0.5 %    Nucleated RBCs 0 0 /100    Absolute Neutrophil 12.7 (H) 1.6 - 8.3 10e9/L    Absolute Lymphocytes 1.0 0.8 - 5.3 10e9/L    Absolute Monocytes 1.0 0.0 - 1.3 10e9/L    Absolute Eosinophils 0.0 0.0 - 0.7 10e9/L    Absolute Basophils 0.0 0.0 - 0.2 10e9/L    Abs Immature Granulocytes 0.1 0 - 0.4 10e9/L    Absolute Nucleated RBC 0.0    INR   Result Value Ref Range    INR 1.02 0.86 - 1.14   Comprehensive metabolic panel   Result Value Ref Range    Sodium 136 133 - 144 mmol/L    Potassium 4.2 3.4 - 5.3 mmol/L    Chloride 106 94 - 109 mmol/L    Carbon Dioxide 22 20 - 32 mmol/L    Anion Gap 8 3 - 14 mmol/L    Glucose 120 (H) 70 - 99 mg/dL    Urea Nitrogen 18 7 - 30 mg/dL    Creatinine 0.89 0.52 - 1.04 mg/dL    GFR Estimate 59 (L) >60 mL/min/1.7m2    GFR Estimate If Black 72 >60 mL/min/1.7m2    Calcium 9.4 8.5 - 10.1 mg/dL    Bilirubin Total 0.8 0.2 - 1.3 mg/dL    Albumin 2.8 (L) 3.4 - 5.0 g/dL    Protein Total 7.0 6.8 - 8.8 g/dL    Alkaline Phosphatase 81 40 - 150 U/L    ALT 12 0 - 50 U/L    AST 16 0 - 45 U/L   Lipase   Result Value Ref Range    Lipase 233 73 - 393 U/L   EKG 12-lead, tracing only   Result Value Ref Range    Interpretation ECG Click View Image link to view waveform and result[AT1.5]         Revision History        User Key Date/Time User  Provider Type Action    > AT1.2 12/20/2017  6:24 PM Ryan Carter MD Physician Sign     AT1.6 12/20/2017  4:16 PM Ryan Carter MD Physician      AT1.5 12/20/2017  4:07 PM Ryan Carter MD Physician      AT1.4 12/20/2017  4:06 PM Ryan Carter MD Physician      AT1.3 12/20/2017  4:05 PM Ryan Carter MD Physician      AT1.1 12/20/2017  3:45 PM Ryan Carter MD Physician                   Discharge Summaries     No notes of this type exist for this encounter.         Consult Notes      Consults by Suha Ortez RD, LD at 12/22/2017  9:20 AM     Author:  Suha Ortez RD, LD Service:  Nutrition Author Type:  Registered Dietitian    Filed:  12/22/2017  9:20 AM Date of Service:  12/22/2017  9:20 AM Creation Time:  12/22/2017  9:18 AM    Status:  Signed :  Suha Ortez RD, LD (Registered Dietitian)     Consult Orders:    1. Nutrition Services Adult IP Consult [[id number]] ordered by Olivia Alarcon MD at 12/21/17 2249                Received standard post-op orders for low residue diet education.  Chart reviewed, pt had an exp lap with colostomy yesterday.    [facility] consult has been ordered, they will do education on diet for colostomy.  We are following pt, please see nutrition assessment from 12/21.    Suha Ortez RD  Pager [phone] (M-F)            [phone] (W/E & Hol)[CM1.1]           Revision History        User Key Date/Time User Provider Type Action    > CM1.1 12/22/2017  9:20 AM Suha Ortez RD, LD Registered Dietitian Sign            Consults by Cherelle Martinez RD, LD at 12/21/2017  2:42 PM     Author:  Cherelle Martinez RD, LD Service:  Nutrition Author Type:  Registered Dietitian    Filed:  12/21/2017  2:42 PM Date of Service:  12/21/2017  2:42 PM Creation Time:  12/21/2017  2:30 PM    Status:  Signed :  Cherelle Martinez RD, JENNIFER (Registered Dietitian)         CLINICAL NUTRITION SERVICES  -   "ASSESSMENT NOTE      Future/Additional Recommendations:   Add Ensure Plus BID btw meals when diet advanced = or > FL   Malnutrition:  Severe malnutrition  In Context of:  Acute illness or injury        REASON FOR ASSESSMENT  Mary Grace Rivers is a 89 year old female seen by Registered Dietitian for Admission Nutrition Risk Screen - Reduced oral intake over the last month    NUTRITION HISTORY  - Information obtained from patient, daughter Mili, and EPIC records.  - Patient is on a regular diet at home.  - Typical food/fluid intake is fairly good.  For the past 4 days PTA pt had N/V, abdominal distension, and constipation.  - Diet history:  Pt reports that her daughter does the shopping for her.  Pt is able to cook some items but daughter brings food on occasion as well.  Pt receives 8 meals per month from facility where she resides plus she has the ability to have more meals as needed at extra charge.   - Supplements:  None lately, but pt has had Ensure in the past and tolerates all flavors well.   - No food allergies/intolerances.  Pt has had decreased energy and conditioning with recent poor appetite.      CURRENT NUTRITION ORDERS  Diet Order:     NPO     Current Intake/Tolerance:  NPO x 2 days.  Ostomy surgery planned (ileostomy per daughter).    PHYSICAL FINDINGS  Observed  No nutrition-related physical findings observed  Obtained from Chart/Interdisciplinary Team  None noted    ANTHROPOMETRICS  Height:[MH1.1] 4' 10\"[MH1.2]  Admit Wt:  58.5 kg[MH1.1]  Body mass index is 2[MH1.2]7.02[MH1.1] kg/(m^2).[MH1.2]  Weight Status:  Overweight BMI 25-29.9  IBW:  43.2 kg  % IBW:  135%  Weight History:  Daughter reports pt has lost 5 lbs (4%) over the past week (131 lbs --> 126 lbs).  She states she thought her mom's admit wt was only 126 lbs, the same as what it was at her recent doctor's office visit.[MH1.1]    Wt Readings from Last 20 Encounters:   12/21/17 61.1 kg (134 lb 9.6 oz)   12/06/17 59 kg (130 lb)   06/16/15 60.3 kg " (133 lb)[MH1.3]       LABS  Labs reviewed    MEDICATIONS  Medications reviewed  NaCl at 100 mL/hr - for fluid delivery    Dosing Weight:  47 kg (adjusted for overwt)    ASSESSED NUTRITION NEEDS PER APPROVED PRACTICE GUIDELINES:  Estimated Energy Needs:  7394-6936 kcals (25-30 Kcal/Kg)  Justification: overweight  Estimated Protein Needs:  56-70 grams protein (1.2-1.5 g pro/Kg)  Justification: post-op  Estimated Fluid Needs:  1684-8670 mL (1 mL/Kcal)  Justification: maintenance    MALNUTRITION:  % Weight Loss:  > 2% in 1 week (severe malnutrition)  % Intake:  </= 50% for >/= 5 days (severe malnutrition)  Subcutaneous Fat Loss:  None observed  Muscle Loss:  None observed  Fluid Retention:  None noted    Malnutrition Diagnosis: Severe malnutrition  In Context of:  Acute illness or injury    NUTRITION DIAGNOSIS:  Inadequate protein-energy intake related to altered GI function as evidenced by limited nutritional intake over past week with resultant 4% weight loss.      NUTRITION INTERVENTIONS  Recommendations / Nutrition Prescription  ADAT after surgery per MD discretion  Add Ensure Plus btw meals when diet advanced = or > FL    Implementation  Nutrition education: Not appropriate at this time due to patient condition    Nutrition Goals  Diet will be advanced in the next 3-5 days and pt will consume > 50% meals.      MONITORING AND EVALUATION:  Progress towards goals will be monitored and evaluated per protocol and Practice Guidelines[MH1.1]    Cherelle Martinez[MH1.2], JENNIFER ASH, KATIA[MH1.1]             Revision History        User Key Date/Time User Provider Type Action    > MH1.3 12/21/2017  2:42 PM Cherelle Martinez RD, JENNIFER Registered Dietitian Sign     MH1.2 12/21/2017  2:31 PM Cherelle Martinez RD, JENNIFER Registered Dietitian      MH1.1 12/21/2017  2:30 PM Cherelle Martinez, NILSA, JENNIFER Registered Dietitian             Consults by Justa Awad PA-C at 12/20/2017  4:39 PM     Author:  Justa Awad PA-C  Service:  Colorectal Surgery Author Type:  Physician Assistant - C    Filed:  12/20/2017  5:28 PM Date of Service:  12/20/2017  4:39 PM Creation Time:  12/20/2017  4:39 PM    Status:  Attested :  Justa Awad PA-C (Physician Assistant - C)    Cosigner:  Leah Perez MD at 12/20/2017 11:21 PM         Consult Orders:    1. Colorectal Surgery IP Consult: Patient to be seen: Routine - within 24 hours; History of nausea/vomiting/abdominal distension/possible colon mass.; Consultant may enter orders: Yes [[id number]] ordered by Ryan Carter MD at 12/20/17 1616           Attestation signed by Leah Perez MD at 12/20/2017 11:21 PM        Physician Attestation   ILeah, saw and evaluated Mary Grace Rivers as part of a shared visit.  I have reviewed and discussed with the advanced practice provider their history, physical and plan.    I personally reviewed the vital signs, medications, labs and imaging.    My key history or physical exam findings: 89 year old woman presents with abdominal pain, nausea and emesis. She had recently been diagnosed with UTI and started on antibiotics; the antibiotic was changed because of nausea and abdominal discomfort. However the symptoms persisted. She and her daughter report that she has been passing flatus including this am. She has been passing stool as well although the last normal bowel movement was four or more days ago. Patient and daughter have noted decreased energy and appetite recently.  Her daughter reports that the patient underwent a colonoscopy about 10 years ago during which a polyp was found that could not be removed. Only abdominal surgery is a cholecystectomy.   A CT scan revealed an obstructing lesion at the splenic flexure with proximal colonic and small bowel distension with fluid and distal decompressed bowel. There also appears to be a mass in the ascending colon that abuts a loop of small intestine. There  "are indeterminate lesions in the liver.   Patient has a history of breast cancer, depression and hypertension.     Alert and appears relatively comfortable. Hard of hearing but answers questions appropriately.   /58 (BP Location: Right arm)  Pulse 79  Temp 98.4  F (36.9  C) (Oral)  Resp 18  Ht 1.473 m (4' 10\")  Wt 58.5 kg (129 lb)  SpO2 93%  BMI 26.96 kg/m2  Abdomen: distended but soft. Tender fullness on the right side but no peritoneal irritation.     Key management decisions made by me: Patient with colonic obstruction at splenic flexure likely secondary to a malignancy. There also appears to be a mass in the ascending colon although that was not documented in the CT reading. Will ask for a radiology re-evaluation. There are liver lesions which are possible metastases.   I do not think the patient needs emergent surgery tonight. However if the patient and her family agree, then surgical intervention likely necessary to relieve obstruction.   Will ask radiology to review the CT scan.   Discussed the possibility of surgery including a stoma with the patient and her daughter. Options would include just proximal diversion-would need assessment for appropriate point of diversion depending upon findings in right colon or resection with diversion. On a preliminary basis, the daughter expressed interest in proceeding with surgery if it might result in improved quality of life.  Further discussion with patient and daughter will be necessary once CT scan reviewed.   I will be away the rest of the week. My partners will cover.     Thank you        Leah Perez  Date of Service (when I saw the patient): 12/20/17                               [facility]  Colon and Rectal Surgery Consult Note  Name: Mary Grace Rivers    MRN: [id number]  YOB: 1925    Age: 89 year old  Date of admission: 12/20/2017  Primary care provider: System, Provider Not In     Requesting Physician:  Dr. Carter  Reason " for consult:  Abdominal distention and possible mass           History of Present Illness:   Mary Grace Rivers is a 89 year old female, seen at the request of Dr. Carter, presents with abdominal pain. The patient was recently in the ER following an episode of unconsciousness after drinking alcohol. She was found to have an UTI and started on antibiotics. She was switched to Cipro and finished the course. She has been having continued symptoms of abdominal pain, distention, nausea, vomiting, loss of appetite and constipation. She[KB1.1] states that she's been passing gas and last passed gas today. She has been having bowel movements but only small amounts throughout the day. She denies mucous or blood with her stools. Per the daughter [place] and the daughter has noticed a significant decrease in the patient's energy. The patient has had a cholecystectomy in the past. No other abdominal surgeries. She has no family history of colon cancer in her family. Her daughter and some of her brothers have had colon polyps but the daughter is unsure of the kind.[KB1.2]           Colonoscopy History:[KB1.1]  Per the patient's daughter the patient had a colonoscopy 10 years ago and there was an unresectable colon polyp that was left.[KB1.3]                Past Medical History:     Past Medical History:   Diagnosis Date     Cancer (H)     breast     Depressive disorder      Hypertension              Past Surgical History:     Past Surgical History:   Procedure Laterality Date     BREAST SURGERY       CHOLECYSTECTOMY       GYN SURGERY       ORTHOPEDIC SURGERY                 Social History:     Social History   Substance Use Topics     Smoking status: Former Smoker     Smokeless tobacco: Never Used     Alcohol use No             Family History:   No family history on file.          Allergies:     Allergies   Allergen Reactions     Penicillins              Medications:       sodium chloride (PF)  3 mL  "Intracatheter Q8H             Review of Systems:   A comprehensive greater than 10 system review of systems was carried out.  Pertinent positives and negatives are noted above.  Otherwise negative for contributory info.            Physical Exam:     Blood pressure 121/56, pulse 79, temperature 97.8  F (36.6  C), temperature source Oral, resp. rate 16, height 1.473 m (4' 10\"), weight 58.5 kg (129 lb), SpO2 94 %, not currently breastfeeding.  No intake or output data in the 24 hours ending 12/20/17 1639  Exam:  General - Awake alert and oriented, appears stated age  Pulm - Non-labored breathing with normal respiratory effort  CVS - reg rate and rhythm, no peripheral edema  Abd -[KB1.1] soft, distended, firmness palpated in the RLQ. Tenderness to palpation in the LLQ. No rebound or guarding.[KB1.2] , Rectal exam was[KB1.1] not[KB1.2] performed.   Neuro - CN II-XII grossly intact  Musculoskeletal - extremeties with no clubbing, cyanosis or edema; able to ambulate  Psych - responsive, alert, cooperative; oriented x3; appropriate mood and affect  External/skin - inspection reveals no rashes, lesions or ulcers, normal coloring           Data Reviewed:[KB1.1]     Results for orders placed or performed during the hospital encounter of 12/20/17   XR Chest 2 Views    Narrative    CHEST TWO VIEWS  12/20/2017 10:52 AM    HISTORY:  Chest and abdominal pain.    COMPARISON:  12/6/2017      Impression    IMPRESSION:  No infiltrates or acute process. Heart and pulmonary  vasculature within normal limits. Aortic calcification. Surgical clips  project over the lower lateral left hemithorax.     JAZZMINE BELTRAN MD   CT Abdomen Pelvis w Contrast    Narrative    CT ABDOMEN/PELVIS WITH CONTRAST December 20, 2017 10:42 AM     HISTORY: Distended tender abdomen with vomiting.     CONTRAST DOSE: 78 mL Isovue-370    TECHNIQUE: Radiation dose for this scan was reduced using automated  exposure control, adjustment of the mA and/or kV according to " patient  size, or iterative reconstruction technique.    FINDINGS: Calcified granulomas are noted at the lung bases. However,  the upper most image, there is a nonspecific 0.4 cm left lower lobe  pulmonary nodule. Multiple hepatic hypodensities are noted. Several  these are ill-defined and therefore indeterminate. Intrahepatic  biliary prominence is noted which may be related to  postcholecystectomy state. Multiple bilateral renal cysts are noted.  Thin rim of calcification is noted within a right superior pole cyst.  Hyperdense cyst is noted in the left renal lower pole. A  nonobstructing stone is also noted within the left mid pole. There is  marked fluid dilatation of the colon extending from the cecum to the  splenic flexure suggesting colonic obstruction. Descending and sigmoid  colon are decompressed. No free peritoneal fluid or air is  demonstrated. Sigmoid diverticulosis is noted without adjacent  inflammatory stranding to clearly indicate diverticulitis.      Impression    IMPRESSION:  1. Colonic obstruction at the splenic flexure. An underlying mass  lesion cannot be excluded.  2. Sigmoid/descending colonic diverticulosis without CT evidence of  diverticulitis.  3. Hepatic several small hypodensities which due to their ill-defined  nature are indeterminate and could represent small hepatic metastasis.  4. Left lower lobe pulmonary nonspecific 0.4 cm nodule on the  uppermost image.  5. Bilateral renal cysts, two of which appear complex.    ROSA COLLINS MD[KB1.4]     5:21pm. Dr. Velázquez  Was called and we reviewed the CT scan. The patient has an intra-luminal cecal mass about 4.0 cm as well as the splenic mass.[KB1.2]       Recent Labs  Lab 12/20/17  0940   WBC 14.8*   HGB 11.5*   HCT 36.8   MCV 74*          Recent Labs  Lab 12/20/17  0940      POTASSIUM 4.2   CHLORIDE 106   CO2 22   ANIONGAP 8   *   BUN 18   CR 0.89   GFRESTIMATED 59*   GFRESTBLACK 72   LUPE 9.4   PROTTOTAL 7.0   ALBUMIN  2.8*   BILITOTAL 0.8   ALKPHOS 81   AST 16   ALT 12       Recent Labs  Lab 12/20/17  0940   INR 1.02     No results for input(s): LACT in the last 168 hours.    Recent Labs  Lab 12/20/17  1510   COLOR Yellow   APPEARANCE Clear   URINEGLC Negative   URINEBILI Negative   URINEKETONE Negative   SG 1.005   UBLD Negative   URINEPH 6.0   PROTEIN 30*   NITRITE Negative   LEUKEST Trace*   RBCU 2   WBCU 3*[KB1.4]         Assessment and Plan:   Mary Grace is a 89 year old woman who comes in for issues with abdominal pain, bloating, nausea and vomiting. The patient recently completed a course of antibiotics for a UTI after being seen in the ER 2 weeks ago.[KB1.1] She has had intermittent nausea, vomiting, constipation, bloating and abdominal pain. The patient and her daughter attributed this to the multiple antibiotics she has been on. However, she completed the antibiotics 4 days ago and has had worsening nausea, vomiting and abdominal pain so presented to the ER. CT scan done show[KB1.2] an obstruction at the splenic flexure[KB1.1] as well as a cecal mass.[KB1.2]   Plan:  1. Admit to hospitalist  2. Surgery:[KB1.1] It was discussed that the patient will need decompression with a diverting ostomy vs hospice[KB1.2]  3. Diet:[KB1.1] NPO. Place NG tube[KB1.2]  4. IV Fluids:[KB1.1] per hospitalist[KB1.2]  5. Antibiotics:  None indicated  6. Medications:    7. I&O s:  strict I&O s  8. Labs:   - Reviewed: myself  - Ordered: CEA   9. Imaging:   - Dr. Perez and myself have personally viewed: CT abd/pelvis[KB1.1]. CT results also discussed with Dr. Velázquez and additionally found to have an intra-luminal cecal mass.[KB1.2]   - Ordered:  none  10. Activity:  OOB, ambulate  11. DVT prophylaxis: SCD s,   12. This plan has been discussed with Dr. Perez    Patient specific identified risk factors considered as part of today s evaluation include: age    Additional history obtained from[KB1.1] chart review and daughter[KB1.2].  Time spent on  "consultation:[KB1.1] 40[KB1.2] greater than 50% of total encounter time was spent in counseling and or coordination of care.            Justa Awad PA-C  [facility]  Phone: [phone]  Fax: [phone][KB1.1]       Revision History        User Key Date/Time User Provider Type Action    > KB1.2 12/20/2017  5:28 PM Justa Awad PA-C Physician Assistant - KYE Sign     KB1.3 12/20/2017  5:12 PM Justa Awad PA-C Physician Assistant - KYE      KB1.4 12/20/2017  4:42 PM Justa Awad PA-C Physician Assistant Minna FISHMAN      KB1.1 12/20/2017  4:39 PM Justa Awad PA-C Physician Assistant Minna FISHMAN                      Progress Notes - Physician (Notes for yesterday and today)      Progress Notes by Justa Awad PA-C at 12/27/2017  8:35 AM     Author:  Justa Awad PA-C Service:  Colorectal Surgery Author Type:  Physician Assistant Minna FISHMAN    Filed:  12/27/2017  8:38 AM Date of Service:  12/27/2017  8:35 AM Creation Time:  12/27/2017  8:35 AM    Status:  Signed :  Justa wAad PA-C (Physician Assistant - C)         COLON & RECTAL SURGERY  PROGRESS NOTE    December 27, 2017  Post-op Day # 5    SUBJECTIVE:  The patient states that her left knee hurts for the past 2 days after laying in bed. She states, \"It feels stiff.\" The patient is tired this morning because she hasn't slept well in the past 2 days. She is tolerating food without issues and having good gas and stool from colostomy.     OBJECTIVE:  Temp:  [97.9  F (36.6  C)-99  F (37.2  C)] 98  F (36.7  C)  Pulse:  [88-95] 91  Heart Rate:  [93-97] 93  Resp:  [16-18] 18  BP: (116-167)/(50-71) 150/61  SpO2:  [92 %-95 %] 92 %    Intake/Output Summary (Last 24 hours) at 12/27/17 0835  Last data filed at 12/27/17 0606   Gross per 24 hour   Intake              490 ml   Output             4090 ml   Net            -3600 ml       GENERAL:  Awake, alert, no acute distress,   HEAD - Nomocephalic " atraumatic  SCLERA anicteric  EXTREMITIES warm and well perfused  ABDOMEN:  Soft, appropriately tender, non-distended, colostomy in opaque bag. Small amount of gas and liquid stool.   INCISION:  C/d/i,     LABS:  Lab Results   Component Value Date    WBC 9.3 12/21/2017     Lab Results   Component Value Date    HGB 8.8 12/22/2017     Lab Results   Component Value Date    HCT 31.0 12/21/2017     Lab Results   Component Value Date     12/27/2017     Last Basic Metabolic Panel:  Lab Results   Component Value Date     12/21/2017      Lab Results   Component Value Date    POTASSIUM 3.7 12/21/2017     Lab Results   Component Value Date    CHLORIDE 114 12/21/2017     Lab Results   Component Value Date    LUPE 8.0 12/21/2017     Lab Results   Component Value Date    CO2 21 12/21/2017     Lab Results   Component Value Date    BUN 15 12/21/2017     Lab Results   Component Value Date    CR 0.51 12/27/2017     Lab Results   Component Value Date     12/22/2017       ASSESSMENT/PLAN:POD#5 lap transverse loop colostomy for obstruction due to lesion at the splenic flexure with lesion in the right colon and scattered peritoneal implants.   1. Low residue diet  2. PO PRN pain meds  3. Lovenox for prophylaxis. Will need 30 days post op  4. Dispo: Okay to d/c per colorectal surgery team. Awaiting insurance and TCU placement    Justa Awad PA-C  [facility]  Phone: [phone][KB1.1]               Revision History        User Key Date/Time User Provider Type Action    > KB1.1 12/27/2017  8:38 AM Justa Awad PA-C Physician Assistant - C Sign            Progress Notes by Belen Richards MD at 12/26/2017  3:11 PM     Author:  Belen Richards MD Service:  Hospitalist Author Type:  Physician    Filed:  12/26/2017  3:34 PM Date of Service:  12/26/2017  3:11 PM Creation Time:  12/26/2017  3:11 PM    Status:  Signed :  Belen Richards MD (Physician)          [facility]    Hospitalist Progress Note  Belen Richards MD    Assessment & Plan     89 year old female, with PmHx of depression, hypertension, UTI, colon polyp and history of breast cancer, who presented on 12/20/2017 with abdominal distension, nausea and vomiting post meals and constipation for 4 days. Workup done on 12/20/17 revealed, CMP significant for Alb 2.8. CBC revealed, Hb 11.5, WBC 14.8 and Plts 335. INR was 1.02. Lipase was normal. EKG on 12/20/17 revealed NSR rate 81/min. CT abd/pelvis on 12/20/17 revealed, colonic obstruction at the splenic flexure. An underlying mass lesion cannot be excluded. Sigmoid/descending colonic diverticulosis, without CT evidence of diverticulitis. Hepatic several small hypodensities, which due to their ill-defined nature are indeterminate and could represent small hepatic metastasis. Left lower lobe pulmonary nonspecific 0.4 cm nodule on the uppermost image. Bilateral renal cysts, two of which appear complex. Chest x-rays on 12/20/17 was normal. EKG done on 12/21/17 revealed, NSR rate 93/min, with occasional PVCs. ECHO done on 12/21/17 revealed, moderate to severe concentric LV hypertrophy, with hyperdynamic systolic function. Visually, LVEF is estimated at 70-75%. No evidence of dynamic mitral regurgitation. Normal RV size with hyperdynamic systolic function. Trileaflet aortic valve sclerosis, without hemodynamically significant stenosis. Mild tricuspid regurgitation.      1. POD# 5 Exploratory laparoscopy, with loop transverse colostomy with biopsy:  - post op care as per CR service, including pain control and DVT/px  - NGT out  - started on clear liquids, then advanced to full liquids diet-->low fiber diet now; ostomy bag with liquid stool  - was having intermittent abdominal pain related to passing gas --> started on Simethicone prn on 12/25 and today seems better  - encourage ambulation  - Appreciate ostomy nurse input  - was on Cipro/Flagyl-  now off ABX  - c/o antiemetics prn, try to minimize dilaudid prn since it seems that hypoxia might be related to narcotics  - pathology report still pending  - PT eval  - will need TCU at the time of the d/c; SW aware    2. Acute hypoxic respiratory failure- resolved  - Echo with normal EF as above  - was on HFNC- now improving- down to 2L yesterday and now sat well 91% on RA  - Chest x-rays on 12/22/17 did not show any airspace consolidation, pneumothorax or pleural effusion. Heart size is normal.  - likely related to Dilaudid use- so will try to minimize  - off iv fluids  - IS prn.      3. Hypertension: controlled.   - PTA Amlodipine-Olmesartan are on hold.  - IV hydralazine prn.     4. GERD prophylaxis: on protonix 40mg qd.     5. Acute blood loss anemia: due to recent abdominal surgery and hemodilution.   Hb 11.5-->9.3-->8.8g/dl on 12/22/17    6. Severe malnutrition: this is due to underlying illness. Appreciate Nutritionist input.    DVT Prophylaxis: Lovenox  Code Status: Full Code    Disposition: medically ready for d/c to TCU pending insurance authorization    Interval History   Doing better today, was sleeping comfortable, woke up, reported some abdominal pain but seems improved, tolerating diet, no chest pain, no SOB; discussed with bedside RN and CC     -Data reviewed today: I reviewed all new labs and imaging results over the last 24 hours. I personally reviewed no images or EKG's today.    Physical Exam   Temp: 98.4  F (36.9  C) Temp src: Oral BP: 139/64 Pulse: 88 Heart Rate: 94 Resp: 16 SpO2: 95 % O2 Device: None (Room air)    Vitals:    12/24/17 0600 12/25/17 0414 12/26/17 0554   Weight: 58.3 kg (128 lb 8.5 oz) 62.3 kg (137 lb 5.6 oz) 63.5 kg (139 lb 15.9 oz)     Vital Signs with Ranges  Temp:  [97.3  F (36.3  C)-98.4  F (36.9  C)] 98.4  F (36.9  C)  Pulse:  [87-88] 88  Heart Rate:  [91-94] 94  Resp:  [16-20] 16  BP: (116-164)/(50-73) 139/64  SpO2:  [91 %-95 %] 95 %  I/O last 3 completed shifts:  In:  540 [P.O.:540]  Out: 3825 [Urine:3250; Stool:575]    Constitutional: Elderly white female, awake, alert, NAD, forgetful  Respiratory: BS vesicular bilaterally, no crackles or wheezing  Cardiovascular: S1 and S2, reg, with soft ESM murmur noted over the precordium  GI: Soft, non-distended, tenderness++ on palpation on right side, right sided colostomy in situ+, BS present+  Skin/Integumen: No rashes  Extremities: No pedal edema    Medications     NaCl 75 mL/hr at 12/25/17 0841       acetaminophen  1,000 mg Oral Q8H     pantoprazole  40 mg Oral QAM AC     sodium chloride (PF)  3 mL Intracatheter Q8H     enoxaparin  40 mg Subcutaneous Q24H       Data     Recent Labs  Lab 12/24/17  0655 12/22/17  0729 12/21/17  0611 12/20/17  0940   WBC  --   --  9.3 14.8*   HGB  --  8.8* 9.3* 11.5*   MCV  --   --  76* 74*    239 257 335   INR  --   --   --  1.02   NA  --   --  143 136   POTASSIUM  --   --  3.7 4.2   CHLORIDE  --   --  114* 106   CO2  --   --  21 22   BUN  --   --  15 18   CR  --  0.68 0.68 0.89   ANIONGAP  --   --  8 8   LUPE  --   --  8.0* 9.4   GLC  --  140* 75 120*   ALBUMIN  --   --   --  2.8*   PROTTOTAL  --   --   --  7.0   BILITOTAL  --   --   --  0.8   ALKPHOS  --   --   --  81   ALT  --   --   --  12   AST  --   --   --  16   LIPASE  --   --   --  233       No results found for this or any previous visit (from the past 24 hour(s)).[DN1.1]       Revision History        User Key Date/Time User Provider Type Action    > DN1.1 12/26/2017  3:34 PM Belen Richards MD Physician Sign            Progress Notes by Keenan Rivero MD at 12/26/2017  8:10 AM     Author:  Keenan Rivero MD Service:  Colorectal Surgery Author Type:  Physician    Filed:  12/26/2017 12:05 PM Date of Service:  12/26/2017  8:10 AM Creation Time:  12/26/2017  8:10 AM    Status:  Addendum :  Keenan Rivero MD (Physician)         COLON & RECTAL SURGERY PROGRESS NOTE      SUBJECTIVE:    Feeling well         OBJECTIVE:  Temp:   [97.3  F (36.3  C)-98.4  F (36.9  C)] 97.8  F (36.6  C)  Pulse:  [87] 87  Heart Rate:  [91-94] 94  Resp:  [16-20] 16  BP: (129-164)/(55-73) 164/55  SpO2:  [91 %-95 %] 93 %      Intake/Output Summary (Last 24 hours) at 12/25/17 0833  Last data filed at 12/25/17 0438   Gross per 24 hour   Intake              600 ml   Output             1925 ml   Net            -1325 ml       Physical Exam:  GEN: NAD, intermittently confused but easily re-orientable  Abd: soft, ND, minimally ttp, stoma bag with gas and stool      LABS:   Lab Results   Component Value Date    WBC 9.3 12/21/2017    HGB 8.8 (L) 12/22/2017    HCT 31.0 (L) 12/21/2017     12/24/2017     12/21/2017    POTASSIUM 3.7 12/21/2017    CHLORIDE 114 (H) 12/21/2017    CO2 21 12/21/2017    BUN 15 12/21/2017    CR 0.68 12/22/2017     (H) 12/22/2017    TROPI 0.026 12/06/2017    AST 16 12/20/2017    ALT 12 12/20/2017    ALKPHOS 81 12/20/2017    BILITOTAL 0.8 12/20/2017    INR 1.02 12/20/2017       ASSESSMENT/PLAN: 89F POD# 5 status post lap transverse loop colostomy.       Low fiber diet  All oral meds  dispo per primary      Ted Wang MD  CRS fellow  [phone][JD1.1]    CRS Staff  Seen and examined.  Agree with above.  Awaiting biopsies.  OK to d/c once TCU spot secured.    Keenan Rivero MD  Colorectal Surgery  [phone] (office)  [phone] (pager)  [email or web address]     [JD1.1]     Revision History        User Key Date/Time User Provider Type Action    > MO1.1 12/26/2017 12:05 PM Keenan Rivero MD Physician Addend     JD1.1 12/26/2017  8:11 AM Gary Wang MD Fellow Sign                  Procedure Notes     No notes of this type exist for this encounter.         Progress Notes - Therapies (Notes from 12/24/17 through 12/27/17)      Progress Notes by Carlos Mack OT at 12/27/2017 12:22 PM     Author:  Carlos Mack OT Service:  Acute IP Rehab Author Type:  Occupational Therapist    Filed:  12/27/2017 12:22 PM Date of  "Service:  12/27/2017 12:22 PM Creation Time:  12/27/2017 12:22 PM    Status:  Signed :  Carlos Mack OT (Occupational Therapist)          12/27/17 1210   Quick Adds   Type of Visit Initial Occupational Therapy Evaluation   Living Environment   Lives With alone   Living Arrangements apartment   Home Accessibility no concerns   Self-Care   Dominant Hand right   Usual Activity Tolerance good   Current Activity Tolerance poor   Regular Exercise yes   Activity/Exercise Type strength training   Exercise Amount/Frequency 5-7 times/wk   Equipment Currently Used at Home walker, rolling;grab bar;shower chair   Functional Level Prior   Ambulation 1-->assistive equipment   Transferring 1-->assistive equipment   Toileting 1-->assistive equipment   Bathing 1-->assistive equipment   Dressing 0-->independent   Eating 0-->independent   Communication 0-->understands/communicates without difficulty   Swallowing 0-->swallows foods/liquids without difficulty   Cognition 0 - no cognition issues reported   Fall history within last six months yes   Number of times patient has fallen within last six months 2   Prior Functional Level Comment Son present who confirmed info. Pt takes 1meal per day most days in community dining area and makes simple meals on own. She is indep med mgmt, bathing, laundry. She just moved to apartment so has not yet had to clean, son said facility will provide assist and pt stated \"no , I will do on my own.\" Family does finances, transport and assists as needed.   General Information   Onset of Illness/Injury or Date of Surgery - Date 12/21/17   Referring Physician Dr. Alarcon   Patient/Family Goals Statement TCU prior to return home   Additional Occupational Profile Info/Pertinent History of Current Problem 89yo female post op lap colostomy d/t colon mass   Precautions/Limitations fall precautions;abdominal precautions   Cognitive Status Examination   Orientation orientation to person, place and time "   Level of Consciousness alert   Able to Follow Commands success, 1-step commands;success, 2-step commands   Personal Safety (Cognitive) WNL/WFL   Cognitive Comment not fully assessed   Visual Perception   Visual Perception No deficits were identified;Wears glasses   Pain Assessment   Patient Currently in Pain Yes, see Vital Sign flowsheet   Range of Motion (ROM)   ROM Quick Adds (BUE WFL)   Strength   Strength Comments B yifan 4/5   Mobility   Bed Mobility Comments Sit to supine Mod A of 1   Transfer Skill: Bed to Chair/Chair to Bed   Level of [place]: Bed to Chair minimum assist (75% patients effort)   Physical Assist/Nonphysical Assist: Bed to Chair 1 person assist   Weight-Bearing Restrictions full weight-bearing   Assistive Device - Transfer Skill Bed to Chair Chair to Bed Rehab Eval rolling walker   Transfer Skill: Sit to Stand   Level of [place]: Sit/Stand contact guard  (from chair)   Physical Assist/Nonphysical Assist: Sit/Stand verbal cues   Assistive Device for Transfer: Sit/Stand rolling walker   Toilet Transfer   Toilet Transfer Comments low stool (pt reports hers higher in apt), required Mod A for sit to stand   Balance   Balance Comments CGA amb in room with walker/short distance   Upper Body Dressing   Level of [place]: Dress Upper Body moderate assist (50% patients effort)   Physical Assist/Nonphysical Assist: Dress Upper Body 1 person assist   Lower Body Dressing   Level of [place]: Dress Lower Body moderate assist (50% patients effort)   Physical Assist/Nonphysical Assist: Dress Lower Body 1 person assist   Grooming   Level of [place]: Grooming other (see comments)   Physical Assist/Nonphysical Assist: Grooming (set-up seated, increased effort)   Activities of Daily Living Analysis   Impairments Contributing to Impaired Activities of Daily Living balance impaired;pain;post surgical precautions;strength decreased   General Therapy Interventions   Planned Therapy Interventions  "ADL retraining;strengthening;transfer training;progressive activity/exercise   Clinical Impression   Criteria for Skilled Therapeutic Interventions Met yes, treatment indicated   OT Diagnosis decreased ADL performance   Influenced by the following impairments pain, weakness, balance   Assessment of Occupational Performance 3-5 Performance Deficits   Identified Performance Deficits dressing, toileting, bathing, household mgmt, functional mobility   Clinical Decision Making (Complexity) Moderate complexity   Therapy Frequency daily   Predicted Duration of Therapy Intervention (days/wks) 3 days   Anticipated Discharge Disposition Transitional Care Facility   Risks and Benefits of Treatment have been explained. Yes   Patient, Family & other staff in agreement with plan of care Yes   [facility] TM \"6 Clicks\"   2016, Trustees of [facility], under license to [employer].  All rights reserved.   6 Clicks Short Forms Daily Activity Inpatient Short Form   [facility]  \"6 Clicks\" Daily Activity Inpatient Short Form   1. Putting on and taking off regular lower body clothing? 2 - A Lot   2. Bathing (including washing, rinsing, drying)? 2 - A Lot   3. Toileting, which includes using toilet, bedpan or urinal? 3 - A Little   4. Putting on and taking off regular upper body clothing? 3 - A Little   5. Taking care of personal grooming such as brushing teeth? 3 - A Little   6. Eating meals? 4 - None   Daily Activity Raw Score (Score out of 24.Lower scores equate to lower levels of function) 17   Total Evaluation Time   Total Evaluation Time (Minutes) 15[MR1.1]        Revision History        User Key Date/Time User Provider Type Action    > MR1.1 12/27/2017 12:22 PM Carlos Mack OT Occupational Therapist Sign                                                      INTERAGENCY TRANSFER FORM - LAB / IMAGING / EKG / EMG RESULTS   12/20/2017                       [facility] " "SPECIALITIES: [phone]            Unresulted Labs (24h ago through future)    Start       Ordered    12/27/17 0600  Creatinine  EVERY THREE DAYS,   Routine     Comments:  Last Lab Result: Creatinine (mg/dL)       Date                     Value                 12/22/2017               0.68             ----------    12/25/17 0858    12/24/17 0600  Platelet count  (enoxaparin (LOVENOX) (Weight  kg with CrCl greater than 30 mL/min is prechecked))  EVERY THREE DAYS,   Routine     Comments:  Repeat every 3 days while on VTE prophylaxis. If no result is listed, this lab has not been done the past 365 days. LATEST LAB RESULT: Platelet Count (10e9/L)       Date                     Value                 12/21/2017               257              ----------      12/22/17 0102    Unscheduled  Potassium  (Potassium Replacement - \"Standard\" - For K levels less than 3.4 mmol/L - UU,UR,UA,RH,SH,PH,WY )  CONDITIONAL (SPECIFY),   Routine     Comments:  Obtain Potassium Level for these conditions:  *IF no potassium result within 24 hours before initiation of order set, draw potassium level with next lab collect.    *2 HOURS AFTER last IV potassium replacement dose and 4 hours after an oral replacement dose.  *Next morning after potassium dose.     Repeat Potassium Replacement if necessary.    12/20/17 1620         Lab Results - 3 Days      Creatinine [[id number]] (Abnormal)  Resulted: 12/27/17 0820, Result status: Final result    Ordering provider: Jodie Gambino [facility]  12/27/17 0000 Resulting lab: [facility]    Specimen Information    Type Source Collected On   Blood  12/27/17 0729          Components       Value Reference Range Flag Lab   Creatinine 0.51 0.52 - 1.04 mg/dL L FrStHsLb   GFR Estimate >90 >60 mL/min/1.7m2  FrStHsLb   Comment:  Non  GFR Calc   GFR Estimate If Black >90 >60 mL/min/1.7m2  FrStHsLb   Comment:   GFR Calc            Platelet count [[id number]]  " Resulted: 17 0801, Result status: Final result    Ordering provider: Olivia Alarcon MD  17 0000 Resulting lab: [facility]    Specimen Information    Type Source Collected On   Blood  17 0729          Components       Value Reference Range Flag Lab   Platelet Count 280 150 - 450 10e9/L  FrStHsLb            Platelet count [[id number]]  Resulted: 17 0734, Result status: Final result    Ordering provider: Olivia Alarcon MD  17 0000 Resulting lab: [facility]    Specimen Information    Type Source Collected On   Blood  17 0655          Components       Value Reference Range Flag Lab   Platelet Count 313 150 - 450 10e9/L  FrStHsLb            Testing Performed By     Lab - Abbreviation Name Director Address Valid Date Range     [facility] Unknown [address] [place] [address] 05/08/15 1057 - Present               ECG/EMG Results      Echocardiogram Complete [[id number]]  Resulted: 17 1216, Result status: Edited Result - FINAL    Ordering provider: Tres Carter MD  17 1155 Resulted by: Riki Jamil MD    Performed: 17 1207 - 17 1327 Resulting lab: RADIOLOGY RESULTS    Narrative:       [id number]  Critical access hospital  [id number]  [id number]^OFELIA^TRES^CHRYSTAL           [facility]  Echocardiography Laboratory  [address] [place] [address]        Name: KINJAL REYES  MRN: [id number]  : 1925  Study Date: 2017 12:16 PM  Age: 89 yrs  Gender: Female  Patient [facility]  Reason For Study: Murmur in the context of acute bowel obstruction  History: bowel obstruction.  Ordering Physician: TRES CARTER  Performed By: Marcelo Garcia RDCS     BSA: 1.5 m2  Height: 58 in  Weight: 135 lb  HR: 93  BP: 111/42 mmHg  _____________________________________________________________________________  __        Procedure  Complete Portable Echo  Adult.  _____________________________________________________________________________  __        Interpretation Summary     Findings consistent with hypertensive heart disease and hyperdynamic  circulation.     1. Moderate to severe concentric left ventricular hypertrophy, with  hyperdynamic systolic function. Visually, LVEF is estimated at 70-75%.  2. There is dynamic mid cavitary obstruction with a peak gradient of 61 mmHg  (post Valsalva).  2. No evidence of dynamic mitral regurgitation.  3. Normal right ventricular size with hyperdynamic systolic function.  4. Trileaflet aortic valve sclerosis, without hemodynamically significant  stenosis.  5. Mild tricuspid regurgitation.     There is no comparison study available.  _____________________________________________________________________________  __        Left Ventricle  The left ventricle is normal in size. There is severe concentric left  ventricular hypertrophy. Hyperdynamic left ventricular function. The visual  ejection fraction is estimated at 75%. Left ventricular diastolic function is  indeterminate. No regional wall motion abnormalities noted.     Right Ventricle  The right ventricle is normal size. Hyperdynamic right ventricular function.     Atria  The left atrium is moderately dilated. Right atrial size is normal. There is  no color Doppler evidence of an atrial shunt.     Mitral Valve  The mitral valve leaflets appear normal. There is no evidence of stenosis,  fluttering, or prolapse. There is mild mitral annular calcification. There is  trace mitral regurgitation.        Tricuspid Valve  Normal tricuspid valve. There is mild (1+) tricuspid regurgitation. The right  ventricular systolic pressure is approximated at 52.1 mmHg plus the right  atrial pressure.     Aortic Valve  The aortic valve is trileaflet with aortic valve sclerosis. There is trace  aortic regurgitation. No hemodynamically significant valvular aortic stenosis.     Pulmonic Valve  The  pulmonic valve is not well visualized. There is trace pulmonic valvular  regurgitation.     Vessels  Normal size aorta. Normal size ascending aorta. (2.9 centimeters). The IVC is  normal in size and reactivity with respiration, suggesting normal central  venous pressure.     Pericardium  There is no pericardial effusion.        Rhythm  Sinus rhythm was noted.  _____________________________________________________________________________  __  MMode/2D Measurements & Calculations     IVSd: 1.1 cm  LVIDd: 4.3 cm  LVIDs: 1.8 cm  LVPWd: 1.1 cm  FS: 57.1 %  EDV(Teich): 81.1 ml  ESV(Teich): 10.1 ml  LV mass(C)d: 159.7 grams  LV mass(C)dI: 103.7 grams/m2  Ao root diam: 2.8 cm  LA dimension: 3.8 cm  asc Aorta Diam: 2.9 cm  LA/Ao: 1.3  LA Volume (BP): 52.3 ml  LA Volume Index (BP): 34.0 ml/m2  RWT: 0.53           Doppler Measurements & Calculations  MV E max stephen: 113.0 cm/sec  MV A max stephen: 161.0 cm/sec  MV E/A: 0.70  MV dec time: 0.26 sec  TR max stephen: 361.1 cm/sec  TR max P.1 mmHg  E/E' av.1  Lateral E/e': 24.2  Medial E/e': 20.0           _____________________________________________________________________________  __           Report approved by: Dr Riki Vizcarra 2017 04:02 PM       1    Type Source Collected On     17 1216          View Image (below)              Encounter-Level Documents:     There are no encounter-level documents.      Order-Level Documents:     There are no order-level documents.

## 2017-12-20 NOTE — ED NOTES
"[facility]  ED Nurse Handoff Report    ED Chief complaint: Abdominal Pain (pt c/o abd pain, vomiting and constipation)      ED Diagnosis:   Final diagnoses:   None       Code Status: to be addressed by md    Allergies:   Allergies   Allergen Reactions     Penicillins        Activity level - Baseline/Home:  Stand with Assist    Activity Level - Current:   Stand with Assist     Needed?: No    Isolation: No  Infection: Not Applicable    Bariatric?: No    Vital Signs:   Vitals:    12/20/17 0929   BP: 134/60   Pulse: 90   Resp: 20   Temp: 98.2  F (36.8  C)   TempSrc: Oral   SpO2: 98%   Weight: 58.5 kg (129 lb)   Height: 1.473 m (4' 10\")       Cardiac Rhythm: ,        Pain level:      Is this patient confused?: No    Patient Report:  Rob note: Mary Grace Rivers is a 89 year old female who presents with abdominal pain. The patient was seen here in the ED following an episode of unconsciousness after drinking alcohol several weeks ago. At that time she was found to have a UTI, and was started on a course of antibiotics. She did not initially handle this well, with many episodes of nausea and vomiting, so her primary care physician switched her to Cipro. She finished her course of this 4 days ago, and since that time has been experiencing multiple symptoms. These include abdominal pain and distension, nausea, vomiting, loss of appetite, and constipation. She has continued making urine however, and denies any urinary symptoms or fever associated with this. The patient reports that she has not been drinking any alcohol since her last visit, and that episode was due to accidentally mixing two liquors.    Nurse report: CT abd shows an obstruction. She has received morphine and zofran and is feeling a little better. I was unable to pass a 16fr NG tube, the tube kept coming out of her mouth and small amt of bleeding from her nares, our md is aware and we will wait for surgery to eval. bp stable " 145/79    Family Comments: daughter at bedside    OBS brochure/video discussed/provided to patient: No    ED Medications:   Medications   0.9% sodium chloride BOLUS (1,000 mLs Intravenous New Bag 12/20/17 1012)     Followed by   0.9% sodium chloride infusion (not administered)   morphine (PF) injection 4 mg (4 mg Intravenous Given 12/20/17 1016)   ondansetron (ZOFRAN) injection 4 mg (4 mg Intravenous Given 12/20/17 1014)   Saline Flush (64 mLs Intravenous Given 12/20/17 1038)   iopamidol (ISOVUE-370) solution 78 mL (78 mLs Intravenous Given 12/20/17 1039)       Drips infusing?:  No      ED NURSE PHONE NUMBER: [phone]

## 2017-12-20 NOTE — CONSULTS
[facility]  Colon and Rectal Surgery Consult Note  Name: Mary Grace Rivers    MRN: [id number]  YOB: 1925    Age: 89 year old  Date of admission: 12/20/2017  Primary care provider: System, Provider Not In     Requesting Physician:  Dr. Carter  Reason for consult:  Abdominal distention and possible mass           History of Present Illness:   Mary Grace Rivers is a 89 year old female, seen at the request of Dr. Carter, presents with abdominal pain. The patient was recently in the ER following an episode of unconsciousness after drinking alcohol. She was found to have an UTI and started on antibiotics. She was switched to Cipro and finished the course. She has been having continued symptoms of abdominal pain, distention, nausea, vomiting, loss of appetite and constipation. She states that she's been passing gas and last passed gas today. She has been having bowel movements but only small amounts throughout the day. She denies mucous or blood with her stools. Per the daughter [place] and the daughter has noticed a significant decrease in the patient's energy. The patient has had a cholecystectomy in the past. No other abdominal surgeries. She has no family history of colon cancer in her family. Her daughter and some of her brothers have had colon polyps but the daughter is unsure of the kind.           Colonoscopy History:  Per the patient's daughter the patient had a colonoscopy 10 years ago and there was an unresectable colon polyp that was left.                Past Medical History:     Past Medical History:   Diagnosis Date     Cancer (H)     breast     Depressive disorder      Hypertension              Past Surgical History:     Past Surgical History:   Procedure Laterality Date     BREAST SURGERY       CHOLECYSTECTOMY       GYN SURGERY       ORTHOPEDIC SURGERY                 Social History:     Social History   Substance Use Topics     Smoking status: Former Smoker  "    Smokeless tobacco: Never Used     Alcohol use No             Family History:   No family history on file.          Allergies:     Allergies   Allergen Reactions     Penicillins              Medications:       sodium chloride (PF)  3 mL Intracatheter Q8H             Review of Systems:   A comprehensive greater than 10 system review of systems was carried out.  Pertinent positives and negatives are noted above.  Otherwise negative for contributory info.            Physical Exam:     Blood pressure 121/56, pulse 79, temperature 97.8  F (36.6  C), temperature source Oral, resp. rate 16, height 1.473 m (4' 10\"), weight 58.5 kg (129 lb), SpO2 94 %, not currently breastfeeding.  No intake or output data in the 24 hours ending 12/20/17 1639  Exam:  General - Awake alert and oriented, appears stated age  Pulm - Non-labored breathing with normal respiratory effort  CVS - reg rate and rhythm, no peripheral edema  Abd - soft, distended, firmness palpated in the RLQ. Tenderness to palpation in the LLQ. No rebound or guarding. , Rectal exam was not performed.   Neuro - CN II-XII grossly intact  Musculoskeletal - extremeties with no clubbing, cyanosis or edema; able to ambulate  Psych - responsive, alert, cooperative; oriented x3; appropriate mood and affect  External/skin - inspection reveals no rashes, lesions or ulcers, normal coloring           Data Reviewed:     Results for orders placed or performed during the hospital encounter of 12/20/17   XR Chest 2 Views    Narrative    CHEST TWO VIEWS  12/20/2017 10:52 AM    HISTORY:  Chest and abdominal pain.    COMPARISON:  12/6/2017      Impression    IMPRESSION:  No infiltrates or acute process. Heart and pulmonary  vasculature within normal limits. Aortic calcification. Surgical clips  project over the lower lateral left hemithorax.     JAZZMINE BELTRAN MD   CT Abdomen Pelvis w Contrast    Narrative    CT ABDOMEN/PELVIS WITH CONTRAST December 20, 2017 10:42 AM     HISTORY: " Distended tender abdomen with vomiting.     CONTRAST DOSE: 78 mL Isovue-370    TECHNIQUE: Radiation dose for this scan was reduced using automated  exposure control, adjustment of the mA and/or kV according to patient  size, or iterative reconstruction technique.    FINDINGS: Calcified granulomas are noted at the lung bases. However,  the upper most image, there is a nonspecific 0.4 cm left lower lobe  pulmonary nodule. Multiple hepatic hypodensities are noted. Several  these are ill-defined and therefore indeterminate. Intrahepatic  biliary prominence is noted which may be related to  postcholecystectomy state. Multiple bilateral renal cysts are noted.  Thin rim of calcification is noted within a right superior pole cyst.  Hyperdense cyst is noted in the left renal lower pole. A  nonobstructing stone is also noted within the left mid pole. There is  marked fluid dilatation of the colon extending from the cecum to the  splenic flexure suggesting colonic obstruction. Descending and sigmoid  colon are decompressed. No free peritoneal fluid or air is  demonstrated. Sigmoid diverticulosis is noted without adjacent  inflammatory stranding to clearly indicate diverticulitis.      Impression    IMPRESSION:  1. Colonic obstruction at the splenic flexure. An underlying mass  lesion cannot be excluded.  2. Sigmoid/descending colonic diverticulosis without CT evidence of  diverticulitis.  3. Hepatic several small hypodensities which due to their ill-defined  nature are indeterminate and could represent small hepatic metastasis.  4. Left lower lobe pulmonary nonspecific 0.4 cm nodule on the  uppermost image.  5. Bilateral renal cysts, two of which appear complex.    ROSA COLLINS MD     5:21pm. Dr. Velázquez  Was called and we reviewed the CT scan. 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       Recent Labs  Lab 12/20/17  0940   WBC 14.8*   HGB 11.5*   HCT 36.8   MCV 74*           Recent Labs  Lab 12/20/17  0940      POTASSIUM 4.2   CHLORIDE 106   CO2 22   ANIONGAP 8   *   BUN 18   CR 0.89   GFRESTIMATED 59*   GFRESTBLACK 72   LUPE 9.4   PROTTOTAL 7.0   ALBUMIN 2.8*   BILITOTAL 0.8   ALKPHOS 81   AST 16   ALT 12       Recent Labs  Lab 12/20/17  0940   INR 1.02     No results for input(s): LACT in the last 168 hours.    Recent Labs  Lab 12/20/17  1510   COLOR Yellow   APPEARANCE Clear   URINEGLC Negative   URINEBILI Negative   URINEKETONE Negative   SG 1.005   UBLD Negative   URINEPH 6.0   PROTEIN 30*   NITRITE Negative   LEUKEST Trace*   RBCU 2   WBCU 3*         Assessment and Plan:   Mary Grace is a 89 year old woman who comes in for issues with abdominal pain, bloating, nausea and vomiting. The patient recently completed a course of antibiotics for a UTI after being seen in the ER 2 weeks ago. She has had intermittent nausea, vomiting, constipation, bloating and abdominal pain. The patient and her daughter attributed this to the multiple antibiotics she has been on. However, she completed the antibiotics 4 days ago and has had worsening nausea, vomiting and abdominal pain so presented to the ER. CT scan done show an obstruction at the splenic flexure as well as a cecal mass.   Plan:  1. Admit to hospitalist  2. Surgery: It was discussed that the patient will need decompression with a diverting ostomy vs hospice  3. Diet: NPO. Place NG tube  4. IV Fluids: per hospitalist  5. Antibiotics:  None indicated  6. Medications:    7. I&O s:  strict I&O s  8. Labs:   - Reviewed: myself  - Ordered: CEA   9. Imaging:   - Dr. Perez and myself have personally viewed: CT abd/pelvis. CT results also discussed with Dr. Velázquez and additionally found to have an intra-luminal cecal mass.   - Ordered:  none  10. Activity:  OOB, ambulate  11. DVT prophylaxis: SCD s,   12. This plan has been discussed with Dr. Perez    Patient specific identified risk factors considered as part of today s evaluation  include: age    Additional history obtained from chart review and daughter.  Time spent on consultation: 40 greater than 50% of total encounter time was spent in counseling and or coordination of care.            Justa Awad PA-C  [facility]  Phone: [phone]  Fax: [phone]

## 2017-12-20 NOTE — IP AVS SNAPSHOT
"` Luisa     [place] [address] [facility]: [phone]                                              INTERAGENCY TRANSFER FORM - NURSING   2017                    Hospital Admission Date: 2017  KINJAL REYES   : 1925  Sex: Female        Attending Provider: Ryan Carter MD     Allergies:  Penicillins    Infection:  None   Service:  HOSPITALIST    Ht:  1.473 m (4' 10\")   Wt:  60.5 kg (133 lb 6.1 oz)   Admission Wt:  58.5 kg (129 lb)    BMI:  27.88 kg/m 2   BSA:  1.57 m 2            Patient PCP Information     Provider PCP Type    Provider Not In System General      Current Code Status     Date Active Code Status Order ID Comments User Context       Prior      Code Status History     Date Active Date Inactive Code Status Order ID Comments User Context    2017  3:10 PM  Full Code [id number]  Belen Richards MD Outpatient    2017  4:20 PM 2017  3:10 PM Full Code [id number]  Ryan Carter MD Inpatient      Advance Directives        Does patient have a scanned Advance Directive/ACP document in EPIC?           No        Hospital Problems as of 2017              Priority Class Noted POA    Colonic mass Medium  2017 Yes    Colon cancer (H) Medium  2017 Yes      Non-Hospital Problems as of 2017     None      Immunizations     None         END      ASSESSMENT     Discharge Profile Flowsheet     EXPECTED DISCHARGE     FINAL RESOURCES      Expected Discharge Date  17 (or 28, tcu) 17 0755   Senior Linkage Line Referral Placed  17 1447    DISCHARGE NEEDS ASSESSMENT     SKIN      Equipment Currently Used at Home  walker, rolling;grab bar;shower chair 17 1222   Inspection of bony prominences  Full 17 0915    # of Referrals Placed by CTS  Post Acute Facilities;Senior Linkage Line 17 1447   Inspection under devices  Full 17 0915    GASTROINTESTINAL (ADULT,PEDIATRIC,OB)     Skin WDL  " "ex 12/27/17 0915    GI WDL  ex (colostomy) 12/27/17 0915   Skin Color/Characteristics  redness blanchable 12/27/17 0915    Abdominal Appearance  rounded 12/25/17 0331   Skin Temperature  warm 12/27/17 0915    All Quadrants Bowel Sounds  audible and normoactive 12/27/17 0915   Skin Moisture  dry 12/27/17 0915    Last Bowel Movement  12/24/17 12/24/17 1742   Skin Integrity  incision(s) 12/27/17 0915    GI Signs/Symptoms  gas discomfort 12/26/17 1620   SAFETY      Passing flatus  yes 12/27/17 0915   Safety WDL  WDL 12/27/17 0915    COMMUNICATION ASSESSMENT     Safety Factors  ID band on;upper side rails raised x 2;call light in reach;wheels locked;bed in low position 12/27/17 0318    Patient's communication style  spoken language (English or Bilingual) 12/20/17 0925   All Alarms  alarm(s) activated and audible 12/27/17 0318                 Assessment WDL (Within Defined Limits) Definitions           Safety WDL     Effective: 09/28/15    Row Information: <b>WDL Definition:</b> Bed in low position, wheels locked; call light in reach; upper side rails up x 2; ID band on<br> <font color=\"gray\"><i>Item=AS safety wdl>>List=AS safety wdl>>Version=F14</i></font>      Skin WDL     Effective: 09/28/15    Row Information: <b>WDL Definition:</b> Warm; dry; intact; elastic; without discoloration; pressure points without redness<br> <font color=\"gray\"><i>Item=AS skin wdl>>List=AS skin wdl>>Version=F14</i></font>      Vitals     Vital Signs Flowsheet     VITAL SIGNS     Nonverbal Indicators of Pain  Moaning;Restless (yelling out) 12/22/17 0756    Temp  98  F (36.7  C) 12/27/17 0737   Pain Management Interventions  Relaxation 12/22/17 0756    Temp src  Oral 12/27/17 0737   Response to Interventions  Relief 12/22/17 0005    Resp  18 12/27/17 0737   ANALGESIA SIDE EFFECTS MONITORING      Pulse  91 12/27/17 0737   Side Effects Monitoring: Respiratory Quality  R 12/26/17 0246    Heart Rate  93 12/27/17 0021   Side Effects Monitoring: " "Respiratory Depth  N 12/26/17 0246    Pulse/Heart Rate Source  Monitor 12/27/17 0737   Side Effects Monitoring: Sedation Level  S 12/26/17 0246    BP  150/61 12/27/17 0737   HEIGHT AND WEIGHT      BP Location  Right arm 12/27/17 0737   Height  1.473 m (4' 10\") 12/20/17 0934    OXYGEN THERAPY     Weight  60.5 kg (133 lb 6.1 oz) 12/27/17 0606    SpO2  92 % 12/27/17 0737   Weight Method  Bed scale 12/27/17 0606    O2 Device  None (Room air) 12/27/17 0737   Bed Scale  Standard (fitted sheet, draw sheet/ pad, cover/flat sheet, blanket, two pillows);Pillow (add comment for number);Fitted sheet;Draw sheet/ pad (add comment for number);Cover/flat sheet (add comment for number);Seagraves (add comment for number) 12/27/17 0606    FiO2 (%)  55 % 12/22/17 1906   BSA (Calculated - sq m)  1.55 12/20/17 0934    Oxygen Delivery  2 LPM 12/24/17 1906   BMI (Calculated)  27.02 12/20/17 0934    RESPIRATORY MONITORING     JOEY COMA SCALE      Respiratory Monitoring (EtCO2)  33 mmHg 12/22/17 0135   Best Eye Response  4-->(E4) spontaneous 12/26/17 0918    Integrated Pulmonary Index (IPI)  5-6 12/22/17 0135   Best Motor Response  6-->(M6) obeys commands 12/26/17 0918    PAIN/COMFORT     Best Verbal Response  5-->(V5) oriented 12/26/17 0918    Patient Currently in Pain  denies 12/27/17 0021   Joey Coma Scale Score  15 12/26/17 0918    Preferred Pain Scale  number (Numeric Rating Pain Scale) 12/26/17 0909   DAILY CARE      Patient's Stated Pain Goal  No pain 12/20/17 2303   Activity Management  activity encouraged 12/27/17 0915    0-10 Pain Scale  6 12/26/17 2154   Activity Assistance Provided  assistance, 1 person 12/27/17 0915    Word Pain Scale  8 12/22/17 0756   Assistive Device Utilized  gait belt;walker 12/27/17 0915    Pain Location  Knee 12/26/17 2155   Additional Documentation  Activity Device Assistance (Row) 12/21/17 2054    Pain Orientation  Left 12/26/17 2155   POSITIONING      Pain Descriptors  Discomfort " 12/25/17 2115   Body Position  independently positioning 12/27/17 0915    Pain Management Interventions  analgesia administered 12/23/17 2041   Head of Bed (HOB)  HOB at 20-30 degrees 12/27/17 0915    Pain Intervention(s)  Medication (See eMAR) 12/26/17 2155   Positioning/Transfer Devices  pillows 12/27/17 0915    Response to Interventions  Absence of nonverbal indicators of pain 12/25/17 0324   Chair  Upright in chair 12/27/17 0915    PAIN ASSESSMENT/NONVERBAL ICU (ADULT)     ECG      Presence of Pain  No nonverbal indicators of pain present 12/22/17 0756   ECG Rhythm  Normal sinus rhythm 12/21/17 2331    Assessment Indicator  Post intervention;Patient not able 12/22/17 0005                 Patient Lines/Drains/Airways Status    Active LINES/DRAINS/AIRWAYS     Name: Placement date: Placement time: Site: Days: Last dressing change:    Colostomy 12/21/17 2214   RLQ   5     Peripheral IV 12/24/17 Right Hand 12/24/17   1447   Hand   3     Incision/Surgical Site 12/21/17 Abdomen 12/21/17 2132    5             Patient Lines/Drains/Airways Status    Active PICC/CVC     None            Intake/Output Detail Report     Date Intake       Output       Net    Shift P.O. I.V. NG/GT IV Piggyback Total Urine Emesis/NG output Stool Blood Total       Day 12/26/17 0700 - 12/26/17 1459 -- -- -- -- -- 1300 -- 125 -- 1425 -1425    Vero 12/26/17 1500 - 12/26/17 2259 250 -- -- -- 250 1200 -- 340 -- 1540 -1290    Noc 12/26/17 2300 - 12/27/17 0659 240 -- -- -- 240 1475 -- -- -- 1475 -1235    Day 12/27/17 0700 - 12/27/17 1459 -- -- -- -- -- -- -- -- -- -- 0    Vero 12/27/17 1500 - 12/27/17 2259 -- -- -- -- -- -- -- -- -- -- 0      Last Void/BM       Most Recent Value    Urine Occurrence 1 at 12/26/2017 2000    Stool Occurrence 1 at 12/26/2017 0553      Case Management/Discharge Planning     Case Management/Discharge Planning Flowsheet     REFERRAL INFORMATION     COPING/STRESS      Did the Initial Social Work Assessment result in a Social  Work Case?  Yes 12/25/17 1238   Major Change/Loss/Stressor  residence change 12/20/17 1416    Admission Type  inpatient 12/25/17 1238   EXPECTED DISCHARGE      Arrived From  home or self-care 12/25/17 1238   Expected Discharge Date  12/27/17 (or 28, tcu) 12/26/17 0755    Referral Source  physician 12/25/17 1238   FINAL RESOURCES      # of Referrals Placed by CTS  Post Acute Facilities;Senior Linkage Line 12/27/17 1447   Equipment Currently Used at Home  walker, rolling;grab bar;shower chair 12/27/17 1222    Post Acute Facilities  TCU 12/25/17 1238   Senior Linkage Line Referral Placed  12/27/17 12/27/17 1447    Reason For Consult  discharge planning 12/25/17 1238   ABUSE RISK SCREEN      Record Reviewed  clinical discipline documentation;history and physical;medical record;patient profile;plan of care 12/25/17 1238   QUESTION TO PATIENT:  Has a member of your family or a partner(now or in the past) intimidated, hurt, manipulated, or controlled you in any way?  no 12/20/17 1416    CTS Assigned to Case  Lexii Saleh 12/27/17 1447   QUESTION TO PATIENT: Do you feel safe going back to the place where you are living?  yes 12/20/17 1416    LIVING ENVIRONMENT     OBSERVATION: Is there reason to believe there has been maltreatment of a vulnerable adult (ie. Physical/Sexual/Emotional abuse, self neglect, lack of adequate food, shelter, medical care, or financial exploitation)?  no 12/20/17 1416    Lives With  alone 12/27/17 1222   (R) MENTAL HEALTH SUICIDE RISK      Living Arrangements  apartment 12/27/17 1222   Are you depressed or being treated for depression?  Yes 12/20/17 1416

## 2017-12-20 NOTE — IP AVS SNAPSHOT
[place] [address] [facility]    [address] [place] [address]    Phone:  [phone]                                       After Visit Summary   12/20/2017    Mary Grace Rivers    MRN: [id number]           After Visit Summary Signature Page     I have received my discharge instructions, and my questions have been answered. I have discussed any challenges I see with this plan with the nurse or doctor.    ..........................................................................................................................................  Patient/Patient Representative Signature      ..........................................................................................................................................  Patient Representative Print Name and Relationship to Patient    ..................................................               ................................................  Date                                            Time    ..........................................................................................................................................  Reviewed by Signature/Title    ...................................................              ..............................................  Date                                                            Time

## 2017-12-20 NOTE — PHARMACY-ADMISSION MEDICATION HISTORY
Admission medication history interview status for the 12/20/2017  admission is complete. See EPIC admission navigator for prior to admission medications     Medication history source reliability:Good    Actions taken by pharmacist (provider contacted, etc):None     Additional medication history information not noted on PTA med list :    Patient's family member was present and was able to help with med hx info.     Medication reconciliation/reorder completed by provider prior to medication history? No    Time spent in this activity: 10 mins    Prior to Admission medications    Medication Sig Last Dose Taking? Auth Provider   Amlodipine-Olmesartan (MOSES) 10-40 MG TABS Take 1 tablet by mouth daily 12/19/2017 at am Yes Unknown, Entered By History   polyethylene glycol (MIRALAX/GLYCOLAX) Packet Take 17 g by mouth daily 12/20/2017 at . Vomited shortler after dose.  Yes Unknown, Entered By History   bisacodyl (DULCOLAX) 10 MG Suppository Place 10 mg rectally daily as needed for constipation Past Week at prn Yes Unknown, Entered By History   Acetaminophen (TYLENOL PO) Take 325-650 mg by mouth every 4 hours as needed for mild pain or fever Past Week at prn Yes Unknown, Entered By History       Isabel Garcia

## 2017-12-20 NOTE — IP AVS SNAPSHOT
MRN:[id number]                      After Visit Summary   12/20/2017    Mary Grace Rivers    MRN: [id number]           Thank you!     Thank you for choosing [facility] for your care. Our goal is always to provide you with excellent care. Hearing back from our patients is one way we can continue to improve our services. Please take a few minutes to complete the written survey that you may receive in the mail after you visit with us. Thank you!        Patient Information     Date Of Birth          6/14/1925        Designated Caregiver       Most Recent Value    Caregiver    Will someone help with your care after discharge? yes    Name of designated caregiver Mary Grace Rivers    Phone number of caregiver [phone]    Caregiver address [address] [facility] [address]      About your hospital stay     You were admitted on:  December 20, 2017 You last received care in the:  [place] [facility] [address] [facility]    You were discharged on:  December 27, 2017        Reason for your hospital stay       Exploratory laparoscopy with loop transverse colostomy and biopsy.                  Who to Call     For medical emergencies, please call 911.  For non-urgent questions about your medical care, please call your primary care provider or clinic, None  For questions related to your surgery, please call your surgery clinic        Attending Provider     Provider Specialty    Kamran Diggs MD Emergency Medicine    [facility], Ryan Cruz MD Internal Medicine       Primary Care Provider Fax #    Provider Not In System [phone]      After Care Instructions     Activity - Up with nursing assistance           Advance Diet as Tolerated       Follow this diet upon discharge: Low residue            Daily weights       Call Provider for weight gain of more than 2 pounds per day or 5 pounds per week.            General info for SNF       Length of Stay Estimate: Short Term Care: Estimated # of Days <30  Condition  at Discharge: Improving  Level of care:skilled   Rehabilitation Potential: Good  Admission H&P remains valid and up-to-date: Yes  Recent Chemotherapy: N/A  Use Nursing Home Standing Orders: Yes            Mantoux instructions       Give two-step Mantoux (PPD) Per Facility Policy Yes                  Follow-up Appointments     Follow Up and recommended labs and tests       Follow up in the office in 3-4 weeks with Dr. Alarcon or at your already scheduled post op visit. Call [phone] to schedule your appointment. Call the office at [phone] if you develop fever, uncontrolled pain, bleeding, nausea, vomiting, or constipation.            Follow Up and recommended labs and tests       Follow up with Nursing home physician in 2-3 days  Follow up with primary care provider after discharge home  Follow up Surgery as recommended.                  Additional Services     Occupational Therapy Adult Consult       Evaluate and treat as clinically indicated.    Reason:  Deconditioning            Physical Therapy Adult Consult       Evaluate and treat as clinically indicated.    Reason:  Deconditioning                  Further instructions from your care team       New RUQ Transverse Loop  Colostomy:   1. Pt will need teaching on Colostomy appliance change and emptying   2. Supplies with patient       1.Change barrier  2x/wk and prn   2. Empty or change closed pouch when 1/3 full of stool/gas  3. Will not harm appliance to get wet during bathing.        Blow dry (on cool setting) cloth tape and backing after bath or shower  4. Iniitally Eat 6 small meals/day. No dietary restrictions related to colostomy  5. Drink plenty of fluids  6. Always carry and emergency appliance system  7. For best results use a odor eliminator (Febreeze) in your bathroom prior to emptying/changing the appliance  8. No heavy lifting, no sit-ups but walk.  9. OK to take steps.       Supplies:  Blu New image 2pc: 20 pouch  "changes/month  1. Barrier: flextend, convex  cut to fit with tape                           #71137  5/bx = 2-4bx/month  2. Pouch: Opaque lock n roll                                                    #21872 10/bx = 1-2bx/month OR  3. Pouch: Closed opaque no  filter                                            #78958 30/bx = 1-2bx/month  4. Optional if leakage:  Adapt ring                                             #7805   10/bx = 1-2bx/month      Procedure for appliance change:  1. Draw and cut opening in barrier (If cut to fit)  following pattern, approx 2\"  2. Remove plastic backing  3. Optional if leakage:  Open ring. Stretch to approx size of opening in pouch.   4.  Optional: Place full ring around opening on sticky side of pouch. Press into place  5. Fold back edge of tape to create a \"tab\" This assists with paper removal in Step #11  6 Set barrier aside  7. Gently remove pouching system from around stoma. Discard.  8. Clean skin around stoma with disposable wipe or moist gauze. Discard.  9. Pat the skin dry  10. Center stoma in barrier opening. Press barrier to skin  11. Pull on \"tabs\" to remove paper that covers the tape. Press tape to skin  12. Close lock n roll closure on drainable pouch.  If using closed pouch it can be changed up to 2x/day      Call for any ?/concerns:  Mirtha FOSTER (ostomy nurses) @ [facility]  (C) [phone]  (W) [phone]                You are being discharged to a transitional care unit:  [facility] [address] [address]  Phone: [phone]        Pending Results     Date and Time Order Name Status Description    12/21/2017 2139 Surgical pathology exam In process     12/21/2017 1020 EKG 12-lead, tracing only Preliminary             Statement of Approval     Ordered          12/27/17 1513  I have reviewed and agree with all the recommendations and orders detailed in this document.  EFFECTIVE NOW     Approved and electronically signed by:  Belen Richards " "MD Cheryle             Admission Information     Date & Time Provider Department Dept. Phone    2017 Ryan Carter MD [place] [address] [facility] [phone]      Your Vitals Were     Blood Pressure Pulse Temperature Respirations Height Weight    150/61 (BP Location: Right arm) 91 98  F (36.7  C) (Oral) 18 1.473 m (4' 10\") 60.5 kg (133 lb 6.1 oz)    Pulse Oximetry BMI (Body Mass Index)                92% 27.88 kg/m2          [employer] Information     [employer] lets you send messages to your doctor, view your test results, renew your prescriptions, schedule appointments and more. To sign up, go to [email or web address] . Click on \"Log in\" on the left side of the screen, which will take you to the Welcome page. Then click on \"Sign up Now\" on the right side of the page.     You will be asked to enter the access code listed below, as well as some personal information. Please follow the directions to create your username and password.     Your access code is: [email or web address]  Expires: 2018  5:50 PM     Your access code will  in 90 days. If you need help or a new code, please call your [place] clinic or [phone].        Care EveryWhere ID     This is your Care EveryWhere ID. This could be used by other organizations to access your [place] medical records  [id number]        Equal Access to Services     [facility] : Hadii wade colungao Soanalia, waaxda luqadaha, qaybta kaalmamarge goyal, hai mendez . So [facility] [phone].    ATENCIÓN: Si habla español, tiene a ovalle disposición servicios gratuitos de asistencia lingüística. Llame al [phone].    We comply with applicable federal civil rights laws and [place] laws. We do not discriminate on the basis of race, color, national origin, age, disability, sex, sexual orientation, or gender identity.               Review of your medicines      START taking        Dose / Directions    enoxaparin " 40 MG/0.4ML injection   Commonly known as:  LOVENOX        Dose:  40 mg   Inject 0.4 mLs (40 mg) Subcutaneous every 24 hours   Quantity:  25 Syringe   Refills:  0       simethicone 80 MG chewable tablet   Commonly known as:  MYLICON   Used for:  Flatulence, eructation and gas pain        Dose:  80 mg   Take 1 tablet (80 mg) by mouth every 6 hours as needed for cramping   Quantity:  180 tablet   Refills:  0         CONTINUE these medicines which have NOT CHANGED        Dose / Directions    MOSES 10-40 MG Tabs   Generic drug:  Amlodipine-Olmesartan        Dose:  1 tablet   Take 1 tablet by mouth daily   Refills:  0       TYLENOL PO        Dose:  325-650 mg   Take 325-650 mg by mouth every 4 hours as needed for mild pain or fever   Refills:  0         STOP taking     bisacodyl 10 MG Suppository   Commonly known as:  DULCOLAX           polyethylene glycol Packet   Commonly known as:  MIRALAX/GLYCOLAX                Where to get your medicines      These medications were sent to [facility] - [address]  [address], [place] [address]     Phone:  [phone]     enoxaparin 40 MG/0.4ML injection         Some of these will need a paper prescription and others can be bought over the counter. Ask your nurse if you have questions.     You don't need a prescription for these medications     simethicone 80 MG chewable tablet                Protect others around you: Learn how to safely use, store and throw away your medicines at www.disposemymeds.org.             Medication List: This is a list of all your medications and when to take them. Check marks below indicate your daily home schedule. Keep this list as a reference.      Medications           Morning Afternoon Evening Bedtime As Needed    MOSES 10-40 MG Tabs   Take 1 tablet by mouth daily   Generic drug:  Amlodipine-Olmesartan                                enoxaparin 40 MG/0.4ML injection   Commonly known as:  LOVENOX    Inject 0.4 mLs (40 mg) Subcutaneous every 24 hours   Last time this was given:  40 mg on 12/26/2017  5:48 PM                                simethicone 80 MG chewable tablet   Commonly known as:  MYLICON   Take 1 tablet (80 mg) by mouth every 6 hours as needed for cramping   Last time this was given:  80 mg on 12/25/2017 10:02 PM                                TYLENOL PO   Take 325-650 mg by mouth every 4 hours as needed for mild pain or fever   Last time this was given:  1,000 mg on 12/27/2017  6:41 AM                                          More Information        Colostomy: Caring for Your Stoma     Applying an extra skin barrier, such as a wipe, helps protect the skin if stool leaks around the pouch. Wipe it in a [place] around the stoma. Then let it dry for 1 minute before putting on a new pouch.     You need to take care of your stoma and the skin around it (peristomal skin). That means keeping the stoma and the skin clean. It also means protecting the skin from moisture and contact with stool. This helps prevent skin problems and odor.  Check the stoma  Check the stoma and the skin around it each time you change your pouch.  front of a mirror, or use a hand mirror so that you can see all the way around the stoma. It should look shiny, moist, and dark pink or red. The skin around it should be smooth, with no red or broken spots.  Clean around the stoma  Clean around the stoma with warm water and a soft washcloth each time you change the pouch. Water does not harm the stoma. You can even take a bath or shower without your pouch if you choose:    There are no nerves in the stoma, so there is no feeling. Be sure to clean and dry the stoma gently. You could injure the stoma without knowing it.    The stoma may bleed a little when you clean it. That s because there are tiny blood vessels in the tissue.  Protect the skin around the stoma  For the pouch to stick well, the skin around the stoma needs to be  dry and smooth. If the skin is moist or uneven, the pouch is more likely to leak. A leaky pouch will irritate the skin. It can also cause odor:    To help keep the skin healthy, pat it dry after you wash it.    If you like, apply an extra skin barrier, such as a wipe, before you put on a new pouch. This helps protect the skin if stool leaks around the pouch.  Common causes of skin problems  Common causes include the following:    A leaking pouch can make the skin red and weepy. Use a measuring guide to check that the opening on the pouch is the correct size.    Hair under the pouch can make the skin inflamed. To avoid this, shave off any hair around the stoma with an electric razor. Always shave away from the stoma.    Allergies to skin barriers can make the skin itch, burn, or sting. You may need to try a new skin barrier or change to a new kind of pouch.    Yeast infections can make the skin red and itchy. Sweat under the pouch makes these infections more likely. A pouch cover can help keep the skin dry.  Call your enterostomal therapy (ET) nurse or other healthcare provider  Contact your healthcare provider if you have any of the following:    The skin around the stoma is red, weepy, bleeding, or broken.    The skin around the stoma itches, burns, stings, or has white spots.    The stoma swells, changes color, or bleeds without stopping.    The stoma becomes even with or sinks below the skin, or it sticks up more than normal.   Date Last Reviewed: 8/1/2016 2000-2017 [employer]. [address] [place] [address]. All rights reserved. This information is not intended as a substitute for professional medical care. Always follow your healthcare professional's instructions.                Colostomy: Changing Your Pouch    Your healthcare provider gave you a stoma (new opening for stool to pass from the body) during surgery. Stool starts to pass from the stoma soon after surgery. That means  you ll need to learn how to change your pouch before you go home. You usually need to change your drainable pouch every 5 to 7 days, but you will empty your pouch more often. To change your pouch, follow the steps below. Start by gathering what you ll need:    Plastic bags    Soft washcloth    Toilet paper    New pouch    Extra skin protection    Scissors (if needed)    Clean towel  1. Remove the used pouch  Steps to removing the used pouch are as follows:    If you use a drainable pouch, empty it first. Sit on or next to the toilet. Set the clamp aside.    Start at the upper edge of the skin barrier. Carefully push the skin away from the skin barrier with one hand. Slowly peel back the skin barrier with the other hand.    Peel all the way around the skin barrier until the pouch comes off.    Seal the pouch in a plastic bag. Then put it in a second plastic bag. Throw it away in a trash bin. Some people empty the pouch into the toilet first.   2. Clean around the stoma  Steps to cleaning around the stoma are as follows:      Wipe any stool off the skin around the stoma with toilet paper.    Clean the skin with warm water and a soft washcloth. Wash right up to the edge of the stoma. Pat the skin dry with a clean towel.    If needed, put on extra skin protection, such as moisture barrier paste, cream, or powder.  3. Put on the new pouch  Steps to putting on the new pouch are as follows:    If you don t use a pouch with a precut skin barrier, size and cut the opening (no more than one-eighth inch bigger than the stoma) and peel the backing off the skin barrier. Carefully place it over the stoma.    If you use a 2-piece pouch, snap the pouch onto the barrier. Start at the bottom and work your fingers around the flange.    Press the barrier against your skin with your hand over the barrier and hold it in place for 45 seconds. This molds the barrier to your skin.      If you use a drainable pouch, clamp the tail.    Wash  your hands for at least 20 seconds when you are done. (Hum Happy Birthday twice if you need a timer.)   Call your ostomy nurse or other healthcare provider  Call your healthcare provider if:    The skin around the stoma is red, weepy, bleeding, or broken.    The skin around the stoma itches, burns, stings, or has white spots.    The stoma swells, changes color, or bleeds without stopping    The stoma changes size, becomes even with, or sinks below the skin, or sticks up more than normal.   Date Last Reviewed: 7/1/2016 2000-2017 [employer]. [address] [place] [address]. All rights reserved. This information is not intended as a substitute for professional medical care. Always follow your healthcare professional's instructions.

## 2017-12-20 NOTE — IP AVS SNAPSHOT
"[place] [address] [facility]: [phone]                                              INTERAGENCY TRANSFER FORM - PHYSICIAN ORDERS   2017                    Hospital Admission Date: 2017  KINJAL REYES   : 1925  Sex: Female        Attending Provider: Ryan Carter MD     Allergies:  Penicillins    Infection:  None   Service:  HOSPITALIST    Ht:  1.473 m (4' 10\")   Wt:  60.5 kg (133 lb 6.1 oz)   Admission Wt:  58.5 kg (129 lb)    BMI:  27.88 kg/m 2   BSA:  1.57 m 2            Patient PCP Information     Provider PCP Type    Provider Not In System General      ED Clinical Impression     Diagnosis Description Comment Added By Time Added    Large bowel obstruction [K56.609] Large bowel obstruction [K56.609]  Kamran Diggs MD 2017 11:43 AM      Hospital Problems as of 2017              Priority Class Noted POA    Colonic mass Medium  2017 Yes    Colon cancer (H) Medium  2017 Yes      Non-Hospital Problems as of 2017     None      Code Status History     Date Active Date Inactive Code Status Order ID Comments User Context    2017  3:10 PM  Full Code [id number]  Belen Richards MD Outpatient    2017  4:20 PM 2017  3:10 PM Full Code [id number]  Ryan Carter MD Inpatient         Medication Review      START taking        Dose / Directions Comments    enoxaparin 40 MG/0.4ML injection   Commonly known as:  LOVENOX        Dose:  40 mg   Inject 0.4 mLs (40 mg) Subcutaneous every 24 hours   Quantity:  25 Syringe   Refills:  0        simethicone 80 MG chewable tablet   Commonly known as:  MYLICON   Used for:  Flatulence, eructation and gas pain        Dose:  80 mg   Take 1 tablet (80 mg) by mouth every 6 hours as needed for cramping   Quantity:  180 tablet   Refills:  0          CONTINUE these medications which have NOT CHANGED        Dose / Directions Comments    MOSES 10-40 MG Tabs   Generic drug:  " "Amlodipine-Olmesartan        Dose:  1 tablet   Take 1 tablet by mouth daily   Refills:  0        TYLENOL PO        Dose:  325-650 mg   Take 325-650 mg by mouth every 4 hours as needed for mild pain or fever   Refills:  0          STOP taking     bisacodyl 10 MG Suppository   Commonly known as:  DULCOLAX           polyethylene glycol Packet   Commonly known as:  MIRALAX/GLYCOLAX                     Further instructions from your care team       New RUQ Transverse Loop  Colostomy:   1. Pt will need teaching on Colostomy appliance change and emptying   2. Supplies with patient       1.Change barrier  2x/wk and prn   2. Empty or change closed pouch when 1/3 full of stool/gas  3. Will not harm appliance to get wet during bathing.        Blow dry (on cool setting) cloth tape and backing after bath or shower  4. Iniitally Eat 6 small meals/day. No dietary restrictions related to colostomy  5. Drink plenty of fluids  6. Always carry and emergency appliance system  7. For best results use a odor eliminator (Febreeze) in your bathroom prior to emptying/changing the appliance  8. No heavy lifting, no sit-ups but walk.  9. OK to take steps.       Supplies:  Blu New image 2pc: 20 pouch changes/month  1. Barrier: flextend, convex  cut to fit with tape                           #[id number]  5/bx = 2-4bx/month  2. Pouch: Opaque lock n roll                                                    #00299 10/bx = 1-2bx/month OR  3. Pouch: Closed opaque no  filter                                            #19402 30/bx = 1-2bx/month  4. Optional if leakage:  Adapt ring                                             #4567   10/bx = 1-2bx/month      Procedure for appliance change:  1. Draw and cut opening in barrier (If cut to fit)  following pattern, approx 2\"  2. Remove plastic backing  3. Optional if leakage:  Open ring. Stretch to approx size of opening in pouch.   4.  Optional: Place full ring around opening on sticky side of pouch. Press " "into place  5. Fold back edge of tape to create a \"tab\" This assists with paper removal in Step #11  6 Set barrier aside  7. Gently remove pouching system from around stoma. Discard.  8. Clean skin around stoma with disposable wipe or moist gauze. Discard.  9. Pat the skin dry  10. Center stoma in barrier opening. Press barrier to skin  11. Pull on \"tabs\" to remove paper that covers the tape. Press tape to skin  12. Close lock n roll closure on drainable pouch.  If using closed pouch it can be changed up to 2x/day      Call for any ?/concerns:  Mirtha Ashish FOSTER (ostomy nurses) @ [facility]  (C) [phone]  (W) [phone]                You are being discharged to a transitional care unit:  [facility] [address] [address]  Phone: [phone]        Summary of Visit     Reason for your hospital stay       Exploratory laparoscopy with loop transverse colostomy and biopsy.             After Care     Activity - Up with nursing assistance           Advance Diet as Tolerated       Follow this diet upon discharge: Low residue       Daily weights       Call Provider for weight gain of more than 2 pounds per day or 5 pounds per week.       General info for SNF       Length of Stay Estimate: Short Term Care: Estimated # of Days <30  Condition at Discharge: Improving  Level of care:skilled   Rehabilitation Potential: Good  Admission H&P remains valid and up-to-date: Yes  Recent Chemotherapy: N/A  Use Nursing Home Standing Orders: Yes       Mantoux instructions       Give two-step Mantoux (PPD) Per Facility Policy Yes             Referrals     Occupational Therapy Adult Consult       Evaluate and treat as clinically indicated.    Reason:  Deconditioning       Physical Therapy Adult Consult       Evaluate and treat as clinically indicated.    Reason:  Deconditioning             Follow-Up Appointment Instructions     Future Labs/Procedures    Follow Up and recommended labs and tests     Comments:    " Follow up in the office in 3-4 weeks with Dr. Alarcon or at your already scheduled post op visit. Call [phone] to schedule your appointment. Call the office at [phone] if you develop fever, uncontrolled pain, bleeding, nausea, vomiting, or constipation.    Follow Up and recommended labs and tests     Comments:    Follow up with Nursing home physician in 2-3 days  Follow up with primary care provider after discharge home  Follow up Surgery as recommended.      Follow-Up Appointment Instructions     Follow Up and recommended labs and tests       Follow up in the office in 3-4 weeks with Dr. Alarcon or at your already scheduled post op visit. Call [phone] to schedule your appointment. Call the office at [phone] if you develop fever, uncontrolled pain, bleeding, nausea, vomiting, or constipation.       Follow Up and recommended labs and tests       Follow up with Nursing home physician in 2-3 days  Follow up with primary care provider after discharge home  Follow up Surgery as recommended.             Statement of Approval     Ordered          12/27/17 1513  I have reviewed and agree with all the recommendations and orders detailed in this document.  EFFECTIVE NOW     Approved and electronically signed by:  Belen Richards MD

## 2017-12-21 NOTE — CONSULTS
"CLINICAL NUTRITION SERVICES  -  ASSESSMENT NOTE      Future/Additional Recommendations:   Add Ensure Plus BID btw meals when diet advanced = or > FL   Malnutrition:  Severe malnutrition  In Context of:  Acute illness or injury        REASON FOR ASSESSMENT  Mary Grace Rivers is a 89 year old female seen by Registered Dietitian for Admission Nutrition Risk Screen - Reduced oral intake over the last month    NUTRITION HISTORY  - Information obtained from patient, daughter Mili, and EPIC records.  - Patient is on a regular diet at home.  - Typical food/fluid intake is fairly good.  For the past 4 days PTA pt had N/V, abdominal distension, and constipation.  - Diet history:  Pt reports that her daughter does the shopping for her.  Pt is able to cook some items but daughter brings food on occasion as well.  Pt receives 8 meals per month from facility where she resides plus she has the ability to have more meals as needed at extra charge.   - Supplements:  None lately, but pt has had Ensure in the past and tolerates all flavors well.   - No food allergies/intolerances.  Pt has had decreased energy and conditioning with recent poor appetite.      CURRENT NUTRITION ORDERS  Diet Order:     NPO     Current Intake/Tolerance:  NPO x 2 days.  Ostomy surgery planned (ileostomy per daughter).    PHYSICAL FINDINGS  Observed  No nutrition-related physical findings observed  Obtained from Chart/Interdisciplinary Team  None noted    ANTHROPOMETRICS  Height: 4' 10\"  Admit Wt:  58.5 kg  Body mass index is 27.02 kg/(m^2).  Weight Status:  Overweight BMI 25-29.9  IBW:  43.2 kg  % IBW:  135%  Weight History:  Daughter reports pt has lost 5 lbs (4%) over the past week (131 lbs --> 126 lbs).  She states she thought her mom's admit wt was only 126 lbs, the same as what it was at her recent doctor's office visit.    Wt Readings from Last 20 Encounters:   12/21/17 61.1 kg (134 lb 9.6 oz)   12/06/17 59 kg (130 lb)   06/16/15 60.3 kg (133 lb) "       LABS  Labs reviewed    MEDICATIONS  Medications reviewed  NaCl at 100 mL/hr - for fluid delivery    Dosing Weight:  47 kg (adjusted for overwt)    ASSESSED NUTRITION NEEDS PER APPROVED PRACTICE GUIDELINES:  Estimated Energy Needs:  1808-7764 kcals (25-30 Kcal/Kg)  Justification: overweight  Estimated Protein Needs:  56-70 grams protein (1.2-1.5 g pro/Kg)  Justification: post-op  Estimated Fluid Needs:  2106-6922 mL (1 mL/Kcal)  Justification: maintenance    MALNUTRITION:  % Weight Loss:  > 2% in 1 week (severe malnutrition)  % Intake:  </= 50% for >/= 5 days (severe malnutrition)  Subcutaneous Fat Loss:  None observed  Muscle Loss:  None observed  Fluid Retention:  None noted    Malnutrition Diagnosis: Severe malnutrition  In Context of:  Acute illness or injury    NUTRITION DIAGNOSIS:  Inadequate protein-energy intake related to altered GI function as evidenced by limited nutritional intake over past week with resultant 4% weight loss.      NUTRITION INTERVENTIONS  Recommendations / Nutrition Prescription  ADAT after surgery per MD discretion  Add Ensure Plus btw meals when diet advanced = or > FL    Implementation  Nutrition education: Not appropriate at this time due to patient condition    Nutrition Goals  Diet will be advanced in the next 3-5 days and pt will consume > 50% meals.      MONITORING AND EVALUATION:  Progress towards goals will be monitored and evaluated per protocol and Practice Guidelines    Cherelle Martinez, RD, LD, CNSC

## 2017-12-21 NOTE — PLAN OF CARE
"Problem: Patient Care Overview  Goal: Plan of Care/Patient Progress Review  Pt A&O, disoriented to place in aM, [place]. VSS on 1LPM. CMS intact. +BS, 0 flatus. Denies pain. IVF. Echo, EKG done. Colostomy placement surgery this afternoon.  Up SBA, walker, gb. Ambulated in [place] x1. NPO, ice chips. Pt c/o \"I'm so thirsty.\" oral cares done by pt and pt's family.    recommend page Dr. Carter when echo results post.       "

## 2017-12-21 NOTE — PROGRESS NOTES
Colorectal Surgery Progress Note  POD#1      Subjective:    Passing gas  Not passing bm    Vitals:  Vitals:    12/20/17 2243 12/20/17 2300 12/21/17 0019 12/21/17 0646   BP:   100/46    BP Location:   Right arm    Pulse:       Resp: 16 18 16    Temp:   98.7  F (37.1  C)    TempSrc:   Oral    SpO2:   93%    Weight:    61.1 kg (134 lb 9.6 oz)   Height:         I/O:       Physical Exam:  Gen: AAOx3, NAD  Pulm: Non-labored breathing  Abd: Soft,midly tender, slightly distended  Ext:  Warm and well-perfused    BMP  Recent Labs  Lab 12/21/17  0611 12/20/17  0940    136   POTASSIUM 3.7 4.2   CHLORIDE 114* 106   CO2 21 22   BUN 15 18   CR 0.68 0.89   GLC 75 120*     CBC  Recent Labs  Lab 12/21/17  0611 12/20/17  0940   WBC 9.3 14.8*   HGB 9.3* 11.5*   HCT 31.0* 36.8    335         ASSESSMENT: This is a 89 year old female presents with colon obstruction   Lesion at splenic flexure  Also lesion noted in cecum    Will have ostomy nurses pass by today for marking  Will have discussion with family regarding plan as patient did not have a full understanding of what an ostomy entails.           Ted Wang MD  Colorectal Surgery fellow  [phone]      Colon and Rectal Surgery Attending Note    Patient seen and examined independently.  Agree with above assessment and plan.  This is an 89 year old woman with obstructing sigmoid lesion and a right colon lesion. There are also several concerning liver lesion.   She has had several days of pain, + flatus, no BM.   abd softly distended.   We options for palliative G tube and hospice, vs diverting ileostomy, vs subtotal resection and anastomosis or ileostomy.  We talked about the risks of bleeding infection, leak if anastomosis was created, heart attack, stroke, blood clots, damage to surrounding structure. We talked about possible dehydration with an ileostomy.     They understand and wish to proceed.    Olivia Alarcon MD  [facility]  Office  Phone # [phone]

## 2017-12-21 NOTE — PLAN OF CARE
Problem: Patient Care Overview  Goal: Plan of Care/Patient Progress Review  Outcome: No Change  Patient is disoriented to place sometimes, [place], VSS on RA, no complaint of pain, NPO, up with assist of 1 to the bathroom, IV infusing, and abdominal distended, but soft. Will continue to monitor

## 2017-12-21 NOTE — PLAN OF CARE
Problem: Patient Care Overview  Goal: Plan of Care/Patient Progress Review  Outcome: No Change  VSS, patient reports no pain this evening. We were unable to insert NG. A + O x 2 or 3. Surgery consult in the morning for obstructed bowel believed to be a cancerous mass per colo-rectal.

## 2017-12-21 NOTE — PROGRESS NOTES
[facility]    Hospitalist Progress Note  Ryan Carter MD    Assessment & Plan     89 year old female, with PmHx of depression, hypertension, UTI, colon polyp and history of   breast cancer, who presented today with symptoms of abdominal distension, nausea and   vomiting post meals and constipation for 4 days. Workup done on 12/20/17 revealed, CMP significant for Alb 2.8. CBC revealed, Hb 11.5, WBC 14.8 and Plts 335. INR was 1.02. Lipase was normal. EKG on 12/20/17 revealed NSR rate 81/min. CT abd/pelvis on 12/20/17 revealed, colonic obstruction at the splenic flexure. An underlying mass lesion cannot be excluded. Sigmoid/descending colonic diverticulosis, without CT evidence of diverticulitis. Hepatic several small hypodensities, which due to their ill-defined nature are indeterminate and could represent small hepatic metastasis. Left lower lobe pulmonary nonspecific 0.4 cm nodule on the uppermost image. Bilateral renal cysts, two of which appear complex. Chest x-rays on 12/20/17 was normal.      1. Colonic obstruction: due to mass at splenic flexure and mass in the ascending colon, likely due to malignancy. Appreciate [place]-rectal Surgery input. Keep NPO. For IV N/Saline. For IV antiemetics prn. For IV dilaudid prn. The patient and her daughter met with the stoma nurse today. The pt is willing to proceed with surgery today. EKG done on 12/21/17 revealed, NSR rate 93/min, with occasional PVCs. The patient is intermediate risk for surgery based on her age. Patient has a soft ESM over the precordium. I will like to assess her underlying cardiac function with a stat ECHO today, as she recently move from [place] in Oct 2017 and there is no ECHO result in her medical records.      2. Hypertension: controlled. Will hold Amlodipine-Olmesartan. For IV hydralazine prn.     3. GERD prophylaxis: for IV protonix 40mg qd.       DVT Prophylaxis: Pneumatic Compression Devices  Code Status: Full  Code    Disposition: Expected discharge, pending clinical course.      Interval History   The pt reported feeling thirsty. She denied any complaints of chest pain/sob/palpitations. She has pain on palpation of left lower abdominal quadrant and RUQ area.     -Data reviewed today: I reviewed all new labs and imaging results over the last 24 hours. I personally reviewed the EKG tracing showing NSR, rate 93/min, with PVCs..    Physical Exam   Temp: 97.8  F (36.6  C) Temp src: Oral BP: 123/60   Heart Rate: 91 Resp: 16 SpO2: 91 % O2 Device: Nasal cannula Oxygen Delivery: 1 LPM  Vitals:    12/20/17 0929 12/21/17 0646   Weight: 58.5 kg (129 lb) 61.1 kg (134 lb 9.6 oz)     Vital Signs with Ranges  Temp:  [97.8  F (36.6  C)-98.7  F (37.1  C)] 97.8  F (36.6  C)  Heart Rate:  [78-91] 91  Resp:  [16-18] 16  BP: (100-123)/(42-60) 123/60  SpO2:  [91 %-94 %] 91 %       Constitutional: Elderly white female, awake, cooperative, no apparent distress, O2 Sats 93% on 1L via NC  Respiratory: BS vesicular bilaterally, no crackles or wheezing  Cardiovascular: S1 and S2, reg, with ESM murmur noted over the precordium  GI: Soft, non-distended, tenderness++ on palpation of RUQ and left lower quadrant area , no palpable masses,   BS present+  Skin/Integumen: No rashes  Extremities: No pedal edema    Medications     NaCl 75 mL/hr at 12/21/17 1250       sodium chloride (PF)  3 mL Intracatheter Q8H     pantoprazole  40 mg Intravenous QAM AC       Data     Recent Labs  Lab 12/21/17  0611 12/20/17  0940   WBC 9.3 14.8*   HGB 9.3* 11.5*   MCV 76* 74*    335   INR  --  1.02    136   POTASSIUM 3.7 4.2   CHLORIDE 114* 106   CO2 21 22   BUN 15 18   CR 0.68 0.89   ANIONGAP 8 8   LUPE 8.0* 9.4   GLC 75 120*   ALBUMIN  --  2.8*   PROTTOTAL  --  7.0   BILITOTAL  --  0.8   ALKPHOS  --  81   ALT  --  12   AST  --  16   LIPASE  --  233       No results found for this or any previous visit (from the past 24 hour(s)).

## 2017-12-21 NOTE — PROGRESS NOTES
"[facility] Nurse Ostomy Marking and Education         Data:   History:       This is a 89 year old female presents with colon obstruction   Lesion at splenic flexure  Also lesion noted in cecum   Daughter states she is a minimal eater. Lives independently. Very [place]. Does read some lips.      Pt referred by Dr Alarcon  Who is present for marking and education: Possible ileostomy vs colostomy. Per daughter probably Colostomy  Type of ostomy surgery:  See above  Abdomen:  Pt is 4'8\".  States her abdomen if normally flat. Abdomen is distended and semi-firm.  Beltline well above umbilicus.  Old scarring noted but not in area of marking.            Intervention:     Patient's chart evaluated.      Assessments done today:  Belt line, previous abdominal surgeries and scars, abdominal muscles and pt observed lying, sitting and standing    Education: Reviewed upcoming surgery, stoma construction, post-op expectations, general ostomy cares/concerns including: differenced between colostomy/ileostomy/ urostomy, diet and hydration, bathing, odor, output,  activity, appliances and emptying.  Pt very overwhelmed with information.  Discussed palliative G-tube placement vs surgery.  This was brought up by daughter. Discussed general care of G-tube including connection to a collection Type bag.      Patient marked with \"x\" using surgical marker and scratch method then allowed to dry 3 minutes and sealed with 3M No Sting Skin Prep. Pt marked distal  LUQ for colostomy and distal RUQ for ileosotmy.   Each site allows for adequate visualization and surrounding pouching surface.           Assessment:       Learning needs/ comprehension: Pt and daughter discussing options          Plan:     Plan:            Written information and sample pouches given to daughter and patient  ? Surgery scheduled for:  *Possible later today per daughter.     Will plan to follow patient post operatively.      EDU stat: 30 minutes      "

## 2017-12-21 NOTE — PROGRESS NOTES
"SPIRITUAL HEALTH SERVICES  SPIRITUAL ASSESSMENT Progress Note  [facility]    PRIMARY FOCUS:     Goals of care    Support for coping    ILLNESS CIRCUMSTANCES:   Reviewed documentation. Reflective conversation shared with pt, her daughter, Mili, and Mili's  which integrated elements of illness and family narratives. SH visited on request. Pt said she was experiencing pain due to her abdominal blockage and was looking forward to surgery today. Pt shared some life events including raising her 5 children, 2 sons being music teachers as teens and her  being crowned \"[place]\" of their town. SH provided reflective listening, support and encouragement. Pt and family welcomed prayer, which pt led..    Context of Serious Illness/Symptom(s) - Pt recently moved from [place], where she lived with one son, to a senior housing in [place] to be closer to the rest of the family. Pt has complex medical history but still lives independently at age 89.    Resources for Support - Family, britney    DISTRESS:     Emotional/Existential/Relational Distress - None noted    Spiritual/Oriental orthodox Distress - None noted    Social/Cultural/Economic Distress - None noted    SPIRITUAL/Congregation COPING:     Baptist/Britney - Pt is Confucianism and says she has many people praying for her.    Spiritual Practice(s) - Prayer    Emotional/Existential/Relational Connections - family, friends    GOALS OF CARE:    Goals of Care - Pt is looking forward to being able to go home after surgery    Meaning/Sense-Making - Pt says, \"I'll be OK\"    PLAN:  will follow up in the next few days.      Rashid Ruiz M.Div.  Chaplain Resident  [phone] Pager  "

## 2017-12-21 NOTE — PROGRESS NOTES
Three attempts were made this evening to insert an NG tube and the help of the [employer] was enlisted to no avail. Spoke with Dr Stevenson who had already been briefed on the situation and he indicated that we should not attempt any more. He indicated that surgery will decide in the morning a course of action. He said to call him if the patient had S & S including N & V, severe pain, excessive distention.

## 2017-12-22 PROBLEM — C18.9 COLON CANCER (H): Status: ACTIVE | Noted: 2017-01-01

## 2017-12-22 NOTE — PROGRESS NOTES
12/22/17 1248   Quick Adds   Type of Visit Initial PT Evaluation   Living Environment   Lives With alone   Living Arrangements apartment   Home Accessibility no concerns   Living Environment Comment senior independent apartment. Has a bench in shower, some meals provided by apartment.   Self-Care   Usual Activity Tolerance good   Current Activity Tolerance fair   Regular Exercise yes   Activity/Exercise Type strength training   Exercise Amount/Frequency 5-7 times/wk   Equipment Currently Used at Home walker, rolling   Activity/Exercise/Self-Care Comment independent with ADLs using walker most of the time, family brings food and checks on pt daily. bathes and dresses self,   Functional Level Prior   Ambulation 1-->assistive equipment   Transferring 0-->independent   Toileting 0-->independent   Bathing 0-->independent   Dressing 0-->independent   Eating 0-->independent   Communication 0-->understands/communicates without difficulty   Swallowing 0-->swallows foods/liquids without difficulty   Cognition 0 - no cognition issues reported   Fall history within last six months yes   Number of times patient has fallen within last six months 2   Which of the above functional risks had a recent onset or change? ambulation   General Information   Onset of Illness/Injury or Date of Surgery - Date 12/21/17   Referring Physician Dr. Alarcon   Patient/Family Goals Statement To go home   Pertinent History of Current Problem (include personal factors and/or comorbidities that impact the POC) Pt is a 89 year old female POD#1 lap transverse colostomy due to colon mass.    Precautions/Limitations fall precautions;abdominal precautions;oxygen therapy device and L/min   Weight-Bearing Status - LLE full weight-bearing   Weight-Bearing Status - RLE full weight-bearing   Cognitive Status Examination   Orientation person   Level of Consciousness alert   Follows Commands and Answers Questions 100% of the time   Personal Safety and Judgment  intact   Memory intact   Cognitive Comment mild deficits not knowing exact name of hospital and day but otherwise answered appropriately   Pain Assessment   Patient Currently in Pain Yes, see Vital Sign flowsheet   Integumentary/Edema   Integumentary/Edema Comments Pt has stoma   Posture    Posture Comments forward shoulders and posture   Range of Motion (ROM)   ROM Comment extremities WFL, limitations in trunk consistent with surgery   Strength   Strength Comments Pt demonstrated antigravity strength in all extremities   Bed Mobility   Bed Mobility Comments rolling modA of 2 and cues, sitting EOB modA of 2   Transfer Skills   Transfer Comments sit to stand: Chen of 2 to assist and mange lines   Gait   Gait Comments Pt took a couple of steps along bedside Chen of 2   Balance   Balance Comments requires support   Sensory Examination   Sensory Perception no deficits were identified   Coordination   Coordination no deficits were identified   Muscle Tone   Muscle Tone no deficits were identified   General Therapy Interventions   Planned Therapy Interventions balance training;bed mobility training;gait training;strengthening;transfer training;progressive activity/exercise   Clinical Impression   Criteria for Skilled Therapeutic Intervention yes, treatment indicated   PT Diagnosis difficulty with transfers   Influenced by the following impairments abdominal precautions, pain, decreased endurance and need for supplemental O2, decreased tolerance for activity   Functional limitations due to impairments increased assist for all funcitonal mobility compared to baseline   Clinical Presentation Evolving/Changing   Clinical Presentation Rationale clinical judgement   Clinical Decision Making (Complexity) Moderate complexity   Therapy Frequency` daily   Predicted Duration of Therapy Intervention (days/wks) 5-7 days   Anticipated Discharge Disposition Transitional Care Facility   Risk & Benefits of therapy have been explained Yes  "  Patient, Family & other staff in agreement with plan of care Yes   [facility] AM-PAC TM \"6 Clicks\"   2016, Trustees of [facility], under license to [employer].  All rights reserved.   6 Clicks Short Forms Basic Mobility Inpatient Short Form   [facility] AM-PAC  \"6 Clicks\" V.2 Basic Mobility Inpatient Short Form   1. Turning from your back to your side while in a flat bed without using bedrails? 2 - A Lot   2. Moving from lying on your back to sitting on the side of a flat bed without using bedrails? 2 - A Lot   3. Moving to and from a bed to a chair (including a wheelchair)? 2 - A Lot   4. Standing up from a chair using your arms (e.g., wheelchair, or bedside chair)? 2 - A Lot   5. To walk in hospital room? 2 - A Lot   6. Climbing 3-5 steps with a railing? 2 - A Lot   Basic Mobility Raw Score (Score out of 24.Lower scores equate to lower levels of function) 12   Total Evaluation Time   Total Evaluation Time (Minutes) 15     "

## 2017-12-22 NOTE — PROGRESS NOTES
Called to assess pt for increased O2 needs. Pt on 10 lpm simple mask, SpO2 90%. Switched pt to oxymask 12 LPM with mild improvement, SpO2 91-92%. Placed pt on HFNC 35L 70% FiO2. SpO2 96%. Pt taking adequate breaths, no SOB.  BBS clear. Bipap and neb orders received, pt with NG tube in place, will place pt on Bipap if needed. HFNC for now, will titrate FiO2 as able. RT will continue to follow. RN notified and in agreement.    Marlon Mullen RT

## 2017-12-22 NOTE — ANESTHESIA PREPROCEDURE EVALUATION
Anesthesia Evaluation     . Pt has had prior anesthetic.     No history of anesthetic complications          ROS/MED HX    ENT/Pulmonary:      (-) sleep apnea   Neurologic:       Cardiovascular:     (+) hypertension----. : . . . :. .       METS/Exercise Tolerance:     Hematologic:         Musculoskeletal:         GI/Hepatic: Comment: Large bowel obstruction 2/2 mass    (+) Other GI/Hepatic      (-) GERD   Renal/Genitourinary:         Endo:         Psychiatric:         Infectious Disease:         Malignancy:         Other:                                    Anesthesia Plan      History & Physical Review  History and physical reviewed and following examination; no interval change.    ASA Status:  3 .    NPO Status:  > 8 hours    Plan for General, RSI and ETT with Intravenous induction. Maintenance will be Balanced.    PONV prophylaxis:  Ondansetron (or other 5HT-3) and Dexamethasone or Solumedrol       Postoperative Care  Postoperative pain management:  IV analgesics and Oral pain medications.      Consents  Anesthetic plan, risks, benefits and alternatives discussed with:  Patient..                      Procedure: Procedure(s):  LAPAROSCOPIC ASSISTED COLOSTOMY  Preop diagnosis: LARGE BOWEL OBSTRUCTION    Allergies   Allergen Reactions     Penicillins      Past Medical History:   Diagnosis Date     Cancer (H)     breast     Depressive disorder      Hypertension      Past Surgical History:   Procedure Laterality Date     BREAST SURGERY       CHOLECYSTECTOMY       GYN SURGERY       ORTHOPEDIC SURGERY       Social History   Substance Use Topics     Smoking status: Former Smoker     Smokeless tobacco: Never Used     Alcohol use No     Prior to Admission medications    Medication Sig Start Date End Date Taking? Authorizing Provider   Amlodipine-Olmesartan (MOSES) 10-40 MG TABS Take 1 tablet by mouth daily   Yes Unknown, Entered By History   polyethylene glycol (MIRALAX/GLYCOLAX) Packet Take 17 g by mouth daily   Yes  Unknown, Entered By History   bisacodyl (DULCOLAX) 10 MG Suppository Place 10 mg rectally daily as needed for constipation   Yes Unknown, Entered By History   Acetaminophen (TYLENOL PO) Take 325-650 mg by mouth every 4 hours as needed for mild pain or fever   Yes Unknown, Entered By History     Current Facility-Administered Medications Ordered in Epic   Medication Dose Route Frequency Last Rate Last Dose     lidocaine 1 % 1 mL  1 mL Other Q1H PRN         sodium chloride (PF) 0.9% PF flush 3 mL  3 mL Intracatheter Q8H         lactated ringers infusion   Intravenous Continuous         morphine (PF) injection 4 mg  4 mg Intravenous Q15 Min PRN   4 mg at 12/20/17 2243     lidocaine 1 % 1 mL  1 mL Other Q1H PRN         lidocaine (LMX4) cream   Topical Q1H PRN         sodium chloride (PF) 0.9% PF flush 3 mL  3 mL Intracatheter Q1H PRN         sodium chloride (PF) 0.9% PF flush 3 mL  3 mL Intracatheter Q8H   3 mL at 12/20/17 1719     potassium chloride SA (K-DUR/KLOR-CON M) CR tablet 20-40 mEq  20-40 mEq Oral Q2H PRN         potassium chloride (KLOR-CON) Packet 20-40 mEq  20-40 mEq Oral or Feeding Tube Q2H PRN         potassium chloride 10 mEq in 100 mL intermittent infusion with 10 mg lidocaine  10 mEq Intravenous Q1H PRN         potassium chloride 20 mEq in 50 mL intermittent infusion  20 mEq Intravenous Q1H PRN         acetaminophen (TYLENOL) tablet 650 mg  650 mg Oral Q4H PRN         naloxone (NARCAN) injection 0.1-0.4 mg  0.1-0.4 mg Intravenous Q2 Min PRN         melatonin tablet 1 mg  1 mg Oral At Bedtime PRN         polyethylene glycol (MIRALAX/GLYCOLAX) Packet 17 g  17 g Oral Daily PRN         bisacodyl (DULCOLAX) Suppository 10 mg  10 mg Rectal Daily PRN         ondansetron (ZOFRAN-ODT) ODT tab 4 mg  4 mg Oral Q6H PRN        Or     ondansetron (ZOFRAN) injection 4 mg  4 mg Intravenous Q6H PRN         0.9% sodium chloride infusion   Intravenous Continuous 75 mL/hr at 12/21/17 1250       acetaminophen (TYLENOL)  Suppository 650 mg  650 mg Rectal Q4H PRN         HYDROmorphone (PF) (DILAUDID) injection 0.3-0.5 mg  0.3-0.5 mg Intravenous Q2H PRN         prochlorperazine (COMPAZINE) injection 5 mg  5 mg Intravenous Q6H PRN        Or     prochlorperazine (COMPAZINE) tablet 5 mg  5 mg Oral Q6H PRN        Or     prochlorperazine (COMPAZINE) Suppository 12.5 mg  12.5 mg Rectal Q12H PRN         hydrALAZINE (APRESOLINE) injection 10 mg  10 mg Intravenous Q4H PRN         pantoprazole (PROTONIX) 40 mg IV push injection  40 mg Intravenous QAM AC   40 mg at 12/21/17 0620     sodium chloride (OCEAN) 0.65 % nasal spray 1-2 spray  1-2 spray Nasal Q1H PRN         No current Epic-ordered outpatient prescriptions on file.       lactated ringers       NaCl 75 mL/hr at 12/21/17 1250     Wt Readings from Last 1 Encounters:   12/21/17 61.1 kg (134 lb 9.6 oz)     Temp Readings from Last 1 Encounters:   12/21/17 36.7  C (98.1  F) (Oral)     BP Readings from Last 6 Encounters:   12/21/17 131/68   12/06/17 131/68   06/17/15 137/62     Pulse Readings from Last 4 Encounters:   12/21/17 98   12/06/17 86   06/17/15 76     Resp Readings from Last 1 Encounters:   12/21/17 18     SpO2 Readings from Last 1 Encounters:   12/21/17 91%     Recent Labs   Lab Test  12/21/17   0611  12/20/17   0940   NA  143  136   POTASSIUM  3.7  4.2   CHLORIDE  114*  106   CO2  21  22   ANIONGAP  8  8   GLC  75  120*   BUN  15  18   CR  0.68  0.89   LUPE  8.0*  9.4     Recent Labs   Lab Test  12/20/17   0940  12/06/17   0350   AST  16  10   ALT  12  15   ALKPHOS  81  90   BILITOTAL  0.8  0.2   LIPASE  233  272     Recent Labs   Lab Test  12/21/17   0611  12/20/17   0940   WBC  9.3  14.8*   HGB  9.3*  11.5*   PLT  257  335     No results for input(s): ABO, RH in the last 81338 hours.  Recent Labs   Lab Test  12/20/17   0940   INR  1.02      Recent Labs   Lab Test  12/06/17   0550  12/06/17   0350   TROPI  0.026  0.021     No results for input(s): PH, PCO2, PO2, HCO3 in the last  54896 hours.  No results for input(s): HCG in the last 16257 hours.  Recent Results (from the past 744 hour(s))   Head CT w/o contrast    Narrative    CT SCAN OF THE HEAD WITHOUT CONTRAST  12/6/2017 4:13 AM     HISTORY: Fall, confusion.     TECHNIQUE: Axial images of the head and coronal reformations without  IV contrast material. Radiation dose for this scan was reduced using  automated exposure control, adjustment of the mA and/or kV according  to patient size, or iterative reconstruction technique.    COMPARISON: None.    FINDINGS: There is generalized atrophy of the brain. There is low  attenuation in the white matter of the cerebral hemispheres consistent  with sequelae of small vessel ischemic disease. There is no evidence  of intracranial hemorrhage, mass, acute infarct or anomaly.     Mild ethmoid and maxillary sinus disease. There is no evidence of  trauma.      Impression    IMPRESSION: No acute abnormality.    ADILSON BELTRÁN MD   Chest XR,  PA & LAT    Narrative    XR CHEST 2 VW  12/6/2017 4:38 AM      HISTORY: Upper abdominal pain.     COMPARISON: 12/7/2009.    FINDINGS: The heart is at the upper limits of normal in size without  pulmonary edema. The thoracic aorta is calcified and mildly tortuous.  The lungs are clear. No pneumothorax or pleural effusion. Surgical  clips in the left breast.      Impression    IMPRESSION: No acute abnormality.    ADILSON BELTRÁN MD   CT Abdomen Pelvis w Contrast    Addendum: 12/20/2017    KINJAL REYES  ACCESSION #[id number]    ADDENDUM: In addition to the findings below, there is a 4.0 cm mass in  the ascending colon on the right best seen on coronal series 4 image  20. I spoke with the PA taking care of this patient at 1720 regarding  these findings.    ROSA COLLINS M.D.  Date of Addendum: 12/20/2017    DASHA ARCE MD      Narrative    CT ABDOMEN/PELVIS WITH CONTRAST December 20, 2017 10:42 AM     HISTORY: Distended tender abdomen with vomiting.     CONTRAST DOSE:  78 mL Isovue-[id number]    TECHNIQUE: Radiation dose for this scan was reduced using automated  exposure control, adjustment of the mA and/or kV according to patient  size, or iterative reconstruction technique.    FINDINGS: Calcified granulomas are noted at the lung bases. However,  the upper most image, there is a nonspecific 0.4 cm left lower lobe  pulmonary nodule. Multiple hepatic hypodensities are noted. Several  these are ill-defined and therefore indeterminate. Intrahepatic  biliary prominence is noted which may be related to  postcholecystectomy state. Multiple bilateral renal cysts are noted.  Thin rim of calcification is noted within a right superior pole cyst.  Hyperdense cyst is noted in the left renal lower pole. A  nonobstructing stone is also noted within the left mid pole. There is  marked fluid dilatation of the colon extending from the cecum to the  splenic flexure suggesting colonic obstruction. Descending and sigmoid  colon are decompressed. No free peritoneal fluid or air is  demonstrated. Sigmoid diverticulosis is noted without adjacent  inflammatory stranding to clearly indicate diverticulitis.      Impression    IMPRESSION:  1. Colonic obstruction at the splenic flexure. An underlying mass  lesion cannot be excluded.  2. Sigmoid/descending colonic diverticulosis without CT evidence of  diverticulitis.  3. Hepatic several small hypodensities which due to their ill-defined  nature are indeterminate and could represent small hepatic metastasis.  4. Left lower lobe pulmonary nonspecific 0.4 cm nodule on the  uppermost image.  5. Bilateral renal cysts, two of which appear complex.    ROSA COLLINS MD   XR Chest 2 Views    Narrative    CHEST TWO VIEWS  12/20/2017 10:52 AM    HISTORY:  Chest and abdominal pain.    COMPARISON:  12/6/2017      Impression    IMPRESSION:  No infiltrates or acute process. Heart and pulmonary  vasculature within normal limits. Aortic calcification. Surgical clips  project over  the lower lateral left hemithorax.     JAZZMINE BELTRAN MD       RECENT LABS:   ECG:   ECHO:

## 2017-12-22 NOTE — BRIEF OP NOTE
[facility]    Brief Operative Note    Pre-operative diagnosis: LARGE BOWEL OBSTRUCTION  Post-operative diagnosis Peritoneal Carcinomatosis  Procedure: Procedure(s):  EXPLORATORY LAPAROSCOPY WITH LOOP TRANSVERSE COLOSTOMY WITH BIOPSY - Wound Class: II-Clean Contaminated  Surgeon: Surgeon(s) and Role:     * Olivia Alarcon MD - Primary     * Refugio Arana MD   Anesthesia: General   Estimated blood loss: Less than 10 ml  Drains: None  Specimens:   ID Type Source Tests Collected by Time Destination   A : LEFT UPPER QUADRANT IMPLANT Tissue Other SURGICAL PATHOLOGY EXAM Olivia Alarcon MD 12/21/2017  9:38 PM      Findings:   Multiple abdominal nodules and liver lesions, slightly bloody ascites; distended cecum, decompressed small bowel  Complications: None.  Implants: None.    Condition on discharge from OR: Satisfactory    Refugio Arana MD   [facility]   [phone].        ADDENDUM:    PATIENT DATA  Indicate Y or N:  Home O2 No  Hemodialysis  No  Transplant patient  No  Cirrhosis  No  Steroids in last 30 days  No  Immunomodulators in last 30 days  No  Anticoagulation at time of surgery  No   List medication n/a  Prior abdominal surgery  Yes  Pelvic irradiation  No    Albumin within 30 days if known 2.8   Hgb within 30 days if known 9.3   Hemoglobin   Date Value Ref Range Status   12/21/2017 9.3 (L) 11.7 - 15.7 g/dL Final   ]  Cr within 30 days if known 0.68   Creatinine   Date Value Ref Range Status   12/21/2017 0.68 0.52 - 1.04 mg/dL Final   ]  Body mass index is 28.13 kg/(m^2).      OR DATA  Emergent  Yes   <24 hours  Yes   <1 week  No  Bowel Prep No  Antibiotics  Yes  DVT prophylaxis    Heparin  Yes   SCD  Yes   None  No  Drain  No  ASA (1,2,3,4) 3  OR time (min) 84min  Stents  No  Transfuse >/= 2U  No  Anastomosis   Stapled  No   Handsewn  No  Leak Test    Positive  No   Negative  No   Not done  No    FOR CANCER ALSO COMPLETE:  Preoperative treatment (Y  or N)   Chemo  No   Radiation No   Diversion  No  Metastatic disease at time of operation  Yes

## 2017-12-22 NOTE — PLAN OF CARE
Problem: Patient Care Overview  Goal: Plan of Care/Patient Progress Review  PT: Orders received. Chart reviewed. Evaluation completed and treatment initiated. Pt is a 89 year old female POD#1 lap transverse loop colostomy for a colon mass. Pt lives alone in a senior apartment and family checks in on pt. At baseline, pt is independent with her walker.   Discharge Planner PT   Patient plan for discharge: Pt wants to go home  Current status: Pt requires Chen of 2 to roll and modA of 2 to follow abdominal precautions and manage lines to sit and return from EOB. Pt stood with Chen of 2 and took some steps along bedside.   Barriers to return to prior living situation: Lives alone in independent living  Recommendations for discharge: TCU  Rationale for recommendations: Pt has good potential to return to more independent mobility with skilled care but current level of assist is below baseline.       Entered by: Justa Peraza 12/22/2017 3:53 PM

## 2017-12-22 NOTE — PROGRESS NOTES
[facility] Nurse Inpatient Ostomy Assessment      Initial Assessment of ostomy and needs for:  Colostomy      Data:   History:   Per MD note(s):   Mary Grace Rivers is a 89-year-old woman who was admitted to the hospital yesterday with nausea, vomiting and abdominal pain.  She had been on antibiotics for urinary tract infection, but had progressive discomfort prompting her to be seen in the emergency room.  CT scan demonstrated dilated ascending and transverse colon with some dilatation of the small bowel and what appeared to be obstructing mass at the distal transverse colon.  There was an additional mass noted in your right colon that did not appear to be obstructing.  There were several indeterminate lesions in the liver concerning for possible metastasis.  She was passing a very small amount of gas, but no bowel movement for several days and had progressive abdominal pain.  For these reasons, recommended an exploratory laparoscopy with possible subtotal colectomy versus ileostomy or colostomy.  She in conjunction with daughter, Mili, also had a chance to go over the risks including bleeding and infection, both superficial and deep, possible anastomotic leak if one were created and other risks associated with major abdominal surgery and general anesthesia including but not limited to DVT, PE, heart attack, stroke and urinary tract infection, blood clots, damage to surrounding structures and even death.  We discussed that there might be more lesions or more advanced disease.  She was briefed on the need for stoma.  She understood and wished to proceed.      Type of ostomy:  Colostomy  Stoma assessment:   ? 1 3/4 slight oval, viable, beefy red, moist, edematous and protruberant, Red Rubber to keep patent  Mucocutaneous Junction (MCJ):  intact  Peristomal skin:  intact  Abdominal assessment: firm and distended  Output:  moderate, thin and liquid, brown   I/O last 3 completed shifts:  In: 8246.29  [I.V.:2770.83]  Out: 240 [Urine:230; Blood:10]  Current pouching system:  [place] NI 57mm with barrier ring,  two piece   Pain:  Pt has anticipatory pain, does well with guidance. Pain medication administered prior to pouch change    Diet:             Active Diet Order      NPO for Medical/Clinical Reasons Except for: Meds, Ice Chips  Labs:   Recent Labs   Lab Test  12/22/17   0729  12/21/17   0611  12/20/17   0940   ALBUMIN   --    --   2.8*   HGB  8.8*  9.3*  11.5*   INR   --    --   1.02   WBC   --   9.3  14.8*           Intervention:     Patient's chart evaluated.      Assessments done today:  Procedure, Abdomen, stoma, pouching    Education:Pouch Emptying and Pouch Replacement, Use of clamp, Use of tape, How to empty pouch, Removal of pouch, Preparation of new pouch, Cutting out or evaluating a pattern, Applying paste or rings, Applying appliance to abdomen, Peristomal skin care, Diet and hydration / fluid balance and Odor / flatus management     Prepared for discharge by: No discharge preparations started    Pt registered for ostomy supply samples? No: upon discharge         Assessment:     Stoma assessment: viable, healthy, beefy red, moist, edematous and protruberant with red rubber catheter to keep patent    Learning needs/ comprehension: Pouch Emptying and Pouch Replacement, Use of clamp, Use of tape, How to empty pouch, Removal of pouch, Preparation of new pouch, Cutting out or evaluating a pattern, Applying paste or rings, Applying appliance to abdomen, Peristomal skin care, Diet and hydration / fluid balance and Odor / flatus management     Effectiveness of current pouching/ supply plan: Initiated high output pouch today         Plan:     Plan:   ? Current pouching system: [place] NI High output with Barrier ring    Education:  ? Education completed:  Dtkailee Garces present for teaching. Prior to surgery pt was living independently, dtr verbalizes she will help pt and is willing to start performing care  while pt is in hospital. WOC performed and educated today on Pouch Emptying and Pouch Replacement, Use of clamp, Use of tape, How to empty pouch, Removal of pouch, Preparation of new pouch, Cutting out or evaluating a pattern, Applying paste or rings, Applying appliance to abdomen, Peristomal skin care, Diet and hydration / fluid balance and Odor / flatus management   ? Educational needs: Pouch Emptying and Pouch Replacement, Use of clamp, Use of tape, How to empty pouch, Removal of pouch, Preparation of new pouch, Cutting out or evaluating a pattern, Applying paste or rings, Applying appliance to abdomen, Peristomal skin care, Diet and hydration / fluid balance and Odor / flatus management, Lifestyle adjustment changes  ? Continue to prepare for discharge: No discharge preparations started  o Supply company: none   o Do [facility] Nurses recommend home care? Yes     Face to face time: 45 Minutes

## 2017-12-22 NOTE — ANESTHESIA POSTPROCEDURE EVALUATION
Patient: Mary Grace Rivers    Procedure(s):  EXPLORATORY LAPAROSCOPY WITH LOOP TRANSVERSE COLOSTOMY WITH BIOPSY - Wound Class: II-Clean Contaminated    Diagnosis:LARGE BOWEL OBSTRUCTION  Diagnosis Additional Information: No value filed.    Anesthesia Type:  General, RSI, ETT    Note:  Anesthesia Post Evaluation    Patient location during evaluation: PACU  Patient participation: Able to fully participate in evaluation  Level of consciousness: awake  Pain management: adequate  Airway patency: patent  Cardiovascular status: acceptable  Respiratory status: acceptable  Hydration status: acceptable  PONV: none     Anesthetic complications: None          Last vitals:  Vitals:    12/22/17 0318 12/22/17 0406 12/22/17 0541   BP: 111/51  134/55   Pulse: 82  82   Resp: 16 17 16   Temp: 36.5  C (97.7  F)  36.2  C (97.2  F)   SpO2: 98% 95% 95%         Electronically Signed By: Brenna King MD, MD  December 22, 2017  6:02 AM

## 2017-12-22 NOTE — ANESTHESIA CARE TRANSFER NOTE
Patient: Mary Grace Rivers    Procedure(s):  EXPLORATORY LAPAROSCOPY WITH LOOP TRANSVERSE COLOSTOMY WITH BIOPSY - Wound Class: II-Clean Contaminated    Diagnosis: LARGE BOWEL OBSTRUCTION  Diagnosis Additional Information: No value filed.    Anesthesia Type:   General, RSI, ETT     Note:  Airway :Face Mask  Patient transferred to:PACU  Comments: Patient breathing spontaneously.  Follows commands.  Suctioned and extubated.  Exchanging air well.  Transferred to PACU with 10L O2 via mask.  Monitors on.  VSS.  Patent IV.  Report and transfer of care to RN.  Handoff Report: Identifed the Patient, Identified the Reponsible Provider, Reviewed the pertinent medical history, Discussed the surgical course, Reviewed Intra-OP anesthesia mangement and issues during anesthesia, Set expectations for post-procedure period and Allowed opportunity for questions and acknowledgement of understanding      Vitals: (Last set prior to Anesthesia Care Transfer)    CRNA VITALS  12/21/2017 2216 - 12/21/2017 2257      12/21/2017             Resp Rate (set): 10                Electronically Signed By: KAJAL Davis CRNA  December 21, 2017  10:57 PM

## 2017-12-22 NOTE — CONSULTS
Received standard post-op orders for low residue diet education.  Chart reviewed, pt had an exp lap with colostomy yesterday.    [facility] consult has been ordered, they will do education on diet for colostomy.  We are following pt, please see nutrition assessment from 12/21.    Suha Ortez RD  Pager [phone] (M-F)            [phone] (W/E & Hol)

## 2017-12-22 NOTE — PLAN OF CARE
Problem: Patient Care Overview  Goal: Plan of Care/Patient Progress Review  Outcome: No Change  A&O, disoriented to place, [place]. VSS on 1LPM. CMS intact. +BS, + flatus. Denies pain. IVF. Echo, EKG done. Colostomy placement surgery this evening. Up Ax1 Walker/GB. NPO, ice chips. Oral cares done by pt and pt's family. Pt. Transferred down to Pre-op  around 18:45. After surgery will transfer to 33.

## 2017-12-22 NOTE — PROGRESS NOTES
Unable to comply with capnography due to patient being placed on high-flow oxygen. Upon arrival to the unit from PACU, patient's oxygen needs doubled (5L SFM in PACU per report and on 11-15L to maintain sat's in the low 90's.) Paged on-call MD and spoke with Dr. Graciela Foley. Orders placed for RT to come assess & treat patient. Now on high-flow oxygen.

## 2017-12-22 NOTE — PROGRESS NOTES
[facility]    Hospitalist Progress Note  Ryan Carter MD    Assessment & Plan     89 year old female, with PmHx of depression, hypertension, UTI, colon polyp and history of   breast cancer, who presented today with symptoms of abdominal distension, nausea and   vomiting post meals and constipation for 4 days. Workup done on 12/20/17 revealed, CMP significant for Alb 2.8. CBC revealed, Hb 11.5, WBC 14.8 and Plts 335. INR was 1.02. Lipase was normal. EKG on 12/20/17 revealed NSR rate 81/min. CT abd/pelvis on 12/20/17 revealed, colonic obstruction at the splenic flexure. An underlying mass lesion cannot be excluded. Sigmoid/descending colonic diverticulosis, without CT evidence of diverticulitis. Hepatic several small hypodensities, which due to their ill-defined nature are indeterminate and could represent small hepatic metastasis. Left lower lobe pulmonary nonspecific 0.4 cm nodule on the uppermost image. Bilateral renal cysts, two of which appear complex. Chest x-rays on 12/20/17 was normal. EKG done on 12/21/17 revealed, NSR rate 93/min, with occasional PVCs. ECHO done on 12/21/17 revealed, moderate to severe concentric LV hypertrophy,   with hyperdynamic systolic function. Visually, LVEF is estimated at 70-75%. No evidence of dynamic mitral regurgitation. Normal RV size with hyperdynamic systolic function. Trileaflet aortic valve sclerosis, without hemodynamically significant stenosis. Mild tricuspid regurgitation.      1. POD# 1 Exploratory laparoscopy, with loop transverse colostomy with biopsy: Appreciate Colorectal Surgery input. Appreciate Wound stoma nurse input. Keep NPO. Continue NG tube. Conitnue IV N/Saline. For IV antiemetics prn. Continue scheduled IV acetaminophen. For  IV dilaudid prn. Continue empiric IV Ciprofloxacin q12h and IV Metronidazole q8hrs. Ambulate as tolerated.    2. Acute hypoxic respiratory failure: the pt is currently on HFNC O2. Chest x-rays on 12/22/17  did not show any airspace consolidation, pneumothorax or pleural effusion. Heart size is normal.   For incentive spirometry prn. Wean down O2 as tolerated.      3. Hypertension: controlled. Will hold Amlodipine-Olmesartan. For IV hydralazine prn.     4. GERD prophylaxis: for IV protonix 40mg qd.     5. Acute blood loss anemia: due to recent abdominal surgery and hemodilution.   Hb 11.5-->9.3-->8.8g/dl on 12/22/17.    6. Severe malnutrition: this is due to underlying illness. Pt is currently NPO. Appreciate Nutritionist input. For low residue diet education      DVT Prophylaxis: Pneumatic Compression Devices  Code Status: Full Code    Disposition: Expected discharge, pending clinical course.      Interval History   The pt complained of abdominal pain at her surgical incisions. The pt's nurse reported that she had small output in her colostomy bag today.    -Data reviewed today: I reviewed all new labs and imaging results over the last 24 hours. I personally reviewed no images or EKG's today.    Physical Exam   Temp: 97.4  F (36.3  C) Temp src: Oral BP: 126/61 Pulse: 79 Heart Rate: 77 Resp: 16 SpO2: 92 % O2 Device: High Flow Nasal Cannula (HFNC) Oxygen Delivery: 10 LPM  Vitals:    12/20/17 0929 12/21/17 0646   Weight: 58.5 kg (129 lb) 61.1 kg (134 lb 9.6 oz)     Vital Signs with Ranges  Temp:  [97.2  F (36.2  C)-98.2  F (36.8  C)] 97.4  F (36.3  C)  Pulse:  [77-83] 79  Heart Rate:  [77-93] 77  Resp:  [16-29] 16  BP: (111-140)/(47-71) 126/61  FiO2 (%):  [55 %-65 %] 55 %  SpO2:  [90 %-98 %] 92 %  I/O last 3 completed shifts:  In: 1400 [I.V.:1400]  Out: 815 [Urine:655; Stool:150; Blood:10]    Constitutional: Elderly white female, awake, cooperative, no apparent distress, O2 Sats 96% on HFNC  Respiratory: BS vesicular bilaterally, no crackles or wheezing  Cardiovascular: S1 and S2, reg, with soft ESM murmur noted over the precordium  GI: Soft, non-distended, tenderness++ on palpation around laparoscopic sites, right sided  colostomy in situ+, BS present+  Skin/Integumen: No rashes  Extremities: No pedal edema    Medications     NaCl 75 mL/hr at 12/22/17 0233       sodium chloride (PF)  3 mL Intracatheter Q8H     enoxaparin  40 mg Subcutaneous Q24H     ciprofloxacin  400 mg Intravenous Q12H     metroNIDAZOLE  500 mg Intravenous Q8H     acetaminophen  1,000 mg Intravenous Q6H     sodium chloride (PF)  3 mL Intracatheter Q8H     pantoprazole  40 mg Intravenous QAM AC       Data     Recent Labs  Lab 12/22/17  0729 12/21/17  0611 12/20/17  0940   WBC  --  9.3 14.8*   HGB 8.8* 9.3* 11.5*   MCV  --  76* 74*    257 335   INR  --   --  1.02   NA  --  143 136   POTASSIUM  --  3.7 4.2   CHLORIDE  --  114* 106   CO2  --  21 22   BUN  --  15 18   CR 0.68 0.68 0.89   ANIONGAP  --  8 8   LUPE  --  8.0* 9.4   * 75 120*   ALBUMIN  --   --  2.8*   PROTTOTAL  --   --  7.0   BILITOTAL  --   --  0.8   ALKPHOS  --   --  81   ALT  --   --  12   AST  --   --  16   LIPASE  --   --  233       Recent Results (from the past 24 hour(s))   XR Chest Port 1 View    Narrative    CHEST ONE VIEW PORTABLE   12/22/2017 1:08 PM     HISTORY:  Hypoxic respiratory failure, post transverse loop colostomy.      COMPARISON: 12/20/2017.      Impression    IMPRESSION: Enteric catheter extends to the distal esophagus. The  distal tip is not clearly seen. No airspace consolidation,  pneumothorax, or pleural effusion. Heart size similar to prior.    WAQAS SALGADO MD

## 2017-12-22 NOTE — PROGRESS NOTES
"Colon & Rectal Surgery Progress Note             Interval History:   Postop Day #: 1 lap transverse loop colostomy  Increased O2 requirements. Vitals stable. Pain controlled with intermittent dilaudid.                  Medications:   I have reviewed this patient's current medications               Physical Exam:   Blood pressure 134/55, pulse 82, temperature 97.2  F (36.2  C), temperature source Oral, resp. rate 16, height 1.473 m (4' 10\"), weight 61.1 kg (134 lb 9.6 oz), SpO2 95 %, not currently breastfeeding.    Intake/Output Summary (Last 24 hours) at 12/22/17 0729  Last data filed at 12/21/17 2330   Gross per 24 hour   Intake          2070.83 ml   Output              240 ml   Net          1830.83 ml     GEN:  alert  ABD:  Softly distended, round, stoma pink with small amount of liquid stool in the bag.  Lap sites clean.         Data:        Lab Results   Component Value Date     12/21/2017    Lab Results   Component Value Date    CHLORIDE 114 12/21/2017    Lab Results   Component Value Date    BUN 15 12/21/2017      Lab Results   Component Value Date    POTASSIUM 3.7 12/21/2017    Lab Results   Component Value Date    CO2 21 12/21/2017    Lab Results   Component Value Date    CR 0.68 12/21/2017    CR 0.89 12/20/2017        Lab Results   Component Value Date    HGB 9.3 (L) 12/21/2017    HGB 11.5 (L) 12/20/2017     Lab Results   Component Value Date     12/21/2017     12/20/2017     Lab Results   Component Value Date    WBC 9.3 12/21/2017    WBC 14.8 (H) 12/20/2017            Assessment and Plan:   NGT for now. IVF.  Encourage ambulation. PT/OT  Stoma care and teaching.  Continue RRC, if falls out wait for MD or PA to replace, the proximal is on the right.   Await pathology of the peritoneal implant.  lovenox for now.       Olivia Alarcon MD  [facility]  Office Phone # [phone]    "

## 2017-12-22 NOTE — PLAN OF CARE
Problem: Patient Care Overview  Goal: Plan of Care/Patient Progress Review  Outcome: No Change  VSS on high-flow oxygen. Please see progress note. Dilaudid IVP given twice during shift after respiratory status improved. Colostomy intact w/minimal brown/serosanguineous output. NG tube secure and attached to wall suction; minimal output. Kincaid patent & secure w/adequate output. IVF infusing. IV ABX, scheduled IV Tylenol and Protonix given. Phenol throat spray given for c/o throat irritation. RT following patient. D/C pending progress.

## 2017-12-22 NOTE — OP NOTE
DATE OF PROCEDURE:   12/21/2017      PREOPERATIVE DIAGNOSIS:  Obstruction of the colon at the splenic flexure with additional lesion in the right colon and possible liver lesions.      POSTOPERATIVE DIAGNOSES:     1.  Obstruction of the colon at the splenic flexure with additional lesion in the right colon and possible liver lesions.     2.  Scattered peritoneal implants.      PROCEDURES:  Exploratory laparoscopy and laparoscopic loop transverse colostomy.      SURGEON:  Olivia Alarcon MD      ASSISTANT:  MANNIE KOENIG MD, [facility] Colorectal Surgery fellow.      ANESTHESIA:  General endotracheal anesthesia.      INDICATIONS:  Mary Grace Rivers is a 89-year-old woman who was admitted to the hospital yesterday with nausea, vomiting and abdominal pain.  She had been on antibiotics for urinary tract infection, but had progressive discomfort prompting her to be seen in the emergency room.  CT scan demonstrated dilated ascending and transverse colon with some dilatation of the small bowel and what appeared to be obstructing mass at the distal transverse colon.  There was an additional mass noted in your right colon that did not appear to be obstructing.  There were several indeterminate lesions in the liver concerning for possible metastasis.  She was passing a very small amount of gas, but no bowel movement for several days and had progressive abdominal pain.  For these reasons, recommended an exploratory laparoscopy with possible subtotal colectomy versus ileostomy or colostomy.  She in conjunction with daughter, Mili, also had a chance to go over the risks including bleeding and infection, both superficial and deep, possible anastomotic leak if one were created and other risks associated with major abdominal surgery and general anesthesia including but not limited to DVT, PE, heart attack, stroke and urinary tract infection, blood clots, damage to surrounding structures and even death.  We discussed that  there might be more lesions or more advanced disease.  She was briefed on the need for stoma.  She understood and wished to proceed.      FINDINGS:  Upon entering the abdomen, there was a moderate amount of ascites surrounding the liver.  There was some nodularity of the dome of the liver was unclear whether this was a cyst or clearly metastatic disease.  There was about a 0.5 cm nodule that appeared to be a peritoneal implant just above the splenic flexure in the upper abdomen.  The omentum itself looked somewhat nodular, although it was unclear whether that was clear involvement with metastatic disease.  The primary was difficult to assess given the omentum that was draped over this area, but given the peritoneal implants we elected to perform a transverse loop colostomy.      DESCRIPTION OF PROCEDURE:  After informed consent was obtained, the patient was taken to the operating room, positioned on the operating table in supine position.  She was intubated and a Kincaid catheter as well as a nasogastric tube were placed.  She was then positioned in modified lithotomy position and the abdomen was prepped and draped in the usual fashion after appropriate timeout, I began by making a 1 cm incision above the umbilicus and carried dissection down through the fascia, the peritoneal cavity was entered without difficulty.  An 0 Vicryl suture was placed at the level of fascia.  A Krysten port was inserted.  Insufflation revealed a very dilated right colon.  The liver was inspected and there was some nodularity in the right portion of the liver that seemed somewhat firm.  Inspection of the peritoneum, there was what appeared to be a clear implant in the left upper quadrant just overlying the spleen.  The omentum itself appeared a bit nodular although it was unclear whether this was overt metastasis.  We then placed 2 additional 5 mm ports in the right abdomen.  This allowed us to identify the terminal ileum which was  surprisingly decompressed given her recent CT scan.  We then turned our attention to that nodular area and grasped this and used the Bovie to excise this off and sent this as specimen as peritoneal implant.  Given what appeared to be a competent ileocecal valve on exam and the fact that the cecum was quite dilated and the terminal ileum was decompressed, instead of doing a diverting loop ileostomy we plan to do a transverse colostomy in the setting of this metastatic disease.  We used the LigaSure to take the omentum off of the transverse colon and allow for better mobilization.  We then grasped this area with a locking grasper and excised a disk of skin, carrying the dissection down through the fascia, the muscle was split and the posterior sheath was incised.  We then with some difficulty, were able to bring up the loop.  We had reinflated to inspect, confirming that our proximal was in fact towards the patient's right and the distal on the left and there was no twist.  We then removed the 5 mm ports and the fascia was reapproximated at the Krysten port using the previously placed 0 Vicryl suture.  The wounds were irrigated and reapproximated with 4-0 Monocryl and Dermabond.  We then matured the transverse colostomy through the previously marked spot with 3-0 Vicryl sutures with the proximal being towards the patient's right and the distal left.  We then placed an 18-[place] red rubber catheter into the proximal and irrigated retrieving roughly 100 mL of thick pasty stool.  The red rubber catheter was left in place and stoma appliance was placed.  She was returned to the supine position and taken to recovery in stable condition.  Sponge and needle counts correct at the end of the case.      ESTIMATED BLOOD LOSS:  10 mL.      COMPLICATIONS:  No immediate complications.      SPECIMENS:  The peritoneal biopsy.         PHIL ZUNIGA MD             D: 12/21/2017 22:57   T: 12/22/2017 10:39   MT: [id number]      Name:      KINJAL REYES   MRN:      [id number]        Account:        [id number]   :      1925           Procedure Date: 2017      Document: [id number]       cc: Olivia Alarcon MD

## 2017-12-23 NOTE — PLAN OF CARE
Problem: Patient Care Overview  Goal: Plan of Care/Patient Progress Review  Outcome: Improving  Pt alert to self only, has cha, productive cough, repositioned. Colostomy bag patent, low intermittent ng suction, in infusing, continue to monitor

## 2017-12-23 NOTE — PLAN OF CARE
Problem: Pain, Acute (Adult)  Intervention: Support/Optimize Psychosocial Response to Acute Pain  Pt alert to self and place, at times Confused to situation and time . Stoma pink protruding with catheter in place. Stoma bag changed by wound nurse today. NG to low intermittent suction, tan output. Pt pulled NG 10cm, pushed back in at 55cm.  Pt requests ice chips frequnetly. Kincaid with good output. IV dilaudid for pain.

## 2017-12-23 NOTE — PLAN OF CARE
Problem: Patient Care Overview  Goal: Plan of Care/Patient Progress Review  Outcome: Improving  VSS Disoriented to Time and situation. Turn and repo q2hrs. Pain controlled with IV tylenol. NPO with ice chips. Incision SULMA w/skin glue. Kincaid with good output. Stoma red and protruding; liquidy brown output. NG tube removed. BS active.

## 2017-12-23 NOTE — PROGRESS NOTES
COLON & RECTAL SURGERY PROGRESS NOTE    December 23, 2017  Post-op Day # 2 lap transverse loop colostomy    SUBJECTIVE:  Pt with increased oxygen requirement over night to 10 liters. More alert and oriented today. NG with minimal output. Colostomy with gas and stool output. Productive cough (witnessed pt cough up brownish sputum). CXR from yesterday shows no consolidation.    OBJECTIVE:  Temp:  [97.4  F (36.3  C)-98.2  F (36.8  C)] 98  F (36.7  C)  Pulse:  [79-96] 94  Heart Rate:  [77-95] 95  Resp:  [16-18] 16  BP: (121-138)/(53-89) 138/62  FiO2 (%):  [55 %] 55 %  SpO2:  [92 %-96 %] 92 %    Intake/Output Summary (Last 24 hours) at 12/23/17 1115  Last data filed at 12/23/17 0600   Gross per 24 hour   Intake              550 ml   Output             2600 ml   Net            -2050 ml       GENERAL:  Awake, alert, oriented x 3.  ABDOMEN:  Soft, NT, non-distended, colostomy pink with gas and stool in bag. Red rubber in place.    LABS:  Lab Results   Component Value Date    WBC 9.3 12/21/2017     Lab Results   Component Value Date    HGB 8.8 12/22/2017     Lab Results   Component Value Date    HCT 31.0 12/21/2017     Lab Results   Component Value Date     12/22/2017     Last Basic Metabolic Panel:  Lab Results   Component Value Date     12/21/2017      Lab Results   Component Value Date    POTASSIUM 3.7 12/21/2017     Lab Results   Component Value Date    CHLORIDE 114 12/21/2017     Lab Results   Component Value Date    LUPE 8.0 12/21/2017     Lab Results   Component Value Date    CO2 21 12/21/2017     Lab Results   Component Value Date    BUN 15 12/21/2017     Lab Results   Component Value Date    CR 0.68 12/22/2017     Lab Results   Component Value Date     12/22/2017       ASSESSMENT/PLAN: Stable POD #2 lap transverse loop colostomy. Stoma beginning to function. Pt with increased oxygen requirement over night to 10 liters. NG with minimal output. Pt appears to have productive cough despite negative  CXR yesterday.    Plan:  D/C NG   NPO for now considering high O2 requirement   Now that stoma is functioning, if red rubber catheter falls out, will leave out   Pulmonary / O2 requirement per hospitalist. Wean O2 as sats allow. Removing NG may help pulmonary function.   Path pending       Stephon Frias MD  Colon & Rectal Surgery  Office [phone]

## 2017-12-23 NOTE — PLAN OF CARE
Problem: Patient Care Overview  Goal: Plan of Care/Patient Progress Review  Discharge Planner PT   Patient plan for discharge: pt hopeful to discharge to home  Current status: pt completes supine to sit with minAx1, sit<>stand with MiNAx1, takes steps at EOB forward/backward with MinAx1. Increased pain noted with positional changes. Good participation in supine LE ex.  Barriers to return to prior living situation: lives alone in IND living, pain, current level of assist, falls risk  Recommendations for discharge: TCU  Rationale for recommendations: pt has good potential to return to prior level of function with continued skilled PT intervention prior to return home       Entered by: Olivia Pedersen 12/23/2017 8:43 AM     Goals and POC updated per pt progress.

## 2017-12-24 NOTE — PLAN OF CARE
Problem: Patient Care Overview  Goal: Plan of Care/Patient Progress Review  Outcome: No Change  Patient disoriented to time and situation. VSS on 8L NC overnight. Incision SULMA w/ liquid glue. Stoma red and protruding. Liquid brown output. BS active. Infrequent productive cough. Pain managed with scheduled acetaminophen. Kincaid discontinued this AM, patient due to void. Patient repositioned when allowed, reeducation provided. Slept between cares. Will continue to monitor.

## 2017-12-24 NOTE — PLAN OF CARE
Problem: Patient Care Overview  Goal: Plan of Care/Patient Progress Review  Outcome: Improving  AVSS on RA; patient denies continued pain. Colostomy red and protruding with good amount brown liquidy stool. Lap sites WDL SULMA. UP with a01 walker and gait belt. Advanced to full liquid diet. Voiding well.

## 2017-12-24 NOTE — PLAN OF CARE
Problem: Patient Care Overview  Goal: Plan of Care/Patient Progress Review  PT: Pt attempted, supine in bed, reporting she hasn't eaten yet and no one has ordered her breakfast, per board pt's diet states clear, relayed pt request to RN, pt agreeable to PT to attempt back later as able once she has eaten.

## 2017-12-24 NOTE — PROGRESS NOTES
[facility]    Hospitalist Progress Note  Ryan Carter MD    Assessment & Plan     89 year old female, with PmHx of depression, hypertension, UTI, colon polyp and history of   breast cancer, who presented today with symptoms of abdominal distension, nausea and   vomiting post meals and constipation for 4 days. Workup done on 12/20/17 revealed, CMP significant for Alb 2.8. CBC revealed, Hb 11.5, WBC 14.8 and Plts 335. INR was 1.02. Lipase was normal. EKG on 12/20/17 revealed NSR rate 81/min. CT abd/pelvis on 12/20/17 revealed, colonic obstruction at the splenic flexure. An underlying mass lesion cannot be excluded. Sigmoid/descending colonic diverticulosis, without CT evidence of diverticulitis. Hepatic several small hypodensities, which due to their ill-defined nature are indeterminate and could represent small hepatic metastasis. Left lower lobe pulmonary nonspecific 0.4 cm nodule on the uppermost image. Bilateral renal cysts, two of which appear complex. Chest x-rays on 12/20/17 was normal. EKG done on 12/21/17 revealed, NSR rate 93/min, with occasional PVCs. ECHO done on 12/21/17 revealed, moderate to severe concentric LV hypertrophy, with  hyperdynamic systolic function. Visually, LVEF is estimated at 70-75%. No evidence of dynamic mitral regurgitation. Normal RV size with hyperdynamic systolic function. Trileaflet aortic valve sclerosis, without hemodynamically significant stenosis. Mild tricuspid regurgitation.      1. POD# 2 Exploratory laparoscopy, with loop transverse colostomy with biopsy: Appreciate Colorectal Surgery input. Appreciate ostomy nurse input. S/P NG tube. Continue IV N/Saline. Started clear liquid diet today. For IV antiemetics prn. Continue scheduled IV acetaminophen. For  IV dilaudid prn.     2. Acute hypoxic respiratory failure: improving. S/P HFNC O2. Pt is on 2L via NC today. Chest x-rays on 12/22/17 did not show any airspace consolidation, pneumothorax or  pleural effusion. Heart size is normal. For incentive spirometry prn.      3. Hypertension: controlled. Amlodipine-Olmesartan are on hold. For IV hydralazine prn.     4. GERD prophylaxis: for IV protonix 40mg qd.     5. Acute blood loss anemia: due to recent abdominal surgery and hemodilution.   Hb 11.5-->9.3-->8.8g/dl on 12/22/17. For Hb in the am.    6. Severe malnutrition: this is due to underlying illness. Appreciate Nutritionist input.      DVT Prophylaxis: Pneumatic Compression Devices  Code Status: Full Code    Disposition: Expected discharge, pending clinical course.      Interval History   The pt complained of abdominal pain at her surgical incisions. She complained of back pain and feeling tired. She has been lying in bed most of today. She has liquid brown fecal material in her colostomy bag.    -Data reviewed today: I reviewed all new labs and imaging results over the last 24 hours. I personally reviewed no images or EKG's today.    Physical Exam   Temp: 97.5  F (36.4  C) Temp src: Oral BP: 130/68 Pulse: 93 Heart Rate: 95 Resp: 16 SpO2: 93 % O2 Device: Nasal cannula Oxygen Delivery: 2 LPM  Vitals:    12/20/17 0929 12/21/17 0646   Weight: 58.5 kg (129 lb) 61.1 kg (134 lb 9.6 oz)     Vital Signs with Ranges  Temp:  [97.4  F (36.3  C)-98.2  F (36.8  C)] 97.5  F (36.4  C)  Pulse:  [81-96] 93  Heart Rate:  [93-95] 95  Resp:  [16-18] 16  BP: (121-138)/(53-89) 130/68  FiO2 (%):  [55 %] 55 %  SpO2:  [92 %-96 %] 93 %  I/O last 3 completed shifts:  In: 1820 [P.O.:420; I.V.:1400]  Out: 2525 [Urine:1250; Emesis/NG output:300; Stool:975]    Constitutional: Elderly white female, awake, lethargic+, O2 Sats 93% on 2L via NC  Respiratory: BS vesicular bilaterally, no crackles or wheezing  Cardiovascular: S1 and S2, reg, with soft ESM murmur noted over the precordium  GI: Soft, non-distended, tenderness++ on palpation around laparoscopic sites, right sided colostomy in situ+, BS present+  Skin/Integumen: No  rashes  Extremities: No pedal edema    Medications     NaCl 75 mL/hr at 12/23/17 1438       sodium chloride (PF)  3 mL Intracatheter Q8H     enoxaparin  40 mg Subcutaneous Q24H     acetaminophen  1,000 mg Intravenous Q6H     sodium chloride (PF)  3 mL Intracatheter Q8H     pantoprazole  40 mg Intravenous QAM AC       Data     Recent Labs  Lab 12/22/17  0729 12/21/17  0611 12/20/17  0940   WBC  --  9.3 14.8*   HGB 8.8* 9.3* 11.5*   MCV  --  76* 74*    257 335   INR  --   --  1.02   NA  --  143 136   POTASSIUM  --  3.7 4.2   CHLORIDE  --  114* 106   CO2  --  21 22   BUN  --  15 18   CR 0.68 0.68 0.89   ANIONGAP  --  8 8   LUPE  --  8.0* 9.4   * 75 120*   ALBUMIN  --   --  2.8*   PROTTOTAL  --   --  7.0   BILITOTAL  --   --  0.8   ALKPHOS  --   --  81   ALT  --   --  12   AST  --   --  16   LIPASE  --   --  233       No results found for this or any previous visit (from the past 24 hour(s)).

## 2017-12-24 NOTE — PLAN OF CARE
Problem: Patient Care Overview  Goal: Plan of Care/Patient Progress Review  A/OX4. Sleepy most of the time.VSS. O2 2L NC. Abd incisions CDI/SULMA. Stoma patent ,red,protruding with medium brown output in the colostomy bag. BS hypo, gas via colostomy bag. Tolerating clear diet well. Kincaid cath patent with adequate UOP. Pain managed with naun IV IV acetaminophen and PRN IV dilaudid. Re-positioned and turned per pt tolerance.

## 2017-12-24 NOTE — PROGRESS NOTES
COLON & RECTAL SURGERY PROGRESS NOTE      SUBJECTIVE:    -Mild delirum o/n; better this am  -O2 req down to 8L from 10L   -NGT removed in am and diet advanced to clears which she tolerated with no N/V. Wants more to eat  -stoma with continued outpit (~900cc yest)       OBJECTIVE:  Temp:  [97.5  F (36.4  C)-98.1  F (36.7  C)] 97.9  F (36.6  C)  Pulse:  [75-93] 85  Heart Rate:  [88-95] 88  Resp:  [16] 16  BP: (130-140)/(61-68) 130/67  SpO2:  [92 %-93 %] 92 %      Intake/Output Summary (Last 24 hours) at 12/24/17 0916  Last data filed at 12/24/17 0600   Gross per 24 hour   Intake             1740 ml   Output             1975 ml   Net             -235 ml       Physical Exam:  GEN: NAD, intermittently confused but easily re-orientable  Abd: soft, minimally distended, minimally ttp, stoma pink/healthy with gas and stool in bag.     LABS:   Lab Results   Component Value Date    WBC 9.3 12/21/2017    HGB 8.8 (L) 12/22/2017    HCT 31.0 (L) 12/21/2017     12/24/2017     12/21/2017    POTASSIUM 3.7 12/21/2017    CHLORIDE 114 (H) 12/21/2017    CO2 21 12/21/2017    BUN 15 12/21/2017    CR 0.68 12/22/2017     (H) 12/22/2017    TROPI 0.026 12/06/2017    AST 16 12/20/2017    ALT 12 12/20/2017    ALKPHOS 81 12/20/2017    BILITOTAL 0.8 12/20/2017    INR 1.02 12/20/2017       ASSESSMENT/PLAN: 89F POD# status post lap transverse loop colostomy. Still with high O2 req but improving and with increasing stoma output  -advance diet to fulls.   -Continue pulm toilet and weaning O2 as tolerated.  -Path pending      --  Louann Trejo MD MPH  Colon and Rectal Surgery Fellow

## 2017-12-24 NOTE — PROGRESS NOTES
[facility]    Hospitalist Progress Note  Belen Richards MD    Assessment & Plan     89 year old female, with PmHx of depression, hypertension, UTI, colon polyp and history of   breast cancer, who presented today with symptoms of abdominal distension, nausea and   vomiting post meals and constipation for 4 days. Workup done on 12/20/17 revealed, CMP significant for Alb 2.8. CBC revealed, Hb 11.5, WBC 14.8 and Plts 335. INR was 1.02. Lipase was normal. EKG on 12/20/17 revealed NSR rate 81/min. CT abd/pelvis on 12/20/17 revealed, colonic obstruction at the splenic flexure. An underlying mass lesion cannot be excluded. Sigmoid/descending colonic diverticulosis, without CT evidence of diverticulitis. Hepatic several small hypodensities, which due to their ill-defined nature are indeterminate and could represent small hepatic metastasis. Left lower lobe pulmonary nonspecific 0.4 cm nodule on the uppermost image. Bilateral renal cysts, two of which appear complex. Chest x-rays on 12/20/17 was normal. EKG done on 12/21/17 revealed, NSR rate 93/min, with occasional PVCs. ECHO done on 12/21/17 revealed, moderate to severe concentric LV hypertrophy, with hyperdynamic systolic function. Visually, LVEF is estimated at 70-75%. No evidence of dynamic mitral regurgitation. Normal RV size with hyperdynamic systolic function. Trileaflet aortic valve sclerosis, without hemodynamically significant stenosis. Mild tricuspid regurgitation.      1. POD# 3 Exploratory laparoscopy, with loop transverse colostomy with biopsy:  - post op care as per CR service, including pain control and DVT/px  - NGT out  - started on clear liquids yesterday- plan to advance to full liquids diet today; ostomy bag with brown liquid stool;   - having intermittent abdominal pain that last for few seconds and improves with deep breathing (related to gas?)  - Appreciate ostomy nurse input  - was on Cipro/Flagyl- now off ABX  - c/o Protonix  "iv, antiemetics prn, try to minimize dilaudid prn since it seems that hypoxia might be related to narcotics  - pathology report pending  - PT eval    2. Acute hypoxic respiratory failure:  - Echo as above  - was on HFNC- now improving- down to 2L in am and now sat well on RA?  - Chest x-rays on 12/22/17 did not show any airspace consolidation, pneumothorax or pleural effusion. Heart size is normal.  - possible related to Dilaudid use- so will try to minimize  - IS prn.      3. Hypertension: controlled.   - PTA Amlodipine-Olmesartan are on hold. For IV hydralazine prn.     4. GERD prophylaxis: for IV protonix 40mg qd.     5. Acute blood loss anemia: due to recent abdominal surgery and hemodilution.   Hb 11.5-->9.3-->8.8g/dl on 12/22/17    6. Severe malnutrition: this is due to underlying illness. Appreciate Nutritionist input.    DVT Prophylaxis: Pneumatic Compression Devices  Code Status: Full Code    Disposition: Expected discharge- pending clinical course.      Interval History   Reports intermittent abdominal pain on right side that usually lasts for 10-15 seconds and \"improves with deep breathing\", no N/V, no SOB, no chest pain, no coughing; discussed with bedside RN    -Data reviewed today: I reviewed all new labs and imaging results over the last 24 hours. I personally reviewed no images or EKG's today.    Physical Exam   Temp: 97.6  F (36.4  C) Temp src: Oral BP: 129/56 Pulse: 84 Heart Rate: 88 Resp: 16 SpO2: 95 % O2 Device: Nasal cannula Oxygen Delivery: 2 LPM  Vitals:    12/20/17 0929 12/21/17 0646 12/24/17 0600   Weight: 58.5 kg (129 lb) 61.1 kg (134 lb 9.6 oz) 58.3 kg (128 lb 8.5 oz)     Vital Signs with Ranges  Temp:  [97.5  F (36.4  C)-98.1  F (36.7  C)] 97.6  F (36.4  C)  Pulse:  [75-93] 84  Heart Rate:  [88] 88  Resp:  [16] 16  BP: (129-140)/(56-68) 129/56  SpO2:  [92 %-95 %] 95 %  I/O last 3 completed shifts:  In: 700 [P.O.:700]  Out: 1725 [Urine:1000; Stool:725]    Constitutional: Elderly white " female, awake, alert, NAD, forgetful  Respiratory: BS vesicular bilaterally, no crackles or wheezing  Cardiovascular: S1 and S2, reg, with soft ESM murmur noted over the precordium  GI: Soft, non-distended, tenderness++ on palpation on right side, right sided colostomy in situ+, BS present+  Skin/Integumen: No rashes  Extremities: No pedal edema    Medications     NaCl Stopped (12/24/17 1100)       sodium chloride (PF)  3 mL Intracatheter Q8H     enoxaparin  40 mg Subcutaneous Q24H     acetaminophen  1,000 mg Intravenous Q6H     pantoprazole  40 mg Intravenous QAM AC       Data     Recent Labs  Lab 12/24/17  0655 12/22/17  0729 12/21/17  0611 12/20/17  0940   WBC  --   --  9.3 14.8*   HGB  --  8.8* 9.3* 11.5*   MCV  --   --  76* 74*    239 257 335   INR  --   --   --  1.02   NA  --   --  143 136   POTASSIUM  --   --  3.7 4.2   CHLORIDE  --   --  114* 106   CO2  --   --  21 22   BUN  --   --  15 18   CR  --  0.68 0.68 0.89   ANIONGAP  --   --  8 8   LUPE  --   --  8.0* 9.4   GLC  --  140* 75 120*   ALBUMIN  --   --   --  2.8*   PROTTOTAL  --   --   --  7.0   BILITOTAL  --   --   --  0.8   ALKPHOS  --   --   --  81   ALT  --   --   --  12   AST  --   --   --  16   LIPASE  --   --   --  233       No results found for this or any previous visit (from the past 24 hour(s)).

## 2017-12-25 NOTE — PROGRESS NOTES
Care Transition Initial Assessment - GEOVANNY  Reason For Consult: discharge planning  Met with: Patient and Family    Active Problems:    Colonic mass    Colon cancer (H)         DATA  Lives With: alone  Living Arrangements: apartment     GEOVANNY has reviewed pt records. Pt is Mary Grace, a 89yo female who was admitted on 12/20/17 due to a large bowel obstruction, underwent exp lap with lap transverse colostomy on 12/21/17. Pt currently resides in an independent senior apartment in [place]. It is recommended that pt discharge to a TCU prior to returning home. GEOVANNY met with pt and her daughter, Mili, this afternoon to introduce self/role, perform assessment, and discuss discharge planning. Pt had questions about insurance coverage for TCU which was addressed by this writer. SW provided SNF list and reviewed with daughter, ideally pt would be placed near her home in [facility]. After discussion, GEOVANNY made referrals to the following facilities via [facility]: [facility], [facility], and [facility]. SW discuss private vs double rooms at the facilities; pt daughter made aware of the private room fee at [facility] and though believe that a semi-private would be preferred she would still like the referral sent. General expectations for TCU stay explained. SW to update pt and daughter as able, likely will not hear back from facilities until tomorrow 12/26 due the holiday.    ASSESSMENT  Cognitive Status:  awake, alert     PLAN  Financial costs for the patient includes: None known at this time.  Patient given options and choices for discharge Yes.  Patient/family is agreeable to the plan?  Yes  Patient Goals and Preferences: TCU.  Patient anticipates discharging to:  TCU.    LELA Cope, LICSW  Daytime (8:00am-4:30pm): [phone]  After-Hours SW Pager (4:30pm-11:30pm): [phone]

## 2017-12-25 NOTE — PROGRESS NOTES
COLON & RECTAL SURGERY PROGRESS NOTE      SUBJECTIVE:    -Jaspreet fulls with no N/V  -~1L from stoma; +gas  -c/o some gas pain  -on RA now       OBJECTIVE:  Temp:  [97.6  F (36.4  C)-98.2  F (36.8  C)] 98  F (36.7  C)  Pulse:  [77-88] 88  Heart Rate:  [87] 87  Resp:  [16-18] 16  BP: (129-149)/(56-84) 134/66  SpO2:  [91 %-96 %] 91 %      Intake/Output Summary (Last 24 hours) at 12/25/17 0833  Last data filed at 12/25/17 0438   Gross per 24 hour   Intake              600 ml   Output             1925 ml   Net            -1325 ml       Physical Exam:  GEN: NAD, intermittently confused but easily re-orientable  Abd: soft, ND, minimally ttp, stoma pink/healthy with gas and stool in bag.       LABS:   Lab Results   Component Value Date    WBC 9.3 12/21/2017    HGB 8.8 (L) 12/22/2017    HCT 31.0 (L) 12/21/2017     12/24/2017     12/21/2017    POTASSIUM 3.7 12/21/2017    CHLORIDE 114 (H) 12/21/2017    CO2 21 12/21/2017    BUN 15 12/21/2017    CR 0.68 12/22/2017     (H) 12/22/2017    TROPI 0.026 12/06/2017    AST 16 12/20/2017    ALT 12 12/20/2017    ALKPHOS 81 12/20/2017    BILITOTAL 0.8 12/20/2017    INR 1.02 12/20/2017       ASSESSMENT/PLAN: 89F POD# 4 status post lap transverse loop colostomy.   -advance diet to low fiber.   -transition to PO pain meds in afternoon if doing ok with diet  -simethicone ordered to help w/ gas pain  -Continue pulm toilet  -Path pending      --  Louann Trejo MD MPH  Colon and Rectal Surgery Fellow

## 2017-12-25 NOTE — PLAN OF CARE
Problem: Patient Care Overview  Goal: Plan of Care/Patient Progress Review  Outcome: No Change  Vs stable, tolerating low fiber but not taking much water in, voiding, incisions intact with liquid bandage, ambulating with 1 and walker, c/o gas pain simethocone and heat given, colostomy intact with stool and flatus, stoma red and protruding, ambulating and IS encouraged.

## 2017-12-25 NOTE — PLAN OF CARE
Problem: Patient Care Overview  Goal: Plan of Care/Patient Progress Review  Outcome: No Change  Pt A/O, VSS on RA.  Cheesh-Na.  C/o severe gas pain intermittently.  Prn dilaudid x1, scheduled IV tylenol.  Pt encouraged to breathe through gas pain episodes.  Abd lap sites CDI, SULMA.  Stoma red and protruding, brown output, RRC is out of stoma.  +BS, +gas, voiding adequately.  Full liquid diet, denies nausea. Up w/asst x1.

## 2017-12-25 NOTE — PROGRESS NOTES
[facility]    Hospitalist Progress Note  Belen Richards MD    Assessment & Plan     89 year old female, with PmHx of depression, hypertension, UTI, colon polyp and history of breast cancer, who presented on 12/20/2017 with abdominal distension, nausea and vomiting post meals and constipation for 4 days. Workup done on 12/20/17 revealed, CMP significant for Alb 2.8. CBC revealed, Hb 11.5, WBC 14.8 and Plts 335. INR was 1.02. Lipase was normal. EKG on 12/20/17 revealed NSR rate 81/min. CT abd/pelvis on 12/20/17 revealed, colonic obstruction at the splenic flexure. An underlying mass lesion cannot be excluded. Sigmoid/descending colonic diverticulosis, without CT evidence of diverticulitis. Hepatic several small hypodensities, which due to their ill-defined nature are indeterminate and could represent small hepatic metastasis. Left lower lobe pulmonary nonspecific 0.4 cm nodule on the uppermost image. Bilateral renal cysts, two of which appear complex. Chest x-rays on 12/20/17 was normal. EKG done on 12/21/17 revealed, NSR rate 93/min, with occasional PVCs. ECHO done on 12/21/17 revealed, moderate to severe concentric LV hypertrophy, with hyperdynamic systolic function. Visually, LVEF is estimated at 70-75%. No evidence of dynamic mitral regurgitation. Normal RV size with hyperdynamic systolic function. Trileaflet aortic valve sclerosis, without hemodynamically significant stenosis. Mild tricuspid regurgitation.      1. POD# 4 Exploratory laparoscopy, with loop transverse colostomy with biopsy:  - post op care as per CR service, including pain control and DVT/px  - NGT out  - started on clear liquids, then advanced to full liquids diet and low fiber diet today; ostomy bag with brown, more formed stool today;   - still having intermittent abdominal pain that last for few seconds and improves with deep breathing (related to gas?)- started on Simethicone prn  - Appreciate ostomy nurse input  - was  on Cipro/Flagyl- now off ABX  - c/o antiemetics prn, try to minimize dilaudid prn since it seems that hypoxia might be related to narcotics  - pathology report pending  - PT eval  - will need TCU at the time of the d/c SW aware    2. Acute hypoxic respiratory failure- resolved  - Echo with normal EF as above  - was on HFNC- now improving- down to 2L yesterday and now sat well 91% on RA  - Chest x-rays on 12/22/17 did not show any airspace consolidation, pneumothorax or pleural effusion. Heart size is normal.  - likely related to Dilaudid use- so will try to minimize  - IS prn.   - stop iv fluids if tolerates po intake     3. Hypertension: controlled.   - PTA Amlodipine-Olmesartan are on hold.  - IV hydralazine prn.     4. GERD prophylaxis: on protonix 40mg qd.     5. Acute blood loss anemia: due to recent abdominal surgery and hemodilution.   Hb 11.5-->9.3-->8.8g/dl on 12/22/17    6. Severe malnutrition: this is due to underlying illness. Appreciate Nutritionist input.    DVT Prophylaxis: Pneumatic Compression Devices  Code Status: Full Code    Disposition: Expected discharge to TCU in 1-2 days pending surgery recommendations     Interval History   Doing fine but still with intermittent gas pain; no N/V, no SOB, no chest pain, no coughing; discussed with daughter at bedside    -Data reviewed today: I reviewed all new labs and imaging results over the last 24 hours. I personally reviewed no images or EKG's today.    Physical Exam   Temp: 98.4  F (36.9  C) Temp src: Oral BP: 129/57 Pulse: 88 Heart Rate: 87 Resp: 16 SpO2: 91 % O2 Device: None (Room air) Oxygen Delivery: 2 LPM  Vitals:    12/21/17 0646 12/24/17 0600 12/25/17 0414   Weight: 61.1 kg (134 lb 9.6 oz) 58.3 kg (128 lb 8.5 oz) 62.3 kg (137 lb 5.6 oz)     Vital Signs with Ranges  Temp:  [97.6  F (36.4  C)-98.4  F (36.9  C)] 98.4  F (36.9  C)  Pulse:  [77-88] 88  Heart Rate:  [87] 87  Resp:  [16-18] 16  BP: (129-149)/(57-84) 129/57  SpO2:  [91 %-96 %] 91 %  I/O  last 3 completed shifts:  In: 600 [P.O.:600]  Out: 2175 [Urine:1200; Stool:975]    Constitutional: Elderly white female, awake, alert, NAD, forgetful  Respiratory: BS vesicular bilaterally, no crackles or wheezing  Cardiovascular: S1 and S2, reg, with soft ESM murmur noted over the precordium  GI: Soft, non-distended, tenderness++ on palpation on right side, right sided colostomy in situ+, BS present+  Skin/Integumen: No rashes  Extremities: No pedal edema    Medications     NaCl 75 mL/hr at 12/25/17 0841       acetaminophen  1,000 mg Oral Q8H     [START ON 12/26/2017] pantoprazole  40 mg Oral QAM AC     sodium chloride (PF)  3 mL Intracatheter Q8H     enoxaparin  40 mg Subcutaneous Q24H       Data     Recent Labs  Lab 12/24/17  0655 12/22/17  0729 12/21/17  0611 12/20/17  0940   WBC  --   --  9.3 14.8*   HGB  --  8.8* 9.3* 11.5*   MCV  --   --  76* 74*    239 257 335   INR  --   --   --  1.02   NA  --   --  143 136   POTASSIUM  --   --  3.7 4.2   CHLORIDE  --   --  114* 106   CO2  --   --  21 22   BUN  --   --  15 18   CR  --  0.68 0.68 0.89   ANIONGAP  --   --  8 8   LUPE  --   --  8.0* 9.4   GLC  --  140* 75 120*   ALBUMIN  --   --   --  2.8*   PROTTOTAL  --   --   --  7.0   BILITOTAL  --   --   --  0.8   ALKPHOS  --   --   --  81   ALT  --   --   --  12   AST  --   --   --  16   LIPASE  --   --   --  233       No results found for this or any previous visit (from the past 24 hour(s)).

## 2017-12-25 NOTE — PLAN OF CARE
Problem: Patient Care Overview  Goal: Plan of Care/Patient Progress Review  Alert, disoriented to time and situation.VSS on RA. Abd lap sites CDI/SULMA. Stoma patent ,red,protruding with medium brown output in the colostomy bag. RRC dislodged from the stoma. BS hypo, gas via colostomy bag. Tolerating full diet well. Gas pain managed with naun IV acetaminophen and breathing. Adequate UOP in the bathroom. Ambulating in the hallway with A1+walker and gait belt.

## 2017-12-26 NOTE — PROGRESS NOTES
Care Coordination:    SW consult needed for disposition planning.  SW consult added.    Rosemary Barahona RN, BSN  [facility] Care Coordinator   Mobile Phone: [phone]

## 2017-12-26 NOTE — PLAN OF CARE
Problem: Patient Care Overview  Goal: Plan of Care/Patient Progress Review  Outcome: No Change  Runs hypertensive, other VSS.  LS clear, +BS, flatus and watery stool per ostomy.  C/o gas pains frequently, ambulation encouraged.  Tylenol as scheduled, simethecone with no improvement.  Up Ax1 w/ walker, voiding adequately.

## 2017-12-26 NOTE — PLAN OF CARE
Problem: Patient Care Overview  Goal: Plan of Care/Patient Progress Review  Outcome: Improving  A/O x4. AVSS on RA; hypertensive at times. Ambulating assist of 1, walker. Colostomy intact, watery output, + flatus. Stoma pink, protruding. Abdominal incision, liquid bandage, SULMA, WDL. Pain managed with scheduled Tylenol. Tolerating low fiber diet. Voiding adequately.

## 2017-12-26 NOTE — PROGRESS NOTES
"New RUQ Transverse Loop  Colostomy:        1.Change barrier  2x/wk and prn   2. Empty or change closed pouch when 1/3 full of stool/gas  3. Will not harm appliance to get wet during bathing.        Blow dry (on cool setting) cloth tape and backing after bath or shower  4. Iniitally Eat 6 small meals/day. No dietary restrictions related to colostomy  5. Drink plenty of fluids  6. Always carry and emergency appliance system  7. For best results use a odor eliminator (Febreeze) in your bathroom prior to emptying/changing the appliance  8. No heavy lifting, no sit-ups but walk.  9. OK to take steps.       Supplies:  [employer] New image 2pc: 20 pouch changes/month  1. Barrier: flextend, convex  cut to fit with tape                           #08033  5/bx = 2-4bx/month  2. Pouch: Opaque lock n roll                                                    #07073 10/bx = 1-2bx/month OR  3. Pouch: Closed opaque no  filter                                            #[id number] 30/bx = 1-2bx/month  4. Optional if leakage:  Adapt ring                                             #7805   10/bx = 1-2bx/month      Procedure for appliance change:  1. Draw and cut opening in barrier (If cut to fit)  following pattern, approx 2\"  2. Remove plastic backing  3. Optional if leakage:  Open ring. Stretch to approx size of opening in pouch.   4.  Optional: Place full ring around opening on sticky side of pouch. Press into place  5. Fold back edge of tape to create a \"tab\" This assists with paper removal in Step #11  6 Set barrier aside  7. Gently remove pouching system from around stoma. Discard.  8. Clean skin around stoma with disposable wipe or moist gauze. Discard.  9. Pat the skin dry  10. Center stoma in barrier opening. Press barrier to skin  11. Pull on \"tabs\" to remove paper that covers the tape. Press tape to skin  12. Close lock n roll closure on drainable pouch.  If using closed pouch it can be changed up to 2x/day      Call for any " ?/concerns:  Mirtha FOSTER (ostomy nurses) @ [facility]  (C) [phone]  (W) [phone]

## 2017-12-26 NOTE — PROGRESS NOTES
CLINICAL NUTRITION SERVICES - REASSESSMENT NOTE      EVALUATION OF PROGRESS TOWARD GOALS   Diet:  Low fiber started 12/25. Sending Ensure between meals.  Intake:  Pt is eating % and she states she's drinking the Ensure.    Previous Goals (12/21):   Diet will be advanced in the next 3-5 days and pt will consume > 50% meals.  Evaluation: Not met, didn't advance until 12/25    Previous Nutrition Diagnosis:   Inadequate protein-energy intake related to altered GI function as evidenced by limited nutritional intake over past week with resultant 4% weight loss.  Evaluation: Resolved      CURRENT NUTRITION DIAGNOSIS  No nutrition diagnosis identified at this time     INTERVENTIONS  Recommendations / Nutrition Prescription  Continue diet, supplements    Implementation  Composition of Meals and Snacks - encouraged good intake    Goals  Pt will continue to consume at least 75% of meals and supplements      MONITORING AND EVALUATION:  Progress towards goals will be monitored and evaluated per protocol and Practice Guidelines    Suha Ortez RD  Pager [phone] (M-F)            [phone] (W/E & Hol)

## 2017-12-26 NOTE — PROGRESS NOTES
[facility]    Hospitalist Progress Note  Belen Richards MD    Assessment & Plan     89 year old female, with PmHx of depression, hypertension, UTI, colon polyp and history of breast cancer, who presented on 12/20/2017 with abdominal distension, nausea and vomiting post meals and constipation for 4 days. Workup done on 12/20/17 revealed, CMP significant for Alb 2.8. CBC revealed, Hb 11.5, WBC 14.8 and Plts 335. INR was 1.02. Lipase was normal. EKG on 12/20/17 revealed NSR rate 81/min. CT abd/pelvis on 12/20/17 revealed, colonic obstruction at the splenic flexure. An underlying mass lesion cannot be excluded. Sigmoid/descending colonic diverticulosis, without CT evidence of diverticulitis. Hepatic several small hypodensities, which due to their ill-defined nature are indeterminate and could represent small hepatic metastasis. Left lower lobe pulmonary nonspecific 0.4 cm nodule on the uppermost image. Bilateral renal cysts, two of which appear complex. Chest x-rays on 12/20/17 was normal. EKG done on 12/21/17 revealed, NSR rate 93/min, with occasional PVCs. ECHO done on 12/21/17 revealed, moderate to severe concentric LV hypertrophy, with hyperdynamic systolic function. Visually, LVEF is estimated at 70-75%. No evidence of dynamic mitral regurgitation. Normal RV size with hyperdynamic systolic function. Trileaflet aortic valve sclerosis, without hemodynamically significant stenosis. Mild tricuspid regurgitation.      1. POD# 5 Exploratory laparoscopy, with loop transverse colostomy with biopsy:  - post op care as per CR service, including pain control and DVT/px  - NGT out  - started on clear liquids, then advanced to full liquids diet-->low fiber diet now; ostomy bag with liquid stool  - was having intermittent abdominal pain related to passing gas --> started on Simethicone prn on 12/25 and today seems better  - encourage ambulation  - Appreciate ostomy nurse input  - was on Cipro/Flagyl- now  off ABX  - c/o antiemetics prn, try to minimize dilaudid prn since it seems that hypoxia might be related to narcotics  - pathology report still pending  - PT eval  - will need TCU at the time of the d/c; SW aware    2. Acute hypoxic respiratory failure- resolved  - Echo with normal EF as above  - was on HFNC- now improving- down to 2L yesterday and now sat well 91% on RA  - Chest x-rays on 12/22/17 did not show any airspace consolidation, pneumothorax or pleural effusion. Heart size is normal.  - likely related to Dilaudid use- so will try to minimize  - off iv fluids  - IS prn.      3. Hypertension: controlled.   - PTA Amlodipine-Olmesartan are on hold.  - IV hydralazine prn.     4. GERD prophylaxis: on protonix 40mg qd.     5. Acute blood loss anemia: due to recent abdominal surgery and hemodilution.   Hb 11.5-->9.3-->8.8g/dl on 12/22/17    6. Severe malnutrition: this is due to underlying illness. Appreciate Nutritionist input.    DVT Prophylaxis: Lovenox  Code Status: Full Code    Disposition: medically ready for d/c to TCU pending insurance authorization    Interval History   Doing better today, was sleeping comfortable, woke up, reported some abdominal pain but seems improved, tolerating diet, no chest pain, no SOB; discussed with bedside RN and CC     -Data reviewed today: I reviewed all new labs and imaging results over the last 24 hours. I personally reviewed no images or EKG's today.    Physical Exam   Temp: 98.4  F (36.9  C) Temp src: Oral BP: 139/64 Pulse: 88 Heart Rate: 94 Resp: 16 SpO2: 95 % O2 Device: None (Room air)    Vitals:    12/24/17 0600 12/25/17 0414 12/26/17 0554   Weight: 58.3 kg (128 lb 8.5 oz) 62.3 kg (137 lb 5.6 oz) 63.5 kg (139 lb 15.9 oz)     Vital Signs with Ranges  Temp:  [97.3  F (36.3  C)-98.4  F (36.9  C)] 98.4  F (36.9  C)  Pulse:  [87-88] 88  Heart Rate:  [91-94] 94  Resp:  [16-20] 16  BP: (116-164)/(50-73) 139/64  SpO2:  [91 %-95 %] 95 %  I/O last 3 completed shifts:  In: 540  [P.O.:540]  Out: 3825 [Urine:3250; Stool:575]    Constitutional: Elderly white female, awake, alert, NAD, forgetful  Respiratory: BS vesicular bilaterally, no crackles or wheezing  Cardiovascular: S1 and S2, reg, with soft ESM murmur noted over the precordium  GI: Soft, non-distended, tenderness++ on palpation on right side, right sided colostomy in situ+, BS present+  Skin/Integumen: No rashes  Extremities: No pedal edema    Medications     NaCl 75 mL/hr at 12/25/17 0841       acetaminophen  1,000 mg Oral Q8H     pantoprazole  40 mg Oral QAM AC     sodium chloride (PF)  3 mL Intracatheter Q8H     enoxaparin  40 mg Subcutaneous Q24H       Data     Recent Labs  Lab 12/24/17  0655 12/22/17  0729 12/21/17  0611 12/20/17  0940   WBC  --   --  9.3 14.8*   HGB  --  8.8* 9.3* 11.5*   MCV  --   --  76* 74*    239 257 335   INR  --   --   --  1.02   NA  --   --  143 136   POTASSIUM  --   --  3.7 4.2   CHLORIDE  --   --  114* 106   CO2  --   --  21 22   BUN  --   --  15 18   CR  --  0.68 0.68 0.89   ANIONGAP  --   --  8 8   LUPE  --   --  8.0* 9.4   GLC  --  140* 75 120*   ALBUMIN  --   --   --  2.8*   PROTTOTAL  --   --   --  7.0   BILITOTAL  --   --   --  0.8   ALKPHOS  --   --   --  81   ALT  --   --   --  12   AST  --   --   --  16   LIPASE  --   --   --  233       No results found for this or any previous visit (from the past 24 hour(s)).

## 2017-12-26 NOTE — PLAN OF CARE
Problem: Patient Care Overview  Goal: Plan of Care/Patient Progress Review  Discharge Planner PT   Patient plan for discharge: TCU  Current status: Patient needing CGA/SBA and bedrail for supine to sit bed mobility and min A for LE's into bed; sit>stand with CGA and safety cues; gait x 125 feet with 1 standing rest break secondary to SOB; too fatigued for ex's this am after walking  Barriers to return to prior living situation: lives alone; current need for assist  Recommendations for discharge: TCU  Rationale for recommendations: maximize strength, activity tolerance, and independence with functional mobility       Entered by: Darling Sequeira 12/26/2017 9:42 AM

## 2017-12-26 NOTE — PROGRESS NOTES
COLON & RECTAL SURGERY PROGRESS NOTE      SUBJECTIVE:    Feeling well         OBJECTIVE:  Temp:  [97.3  F (36.3  C)-98.4  F (36.9  C)] 97.8  F (36.6  C)  Pulse:  [87] 87  Heart Rate:  [91-94] 94  Resp:  [16-20] 16  BP: (129-164)/(55-73) 164/55  SpO2:  [91 %-95 %] 93 %      Intake/Output Summary (Last 24 hours) at 12/25/17 0833  Last data filed at 12/25/17 0438   Gross per 24 hour   Intake              600 ml   Output             1925 ml   Net            -1325 ml       Physical Exam:  GEN: NAD, intermittently confused but easily re-orientable  Abd: soft, ND, minimally ttp, stoma bag with gas and stool      LABS:   Lab Results   Component Value Date    WBC 9.3 12/21/2017    HGB 8.8 (L) 12/22/2017    HCT 31.0 (L) 12/21/2017     12/24/2017     12/21/2017    POTASSIUM 3.7 12/21/2017    CHLORIDE 114 (H) 12/21/2017    CO2 21 12/21/2017    BUN 15 12/21/2017    CR 0.68 12/22/2017     (H) 12/22/2017    TROPI 0.026 12/06/2017    AST 16 12/20/2017    ALT 12 12/20/2017    ALKPHOS 81 12/20/2017    BILITOTAL 0.8 12/20/2017    INR 1.02 12/20/2017       ASSESSMENT/PLAN: 89F POD# 5 status post lap transverse loop colostomy.       Low fiber diet  All oral meds  dispo per primary      Ted Wang MD  CRS fellow  [phone]    CRS Staff  Seen and examined.  Agree with above.  Awaiting biopsies.  OK to d/c once TCU spot secured.    Keenan Rivero MD  Colorectal Surgery  [phone] (office)  [phone] (pager)  [email or web address]

## 2017-12-26 NOTE — PROGRESS NOTES
"[facility] Nurse Inpatient Ostomy Assessment      Initial Assessment of ostomy and needs for:   RUQ New transverse loop Colostomy      Data:   History:   Per MD note(s):   Mary Grace Rivers is a 89-year-old woman who was admitted to the hospital yesterday with nausea, vomiting and abdominal pain.  She had been on antibiotics for urinary tract infection, but had progressive discomfort prompting her to be seen in the emergency room.  CT scan demonstrated dilated ascending and transverse colon with some dilatation of the small bowel and what appeared to be obstructing mass at the distal transverse colon.  There was an additional mass noted in your right colon that did not appear to be obstructing.  There were several indeterminate lesions in the liver concerning for possible metastasis.  She was passing a very small amount of gas, but no bowel movement for several days and had progressive abdominal pain.  For these reasons, recommended an exploratory laparoscopy with possible subtotal colectomy versus ileostomy or colostomy.  She in conjunction with daughter, Mili, also had a chance to go over the risks including bleeding and infection, both superficial and deep, possible anastomotic leak if one were created and other risks associated with major abdominal surgery and general anesthesia including but not limited to DVT, PE, heart attack, stroke and urinary tract infection, blood clots, damage to surrounding structures and even death.  We discussed that there might be more lesions or more advanced disease.  She was briefed on the need for stoma.  She understood and wished to proceed.      Type of ostomy:   RUQ Colostomy  Stoma assessment:   ? 2\"  slight oval, viable, 40% red moist mucosa  / 60% slough , moist, edematous and protruberant, Stoma sits in small bowl  Mucocutaneous Junction (MCJ):  Intact with sutures  Peristomal skin:  Intact, no erythema    Abdominal assessment: soft but " distended    Output:  Small amt mushy to liquid  brown fecal output        I/O last 3 completed shifts:  In: 1595 [P.O.:1020; I.V.:575]  Out: 3000 [Urine:2450; Stool:550]    Current pouching system:  Garrison NI flat cut to fit with ring and drainable pouch.  Leakage under barrier     Pain: some discomfort with appliance removal       Diet:       Active Diet Order      Low Fiber Diet    Labs:   Recent Labs   Lab Test  12/22/17   0729  12/21/17   0611  12/20/17   0940   ALBUMIN   --    --   2.8*   HGB  8.8*  9.3*  11.5*   INR   --    --   1.02   WBC   --   9.3  14.8*             Intervention:     Patient's chart evaluated.      Assessments done today:  Procedure, Abdomen, stoma, pouching    Prosthetic refitting: Change to [place]arrison NI convex 70mm cut to fit with closed pouch      Education:Pouch:  Colostomy management and appliance changes.     Prepared for discharge by: AVS completed.            Assessment:     Stoma assessment: viable, healthy, beefy red, moist, edematous and protuberant.       Learning needs/ comprehension: Colostomy management and emptying    Effectiveness of current pouching/ supply plan: Switched to closed for ease of management.         Plan:     Plan:   ? Current pouching system: Garrison NI High output with Barrier ring and closed pouch    Education:  ? Education completed:  Pts son present today. Pt really not engaged in learning. Demo appliance change. Son listened but no hands on assist.    ? Educational needs: Pouch Emptying and Pouch Replacement, Use of clamp, Use of tape, How to empty pouch, Removal of pouch, Preparation of new pouch, Cutting out or evaluating a pattern, Applying paste or rings, Applying appliance to abdomen, Peristomal skin care, Diet and hydration / fluid balance and Odor / flatus management, Lifestyle adjustment changes  ? Continue to prepare for discharge: AVS completed. Supplies @ bedside  o Supply company: TBD   o Do [facility] Nurses recommend home care? TCU     Face  to face time:  30    EDU 15minutes

## 2017-12-26 NOTE — PROGRESS NOTES
SW:    I: GEOVANNY following for discharge planning. [facility] is able to clinically accept pending insurance verification. Referrals also out of [facility] and [facility], awaiting response as to whether these facilities are able to accept. Per hospitalist note on 12/25, anticipate discharge today vs tomorrow.    P: SW to follow.    ADDENDUM: GEOVANNY spoke with pt daughter, Mili [phone], this morning to discuss pt insurance. Pt currently has a Medicare that is managed by [employer], which was established while pt was living and receiving medical care in [place]. Per pt daughter, pt will be covered under a [employer] plan beginning 1/1/18. GEOVANNY discussed that facilities in [place] are out-of-network for her current plan and therefore the pt would be responsible for a large portion of the TCU stay until 1/1/18. Pt daughter plans to contact pt current insurance provider as she believes that there may be an exception due to the circumstances where the insurance approves a typically out-of-network facility. GEOVANNY encouraged her to do so and informed her that the facilities will also be looking into this for confirmation. [facility] would be able to clinically accept however would need to determine the financial piece.    1445: GEOVANNY spoke with pt daughter again and updated that [facility] is able to accept however they need to obtain prior authorization from her [employer] Medicare plan (which is an [employer] plan). Pt daughter aware that all [place] facilities are out-of-network with this insurance and that pt very well may be responsible for a large percentage of the TCU bill until her new insurance begins. Pt daughter has contacted pt new insurance through [employer] to ensure that this plan is ready to begin 1/1/18. Lillian to update this writer as able re: insurance auth. [facility] does not accept [employer] and [facility] does not have bed availability, pt daughter agreeable to moving forward with placement at [facility].    LELA Cope, [facility]  Daytime  (8:00am-4:30pm): [phone]  After-Hours  Pager (4:30pm-11:30pm): [phone]

## 2017-12-27 NOTE — PROGRESS NOTES
"[facility] Nurse Inpatient Ostomy Assessment      Assessment of ostomy and needs for:   RUQ New transverse loop Colostomy      Data:   History:   Per MD note(s):   Mary Grace Rivers is a 89-year-old woman who was admitted to the hospital yesterday with nausea, vomiting and abdominal pain.  She had been on antibiotics for urinary tract infection, but had progressive discomfort prompting her to be seen in the emergency room.  CT scan demonstrated dilated ascending and transverse colon with some dilatation of the small bowel and what appeared to be obstructing mass at the distal transverse colon.  There was an additional mass noted in your right colon that did not appear to be obstructing.  There were several indeterminate lesions in the liver concerning for possible metastasis.  She was passing a very small amount of gas, but no bowel movement for several days and had progressive abdominal pain.  For these reasons, recommended an exploratory laparoscopy with possible subtotal colectomy versus ileostomy or colostomy.  She in conjunction with daughter, Mili, also had a chance to go over the risks including bleeding and infection, both superficial and deep, possible anastomotic leak if one were created and other risks associated with major abdominal surgery and general anesthesia including but not limited to DVT, PE, heart attack, stroke and urinary tract infection, blood clots, damage to surrounding structures and even death.  We discussed that there might be more lesions or more advanced disease.  She was briefed on the need for stoma.  She understood and wished to proceed.      Type of ostomy:   RUQ Colostomy  Stoma assessment:   ? 2\"  slight oval, viable, 40% red moist mucosa  / 60% slough , moist, edematous and protruberant, Stoma sits in small bowl  Mucocutaneous Junction (MCJ):  12-26: Intact with sutures  Peristomal skin: 12-26: Intact, no erythema    Abdominal assessment: soft but " distended    Output:  Small amt mushy to liquid  brown fecal output  I/O last 3 completed shifts:  In: 490 [P.O.:490]  Out: 4440 [Urine:3975; Stool:465]      Current pouching system:  [place] NI flat cut to fit with ring and previously a drainable pouch but now closed pouch.  Pt's son states pouch apparently came apart last night around 2am and there was 'a big mess.'  Current pouch is a closed pouch now and barrier and pouch are both clean and intact.       Pain: has had some discomfort with appliance removal       Diet:       Active Diet Order      Low Fiber Diet    Labs:   Recent Labs   Lab Test  12/22/17   0729  12/21/17   0611  12/20/17   0940   ALBUMIN   --    --   2.8*   HGB  8.8*  9.3*  11.5*   INR   --    --   1.02   WBC   --   9.3  14.8*             Intervention:     Patient's chart evaluated.      Assessments done today:   Abdomen, stoma, pouching system    Prosthetic refitting: Change to Blu NI convex 70mm cut to fit with closed pouch      Education: continued basic review of cares    Prepared for discharge by: AVS completed; supplies in room for d/c           Assessment:     Stoma assessment: viable, healthy, beefy red, moist, edematous and protuberant.      Learning needs/ comprehension: Colostomy management and emptying.  Pt has done minimal involvement in cares so far.     Effectiveness of current pouching/ supply plan: Switched to closed for ease of management.         Plan:     Plan:   ? Current pouching system: 2pc [place] 70mm cut-to-fit convex barrier with Barrier ring and closed pouch.  Current pouch intact, no need to change today.  Supplies in room for d/c to TCU.      Education:  ? Education completed:  Pts son present today. Pt really not engaged in learning.  Son observed pouch change yesterday.   ? Educational needs: Pouch Emptying and Pouch Replacement, Use of clamp, Use of tape, How to empty pouch, Removal of pouch, Preparation of new pouch, Cutting out or evaluating a  pattern, Applying paste or rings, Applying appliance to abdomen, Peristomal skin care, Diet and hydration / fluid balance and Odor / flatus management, Lifestyle adjustment changes  ? Continue to prepare for discharge: AVS completed. Supplies @ bedside  o Supply company: [employer]   o Do [facility] Nurses recommend home care? TCU      Face to face time: 15 min assessment   EDU: additional 15 minutes   WOC will return:  Mon-Fri while pt in hospital.  Pt likely to d/c this afternoon; if stays until tomorrow WOC will see again and do another pouch change prn

## 2017-12-27 NOTE — PLAN OF CARE
Problem: Patient Care Overview  Goal: Plan of Care/Patient Progress Review  Occupational Therapy Discharge Summary    Reason for therapy discharge:    Discharged to transitional care facility.    Progress towards therapy goal(s). See goals on Care Plan in [facility] electronic health record for goal details.  Goals not met.  Barriers to achieving goals:   discharge on same date as initial evaluation.    Therapy recommendation(s):    Continued therapy is recommended.  Rationale/Recommendations:  to advance ADL performance and functional mob prior to return home.

## 2017-12-27 NOTE — PROGRESS NOTES
"COLON & RECTAL SURGERY  PROGRESS NOTE    December 27, 2017  Post-op Day # 5    SUBJECTIVE:  The patient states that her left knee hurts for the past 2 days after laying in bed. She states, \"It feels stiff.\" The patient is tired this morning because she hasn't slept well in the past 2 days. She is tolerating food without issues and having good gas and stool from colostomy.     OBJECTIVE:  Temp:  [97.9  F (36.6  C)-99  F (37.2  C)] 98  F (36.7  C)  Pulse:  [88-95] 91  Heart Rate:  [93-97] 93  Resp:  [16-18] 18  BP: (116-167)/(50-71) 150/61  SpO2:  [92 %-95 %] 92 %    Intake/Output Summary (Last 24 hours) at 12/27/17 0835  Last data filed at 12/27/17 0606   Gross per 24 hour   Intake              490 ml   Output             4090 ml   Net            -3600 ml       GENERAL:  Awake, alert, no acute distress,   HEAD - Nomocephalic atraumatic  SCLERA anicteric  EXTREMITIES warm and well perfused  ABDOMEN:  Soft, appropriately tender, non-distended, colostomy in opaque bag. Small amount of gas and liquid stool.   INCISION:  C/d/i,     LABS:  Lab Results   Component Value Date    WBC 9.3 12/21/2017     Lab Results   Component Value Date    HGB 8.8 12/22/2017     Lab Results   Component Value Date    HCT 31.0 12/21/2017     Lab Results   Component Value Date     12/27/2017     Last Basic Metabolic Panel:  Lab Results   Component Value Date     12/21/2017      Lab Results   Component Value Date    POTASSIUM 3.7 12/21/2017     Lab Results   Component Value Date    CHLORIDE 114 12/21/2017     Lab Results   Component Value Date    LUPE 8.0 12/21/2017     Lab Results   Component Value Date    CO2 21 12/21/2017     Lab Results   Component Value Date    BUN 15 12/21/2017     Lab Results   Component Value Date    CR 0.51 12/27/2017     Lab Results   Component Value Date     12/22/2017       ASSESSMENT/PLAN:POD#5 lap transverse loop colostomy for obstruction due to lesion at the splenic flexure with lesion in the " right colon and scattered peritoneal implants.   1. Low residue diet  2. PO PRN pain meds  3. Lovenox for prophylaxis. Will need 30 days post op  4. Dispo: Okay to d/c per colorectal surgery team. Awaiting insurance and TCU placement    Justa Awad PA-C  [facility]  Phone: [phone]

## 2017-12-27 NOTE — PLAN OF CARE
Problem: Patient Care Overview  Goal: Plan of Care/Patient Progress Review  A&O. VSS, on 2 L O2 as patient c/o shortness of breath at rest and was 85% O2 saturation on RA.  Tele SR with 1*AVB and BBB. Mod carb diet with nectar thick liquids, poor appetite and limited choices with thickened diet, ate some banana bread and grapes after much encouragement.  Blood sugar 120 @ HS, Dr. Tirado notified about lantus dose, okay to give the dose per MD. Up to chair and commode with assist of 2 & walker and gait belt, 2 small loose stools, had bowel meds per patient. Denies pain. Bumex drip, cha patent. Plan: video swallow test tomorrow, ? TAVR evaluation.

## 2017-12-27 NOTE — PLAN OF CARE
Problem: Patient Care Overview  Goal: Plan of Care/Patient Progress Review  A&O. VSS. Low fiber diet. Colostomy intact. Up to bathroom with assist of 1 and walker. Denies pain, scheduled Tylenol. Plan: TCU placement.

## 2017-12-27 NOTE — PLAN OF CARE
Problem: Patient Care Overview  Goal: Plan of Care/Patient Progress Review  OT eval completed and treatment initiated. Son present and confirmed baseline information. See OT eval for full report.  Discharge Planner OT   Patient plan for discharge: TCU  Current status: Pt required Mod A of 1 with toilet transfer, Max A with LE dressing, Mod A with supine to sit due to general weakness, and abdominal/stoma pain. Pt requiring full assist to manage colostomy bag. Tolerated minimal standing activity due to fatigue, R knee pain.   Barriers to return to prior living situation: current level of assist, lives alone  Recommendations for discharge: TCU  Rationale for recommendations: pt will benefit from daily therapies for strengthening to improve functional mobilities and for ADL retraining to improve independence with daily tasks including management of colostomy bag [facility]        Entered by: Carlos Mack 12/27/2017 12:33 PM

## 2017-12-27 NOTE — PROGRESS NOTES
SW:    I: GEOVANNY following for discharge planning. GEOVANNY spoke with pt son and provided update re: the need for insurance auth, understand that once this is received we can move forward with placement at [facility]. RN care coordinator spoke with Em at [facility] as needed more information, requesting that OT eval at hospital as need this to approve TCU stay, will fax as soon as eval completed. [facility] has also talked with kate today.    P: GEOVANNY to follow, hopeful that pending OT eval auth will be given and pt can discharge to [facility] this afternoon.    ADDENDUM: GEOVANNY faxed PT and OT documentation from today 12/27 to Em at [facility] ([phone]) to review. Will await approval. GEOVANNY updated pt son when he was here this morning as well as daughter Mili by phone this afternoon. SW to coordinate with Mili this afternoon and will call once plan known.    1420: TCU stay approved by pt insurance, will move forward with discharge today to [facility] at 1600. SW to fax discharge orders to [phone] when available. GEOVANNY updated pt daughter Mili by phone and she will plan to be at [facility] at 1600 for discharge.    PAS-RR    D: Per DHS regulation, GEOVANNY completed and submitted PAS-RR to [place] [employer] Direct Connect via the Senior LinkAge Line.  PAS-RR confirmation # is : [id number]    I: GEOVANNY spoke with pt and they are aware a PAS-RR has been submitted.  GEOVANNY reviewed with pt that they may be contacted for a follow up appointment within 10 days of hospital discharge if their SNF stay is < 30 days.  Contact information for Senior [employer] Line was also provided.    A: Pt verbalized understanding.    P: Further questions may be directed to Senior [employer] Line at #[phone], option #4 for PAS-RR staff.    1530: Discharge orders faxed to facility admissions.    LELA Cope, [facility] Hospital[facility]  Daytime (8:00am-4:30pm): [phone]  After-Hours GEOVANNY Pager (4:30pm-11:30pm): [phone]

## 2017-12-27 NOTE — PLAN OF CARE
Problem: Pain, Acute (Adult)  Goal: Identify Related Risk Factors and Signs and Symptoms  Related risk factors and signs and symptoms are identified upon initiation of Human Response Clinical Practice Guideline (CPG).   Outcome: Improving    Discharge to [facility] at 1600. Pt's daughter is here to help with transfer.

## 2017-12-27 NOTE — PROGRESS NOTES
" 12/27/17 1210   Quick Adds   Type of Visit Initial Occupational Therapy Evaluation   Living Environment   Lives With alone   Living Arrangements apartment   Home Accessibility no concerns   Self-Care   Dominant Hand right   Usual Activity Tolerance good   Current Activity Tolerance poor   Regular Exercise yes   Activity/Exercise Type strength training   Exercise Amount/Frequency 5-7 times/wk   Equipment Currently Used at Home walker, rolling;grab bar;shower chair   Functional Level Prior   Ambulation 1-->assistive equipment   Transferring 1-->assistive equipment   Toileting 1-->assistive equipment   Bathing 1-->assistive equipment   Dressing 0-->independent   Eating 0-->independent   Communication 0-->understands/communicates without difficulty   Swallowing 0-->swallows foods/liquids without difficulty   Cognition 0 - no cognition issues reported   Fall history within last six months yes   Number of times patient has fallen within last six months 2   Prior Functional Level Comment Son present who confirmed info. Pt takes 1meal per day most days in community dining area and makes simple meals on own. She is indep med mgmt, bathing, laundry. She just moved to apartment so has not yet had to clean, son said facility will provide assist and pt stated \"no , I will do on my own.\" Family does finances, transport and assists as needed.   General Information   Onset of Illness/Injury or Date of Surgery - Date 12/21/17   Referring Physician Dr. Alarcon   Patient/Family Goals Statement TCU prior to return home   Additional Occupational Profile Info/Pertinent History of Current Problem 89yo female post op lap colostomy d/t colon mass   Precautions/Limitations fall precautions;abdominal precautions   Cognitive Status Examination   Orientation orientation to person, place and time   Level of Consciousness alert   Able to Follow Commands success, 1-step commands;success, 2-step commands   Personal Safety (Cognitive) WNL/WFL "   Cognitive Comment not fully assessed   Visual Perception   Visual Perception No deficits were identified;Wears glasses   Pain Assessment   Patient Currently in Pain Yes, see Vital Sign flowsheet   Range of Motion (ROM)   ROM Quick Adds (BUE WFL)   Strength   Strength Comments B yifan 4/5   Mobility   Bed Mobility Comments Sit to supine Mod A of 1   Transfer Skill: Bed to Chair/Chair to Bed   Level of [place]: Bed to Chair minimum assist (75% patients effort)   Physical Assist/Nonphysical Assist: Bed to Chair 1 person assist   Weight-Bearing Restrictions full weight-bearing   Assistive Device - Transfer Skill Bed to Chair Chair to Bed Rehab Eval rolling walker   Transfer Skill: Sit to Stand   Level of [place]: Sit/Stand contact guard  (from chair)   Physical Assist/Nonphysical Assist: Sit/Stand verbal cues   Assistive Device for Transfer: Sit/Stand rolling walker   Toilet Transfer   Toilet Transfer Comments low stool (pt reports hers higher in apt), required Mod A for sit to stand   Balance   Balance Comments CGA amb in room with walker/short distance   Upper Body Dressing   Level of [place]: Dress Upper Body moderate assist (50% patients effort)   Physical Assist/Nonphysical Assist: Dress Upper Body 1 person assist   Lower Body Dressing   Level of [place]: Dress Lower Body moderate assist (50% patients effort)   Physical Assist/Nonphysical Assist: Dress Lower Body 1 person assist   Grooming   Level of [place]: Grooming other (see comments)   Physical Assist/Nonphysical Assist: Grooming (set-up seated, increased effort)   Activities of Daily Living Analysis   Impairments Contributing to Impaired Activities of Daily Living balance impaired;pain;post surgical precautions;strength decreased   General Therapy Interventions   Planned Therapy Interventions ADL retraining;strengthening;transfer training;progressive activity/exercise   Clinical Impression   Criteria for Skilled Therapeutic  "Interventions Met yes, treatment indicated   OT Diagnosis decreased ADL performance   Influenced by the following impairments pain, weakness, balance   Assessment of Occupational Performance 3-5 Performance Deficits   Identified Performance Deficits dressing, toileting, bathing, household mgmt, functional mobility   Clinical Decision Making (Complexity) Moderate complexity   Therapy Frequency daily   Predicted Duration of Therapy Intervention (days/wks) 3 days   Anticipated Discharge Disposition Transitional Care Facility   Risks and Benefits of Treatment have been explained. Yes   Patient, Family & other staff in agreement with plan of care Yes   [facility] \"6 Clicks\"   2016, Trustees of [facility], under license to [employer].  All rights reserved.   6 Clicks Short Forms Daily Activity Inpatient Short Form   [facility]  \"6 Clicks\" Daily Activity Inpatient Short Form   1. Putting on and taking off regular lower body clothing? 2 - A Lot   2. Bathing (including washing, rinsing, drying)? 2 - A Lot   3. Toileting, which includes using toilet, bedpan or urinal? 3 - A Little   4. Putting on and taking off regular upper body clothing? 3 - A Little   5. Taking care of personal grooming such as brushing teeth? 3 - A Little   6. Eating meals? 4 - None   Daily Activity Raw Score (Score out of 24.Lower scores equate to lower levels of function) 17   Total Evaluation Time   Total Evaluation Time (Minutes) 15     "

## 2017-12-27 NOTE — DISCHARGE INSTRUCTIONS
"New RUQ Transverse Loop  Colostomy:   1. Pt will need teaching on Colostomy appliance change and emptying   2. Supplies with patient       1.Change barrier  2x/wk and prn   2. Empty or change closed pouch when 1/3 full of stool/gas  3. Will not harm appliance to get wet during bathing.        Blow dry (on cool setting) cloth tape and backing after bath or shower  4. Iniitally Eat 6 small meals/day. No dietary restrictions related to colostomy  5. Drink plenty of fluids  6. Always carry and emergency appliance system  7. For best results use a odor eliminator (Febreeze) in your bathroom prior to emptying/changing the appliance  8. No heavy lifting, no sit-ups but walk.  9. OK to take steps.       Supplies:  Blu New image 2pc: 20 pouch changes/month  1. Barrier: flextend, convex  cut to fit with tape                           #00507  5/bx = 2-4bx/month  2. Pouch: Opaque lock n roll                                                    #04148 10/bx = 1-2bx/month OR  3. Pouch: Closed opaque no  filter                                            #[id number] 30/bx = 1-2bx/month  4. Optional if leakage:  Adapt ring                                             #7805   10/bx = 1-2bx/month      Procedure for appliance change:  1. Draw and cut opening in barrier (If cut to fit)  following pattern, approx 2\"  2. Remove plastic backing  3. Optional if leakage:  Open ring. Stretch to approx size of opening in pouch.   4.  Optional: Place full ring around opening on sticky side of pouch. Press into place  5. Fold back edge of tape to create a \"tab\" This assists with paper removal in Step #11  6 Set barrier aside  7. Gently remove pouching system from around stoma. Discard.  8. Clean skin around stoma with disposable wipe or moist gauze. Discard.  9. Pat the skin dry  10. Center stoma in barrier opening. Press barrier to skin  11. Pull on \"tabs\" to remove paper that covers the tape. Press tape to skin  12. Close lock n roll closure on " drainable pouch.  If using closed pouch it can be changed up to 2x/day      Call for any ?/concerns:  Mirtha FOSTER (ostomy nurses) @ [facility]  (C) [phone]  (W) [phone]                You are being discharged to a transitional care unit:  Lillian Jesus   [address]  [place] [address]  Phone: [phone]

## 2017-12-27 NOTE — DISCHARGE SUMMARY
[facility]    Discharge Summary  Hospitalist    Date of Admission:  12/20/2017  Date of Discharge:  12/27/2017  4:34 PM  Discharging Provider: Belen Richards  Date of Service (when I saw the patient): 12/27/17    Discharge Diagnoses   Colonic Obstruction s/p exploratory laparoscopy with loop transverse colostomy with biopsy  Acute hypoxic respiratory failure- resolved  Acute blood loss anemia  Severe malnutrition  HTN  GERD    History of Present Illness   89 year old female, with PmHx of depression, hypertension, UTI, colon polyp and history of breast cancer, who presented on 12/20/2017 with abdominal distension, nausea and vomiting post meals and constipation for 4 days; she was found to have a colonic obstruction at the splenic flexure, as per CT abdomen/pelvis; for a detailed HPI- please refer to H&P done by Dr Ryan Carter on 12/20/2017.    Hospital Course   Mary Grace Rivers was admitted on 12/20/2017.  The following problems were addressed during her hospitalization:    1. S/p Exploratory laparoscopy, with loop transverse colostomy with biopsy on 12/21/2017  - she presented with abdominal distention, nausea, vomiting and constipation.  - Workup done on admission -CT abd/pelvis on 12/20/17 revealed, colonic obstruction at the splenic flexure. An underlying mass lesion cannot be excluded. Sigmoid/descending colonic diverticulosis, without CT evidence of diverticulitis. Hepatic several small hypodensities, which due to their ill-defined nature are indeterminate and could represent small hepatic metastasis. Left lower lobe pulmonary nonspecific 0.4 cm nodule on the uppermost image. Bilateral renal cysts, two of which appear complex  - CMP significant for Alb 2.8. CBC revealed, Hb 11.5, WBC 14.8 and Plts 335. INR was 1.02. Lipase was normal. EKG on 12/20/17 revealed NSR rate 81/min. C. Chest x-rays on 12/20/17 was normal. EKG done on 12/21/17 revealed, NSR rate 93/min, with occasional PVCs.   -  based on CT abdomen findings- she was taken to OR and underwent the above procedure; post op she had been followed by CR service; initially, she had a NGT in place which eventually was removed and she was started on clear liquids, then advanced to full liquids diet-->low fiber diet now;   - was having intermittent abdominal pain related to passing gas --> started on Simethicone prn on 12/25 which seems to have helped; tried to avoid narcotics since it seems that cause her significant hypoxia  - she was initially on Cipro/Flagyl- now off ABX  - she was seen by [facility] nurse who provided helpful informations regarding ostomy care  - pathology report still pending  - PT eval done and she will be d/c to TCU    2. Acute hypoxic respiratory failure- resolved  - Echo ECHO done on 12/21/17 revealed, moderate to severe concentric LV hypertrophy, with hyperdynamic systolic function. Visually, LVEF is estimated at 70-75%. No evidence of dynamic mitral regurgitation. Normal RV size with hyperdynamic systolic function. Trileaflet aortic valve sclerosis, without hemodynamically significant stenosis. Mild tricuspid regurgitation;    - post op- she was on HFNC- that gradually improved down to 2L and then weaned off O2 completely  - Chest x-rays on 12/22/17 did not show any airspace consolidation, pneumothorax or pleural effusion. Heart size is normal.  - hypoxia likely related to Dilaudid use- so tried to avoid narcotics  - off iv fluids  - IS prn.       3. Hypertension: controlled.   - PTA Amlodipine-Olmesartan was on hold during hospitalization but it will be resumed after d/c.       4. Acute blood loss anemia: due to recent abdominal surgery and hemodilution.   Hb 11.5-->9.3-->8.8g/dl on 12/22/17     5. Severe malnutrition: this is due to underlying illness. Appreciate Nutritionist input.    Belen Richards MD    Significant Results and Procedures      EXPLORATORY LAPAROSCOPY WITH LOOP TRANSVERSE COLOSTOMY WITH BIOPSY on  12/22/2017    Pending Results   These results will be followed up by CR service  Unresulted Labs Ordered in the Past 30 Days of this Admission     Date and Time Order Name Status Description    12/21/2017 2139 Surgical pathology exam In process           Code Status   Full Code       Primary Care Physician   Provider Not In System    Physical Exam                      Vitals:    12/25/17 0414 12/26/17 0554 12/27/17 0605   Weight: 62.3 kg (137 lb 5.6 oz) 63.5 kg (139 lb 15.9 oz) 60.5 kg (133 lb 6.1 oz)     Vital Signs with Ranges  Temp:  [98.5  F (36.9  C)] 98.5  F (36.9  C)  Pulse:  [94] 94  Resp:  [18] 18  BP: (149)/(68) 149/68  SpO2:  [96 %] 96 %  I/O last 3 completed shifts:  In: 1725 [P.O.:1150; I.V.:575]  Out: 7140 [Urine:6125; Stool:1015]    Constitutional: Elderly white female, awake, alert, NAD, forgetful  Respiratory: BS vesicular bilaterally, no crackles or wheezing  Cardiovascular: S1 and S2, reg, with soft ESM murmur noted over the precordium  GI: Soft, non-distended, nontender, right sided colostomy in situ+, BS present+  Skin/Integumen: No rashes  Extremities: No pedal edema    Discharge Disposition   Discharged to short-term care facility  Condition at discharge: Satisfactory    Consultations This Hospital Stay   COLORECTAL SURGERY IP CONSULT  WOUND OSTOMY CONTINENCE NURSE  IP CONSULT  WOUND OSTOMY CONTINENCE NURSE  IP CONSULT  WOUND OSTOMY CONTINENCE NURSE  IP CONSULT  NUTRITION SERVICES ADULT IP CONSULT  PHYSICAL THERAPY ADULT IP CONSULT  SOCIAL WORK IP CONSULT  OCCUPATIONAL THERAPY ADULT IP CONSULT  PHYSICAL THERAPY ADULT IP CONSULT  OCCUPATIONAL THERAPY ADULT IP CONSULT    Time Spent on this Encounter   I, Belen Richards, personally saw the patient today and spent greater than 30 minutes discharging this patient.    Discharge Orders     Follow Up and recommended labs and tests   Follow up in the office in 3-4 weeks with Dr. Alarcon or at your already scheduled post op visit. Call [phone]  to schedule your appointment. Call the office at [phone] if you develop fever, uncontrolled pain, bleeding, nausea, vomiting, or constipation.     General info for SNF   Length of Stay Estimate: Short Term Care: Estimated # of Days <30  Condition at Discharge: Improving  Level of care:skilled   Rehabilitation Potential: Good  Admission H&P remains valid and up-to-date: Yes  Recent Chemotherapy: N/A  Use Nursing Home Standing Orders: Yes     Mantoux instructions   Give two-step Mantoux (PPD) Per Facility Policy Yes     Reason for your hospital stay   Exploratory laparoscopy with loop transverse colostomy and biopsy.     Daily weights   Call Provider for weight gain of more than 2 pounds per day or 5 pounds per week.     Follow Up and recommended labs and tests   Follow up with Nursing home physician in 2-3 days  Follow up with primary care provider after discharge home  Follow up Surgery as recommended.     Activity - Up with nursing assistance     Full Code     Physical Therapy Adult Consult   Evaluate and treat as clinically indicated.    Reason:  Deconditioning     Occupational Therapy Adult Consult   Evaluate and treat as clinically indicated.    Reason:  Deconditioning     Advance Diet as Tolerated   Follow this diet upon discharge: Low residue       Discharge Medications   Current Discharge Medication List      START taking these medications    Details   enoxaparin (LOVENOX) 40 MG/0.4ML injection Inject 0.4 mLs (40 mg) Subcutaneous every 24 hours  Qty: 25 Syringe, Refills: 0    Associated Diagnoses: Malignant neoplasm of splenic flexure (H)      simethicone (MYLICON) 80 MG chewable tablet Take 1 tablet (80 mg) by mouth every 6 hours as needed for cramping  Qty: 180 tablet    Associated Diagnoses: Flatulence, eructation and gas pain         CONTINUE these medications which have NOT CHANGED    Details   Amlodipine-Olmesartan (MOSES) 10-40 MG TABS Take 1 tablet by mouth daily      Acetaminophen (TYLENOL PO)  Take 325-650 mg by mouth every 4 hours as needed for mild pain or fever         STOP taking these medications       polyethylene glycol (MIRALAX/GLYCOLAX) Packet Comments:   Reason for Stopping:         bisacodyl (DULCOLAX) 10 MG Suppository Comments:   Reason for Stopping:             Allergies   Allergies   Allergen Reactions     Penicillins      Data   Most Recent 3 CBC's:  Recent Labs   Lab Test  12/27/17   0729  12/24/17   0655  12/22/17   0729  12/21/17   0611  12/20/17   0940  12/06/17   0350   WBC   --    --    --   9.3  14.8*  7.5   HGB   --    --   8.8*  9.3*  11.5*  10.4*   MCV   --    --    --   76*  74*  78   PLT  280  313  239  257  335  292      Most Recent 3 BMP's:  Recent Labs   Lab Test  12/27/17   0729  12/22/17   0729  12/21/17   0611  12/20/17   0940  12/06/17   0350   NA   --    --   143  136  143   POTASSIUM   --    --   3.7  4.2  3.8   CHLORIDE   --    --   114*  106  110*   CO2   --    --   21  22  23   BUN   --    --   15  18  13   CR  0.51*  0.68  0.68  0.89  0.72   ANIONGAP   --    --   8  8  10   LUPE   --    --   8.0*  9.4  8.4*   GLC   --   140*  75  120*  95     Most Recent 2 LFT's:  Recent Labs   Lab Test  12/20/17   0940  12/06/17   0350   AST  16  10   ALT  12  15   ALKPHOS  81  90   BILITOTAL  0.8  0.2     Most Recent INR's and Anticoagulation Dosing History:  Anticoagulation Dose History     Recent Dosing and Labs Latest Ref Rng & Units 12/20/2017    INR 0.86 - 1.14 1.02        Most Recent 3 Troponin's:  Recent Labs   Lab Test  12/06/17   0550  12/06/17   0350   TROPI  0.026  0.021     Most Recent Cholesterol Panel:No lab results found.  Most Recent 6 Bacteria Isolates From Any Culture (See EPIC Reports for Culture Details):No lab results found.  Most Recent TSH, T4 and A1c Labs:No lab results found.  Results for orders placed or performed during the hospital encounter of 12/20/17   XR Chest 2 Views    Narrative    CHEST TWO VIEWS  12/20/2017 10:52 AM    HISTORY:  Chest and  abdominal pain.    COMPARISON:  12/6/2017      Impression    IMPRESSION:  No infiltrates or acute process. Heart and pulmonary  vasculature within normal limits. Aortic calcification. Surgical clips  project over the lower lateral left hemithorax.     JAZZMINE BELTRAN MD   CT Abdomen Pelvis w Contrast    Addendum: 12/20/2017    KINJAL REYES  ACCESSION #[id number]    ADDENDUM: In addition to the findings below, there is a 4.0 cm mass in  the ascending colon on the right best seen on coronal series 4 image  20. I spoke with the PA taking care of this patient at 1720 regarding  these findings.    ROSA COLILNS M.D.  Date of Addendum: 12/20/2017    DASHA ARCE MD      Narrative    CT ABDOMEN/PELVIS WITH CONTRAST December 20, 2017 10:42 AM     HISTORY: Distended tender abdomen with vomiting.     CONTRAST DOSE: 78 mL [id number]    TECHNIQUE: Radiation dose for this scan was reduced using automated  exposure control, adjustment of the mA and/or kV according to patient  size, or iterative reconstruction technique.    FINDINGS: Calcified granulomas are noted at the lung bases. However,  the upper most image, there is a nonspecific 0.4 cm left lower lobe  pulmonary nodule. Multiple hepatic hypodensities are noted. Several  these are ill-defined and therefore indeterminate. Intrahepatic  biliary prominence is noted which may be related to  postcholecystectomy state. Multiple bilateral renal cysts are noted.  Thin rim of calcification is noted within a right superior pole cyst.  Hyperdense cyst is noted in the left renal lower pole. A  nonobstructing stone is also noted within the left mid pole. There is  marked fluid dilatation of the colon extending from the cecum to the  splenic flexure suggesting colonic obstruction. Descending and sigmoid  colon are decompressed. No free peritoneal fluid or air is  demonstrated. Sigmoid diverticulosis is noted without adjacent  inflammatory stranding to clearly indicate diverticulitis.       Impression    IMPRESSION:  1. Colonic obstruction at the splenic flexure. An underlying mass  lesion cannot be excluded.  2. Sigmoid/descending colonic diverticulosis without CT evidence of  diverticulitis.  3. Hepatic several small hypodensities which due to their ill-defined  nature are indeterminate and could represent small hepatic metastasis.  4. Left lower lobe pulmonary nonspecific 0.4 cm nodule on the  uppermost image.  5. Bilateral renal cysts, two of which appear complex.    ROSA COLLINS MD   XR Chest Port 1 View    Narrative    CHEST ONE VIEW PORTABLE   12/22/2017 1:08 PM     HISTORY:  Hypoxic respiratory failure, post transverse loop colostomy.      COMPARISON: 12/20/2017.      Impression    IMPRESSION: Enteric catheter extends to the distal esophagus. The  distal tip is not clearly seen. No airspace consolidation,  pneumothorax, or pleural effusion. Heart size similar to prior.    WAQAS SALGADO MD

## 2017-12-27 NOTE — PLAN OF CARE
Problem: Patient Care Overview  Goal: Plan of Care/Patient Progress Review  Outcome: No Change  A+Ox4. VSS except HTN. Given hydralazine x1 for SBP in 160s. Corrected SBP in 140s. Colostomy intact, stoma red protruding. Voiding frequency. Up with assist x1 and walker. Left knee swollen, painful. Scheduled tylenol for pain. Low fiber diet. +BS. +Gas.

## 2017-12-27 NOTE — PLAN OF CARE
Problem: Patient Care Overview  Goal: Plan of Care/Patient Progress Review  Patient plan for discharge: TCU  Current status: Supine to sit via logroll with HOB slightly elevated and use of bed rail with SBA and increased time. Sit to/from stand with FWW and CGA. Pt able to take a few steps to turn and sit in chair at bedside. Pt agreeable to perform seated LE exs and tolerates moderately due to reports of abdominal pain and L knee pain. Pt remained sitting up in chair with all needs in reach and chair alarm on awaiting OT arrival.  Barriers to return to prior living situation: lives alone; current need for assist  Recommendations for discharge: TCU per the plan established by the Physical Therapist   Rationale for recommendations: maximize strength, activity tolerance, and independence with functional mobility    Pt discharging to TCU today.    PT goals not met.

## 2017-12-28 PROBLEM — I10 ESSENTIAL HYPERTENSION: Status: ACTIVE | Noted: 2017-01-01

## 2017-12-28 NOTE — PROGRESS NOTES
[place] [facility]  PRIMARY CARE PROVIDER AND CLINIC:  System, Provider Not In    Chief Complaint   Patient presents with     Hospital F/U       HPI:    Mary Grace Rivers is a 89 year old  (6/14/1925),admitted to the  TCU from [facility].  Hospital stay 12/20/2017 through 12/27/2017.  Admitted to this facility for  rehab, medical management and nursing care.  HPI information obtained from: patient report and [facility] chart review.        Hospital Assessment & Plan     89 year old female, with PmHx of depression, hypertension, UTI, colon polyp and history of breast cancer, who presented on 12/20/2017 with abdominal distension, nausea and vomiting post meals and constipation for 4 days. Workup done on 12/20/17 revealed, CMP significant for Alb 2.8. CBC revealed, Hb 11.5, WBC 14.8 and Plts 335. INR was 1.02. Lipase was normal. EKG on 12/20/17 revealed NSR rate 81/min. CT abd/pelvis on 12/20/17 revealed, colonic obstruction at the splenic flexure. An underlying mass lesion cannot be excluded. Sigmoid/descending colonic diverticulosis, without CT evidence of diverticulitis. Hepatic several small hypodensities, which due to their ill-defined nature are indeterminate and could represent small hepatic metastasis. Left lower lobe pulmonary nonspecific 0.4 cm nodule on the uppermost image. Bilateral renal cysts, two of which appear complex. Chest x-rays on 12/20/17 was normal. EKG done on 12/21/17 revealed, NSR rate 93/min, with occasional PVCs. ECHO done on 12/21/17 revealed, moderate to severe concentric LV hypertrophy, with hyperdynamic systolic function. Visually, LVEF is estimated at 70-75%. No evidence of dynamic mitral regurgitation. Normal RV size with hyperdynamic systolic function. Trileaflet aortic valve sclerosis, without hemodynamically significant stenosis. Mild tricuspid regurgitation.      Hospital Course    1. S/p Exploratory laparoscopy, with loop  transverse colostomy with biopsy on 12/21/2017  - based on CT abdomen findings- she was taken to OR and underwent the above procedure; post op she had been followed by CR service; initially, she had a NGT in place which eventually was removed and she was started on clear liquids, then advanced to full liquids diet-->low fiber diet now;   - was having intermittent abdominal pain related to passing gas --> started on Simethicone prn on 12/25 and today seems better; tried to avoid narcotics sinec it seems that cause her significant hypoxia  - she was initially on Cipro/Flagyl- now off ABX  - she was seen by [facility] nurse who provided helpful informations regarding ostomy care  - pathology report still pending     2. Acute hypoxic respiratory failure- resolved  - Echo with normal EF as above  - was on HFNC- now improving- down to 2L yesterday and now sat well 91% on RA  - Chest x-rays on 12/22/17 did not show any airspace consolidation, pneumothorax or pleural effusion. Heart size is normal.  - likely related to Dilaudid use- so will try to minimize  - off iv fluids  - IS prn.       3. Hypertension: controlled.   - PTA Amlodipine-Olmesartan are on hold.  - IV hydralazine prn.      4. GERD prophylaxis: on protonix 40mg qd.       5. Acute blood loss anemia: due to recent abdominal surgery and hemodilution.   Hb 11.5-->9.3-->8.8g/dl on 12/22/17      6. Severe malnutrition: this is due to underlying illness. Appreciate Nutritionist input.  _________________________________    Current issues are:        Malignant neoplasm of splenic flexure (H)  Colonic mass  Colostomy in place  Diverticulosis of large intestine without hemorrhage  Pathology still pending. Tolerating an advanced diet. Denies abdominal pain, nausea, vomiting. Burping at times during  today's visit. Ostomy bag with liquid stool.     Severe protein-calorie malnutrition (H)  Thought related to condition. She reports to eating both breakfast and lunch but nursing  "cannot confirm. Albumin level on 12/20 was 2.8. GERD prophylaxis was d/c'd at discharge from hospital. Simethicone prn has not been used today.    Essential hypertension  Denies chest pain or shortness of breath. Moses daily. Weight stable.    Physical deconditioning  Pain of left lower leg  Unable to ambulate related to left knee pain and reports this started when \"she was recovering from surgery\". When attempted to stand today she could not tolerate. Left knee is swollen, no increased redness or warmth but when pressed for pain description she is showing that it radiates from groin down her leg. Had US in past 6/2015 for similar problem which was unremarkable.    Advance Care Planning  Evaluate for change to code status    CODE STATUS/ADVANCE DIRECTIVES DISCUSSION:   CPR/Full code   Patient's living condition: lives alone    ALLERGIES:Penicillins  PAST MEDICAL HISTORY:  has a past medical history of Cancer (H); Depressive disorder; and Hypertension.  PAST SURGICAL HISTORY:  has a past surgical history that includes Breast surgery; GYN surgery; Cholecystectomy; orthopedic surgery; and Laparoscopic assisted colostomy (N/A, 12/21/2017).  FAMILY HISTORY: family history is not on file.  SOCIAL HISTORY:  reports that she has quit smoking. She has never used smokeless tobacco. She reports that she does not drink alcohol or use illicit drugs.    Post Discharge Medication Reconciliation Status: discharge medications reconciled and changed, per note/orders (see AVS).  Current Outpatient Prescriptions   Medication Sig Dispense Refill     enoxaparin (LOVENOX) 40 MG/0.4ML injection Inject 0.4 mLs (40 mg) Subcutaneous every 24 hours 25 Syringe 0     simethicone (MYLICON) 80 MG chewable tablet Take 1 tablet (80 mg) by mouth every 6 hours as needed for cramping 180 tablet      Amlodipine-Olmesartan (MOSES) 10-40 MG TABS Take 1 tablet by mouth daily       Acetaminophen (TYLENOL PO) Take 650 mg by mouth 4 times daily AND may have 650 " "mg po once daily prn         ROS:  4 point ROS including Respiratory, CV, GI and , other than that noted in the HPI,  is negative    Exam:  /68  Pulse 94  Temp 98.5  F (36.9  C)  Resp 18  Ht 4' 10\" (1.473 m)  Wt 141 lb 9.6 oz (64.2 kg)  SpO2 96%  BMI 29.59 kg/m2  GENERAL APPEARANCE:  Alert, in no distress  ENT:  Mouth and posterior oropharynx normal, moist mucous membranes, [place]  EYES:  EOM, conjunctivae, lids, pupils and irises normal  RESP:  respiratory effort and palpation of chest normal, lungs clear to auscultation   CV:  Palpation and auscultation of heart done , regular rate and rhythm, no murmur, rub, or gallop, trace edema right leg and foot, +2 left knee, leg and foot  ABDOMEN:  normal bowel sounds, soft, nontender, ostomy bag in place, area is c/d/i  M/S:   Gait and station abnormal pain with weight bearing to LLE, dorsi/flex intact, +2 pedal pulses  SKIN:  Inspection of skin and subcutaneous tissue baseline  NEURO:   Examination of sensation by touch normal  PSYCH:  oriented x 2 but forgetful    Lab/Diagnostic data:    CT Abdomen Pelvis 12/20/17    FINDINGS: Calcified granulomas are noted at the lung bases. However,  the upper most image, there is a nonspecific 0.4 cm left lower lobe  pulmonary nodule. Multiple hepatic hypodensities are noted. Several  these are ill-defined and therefore indeterminate. Intrahepatic  biliary prominence is noted which may be related to  postcholecystectomy state. Multiple bilateral renal cysts are noted.  Thin rim of calcification is noted within a right superior pole cyst.  Hyperdense cyst is noted in the left renal lower pole. A  nonobstructing stone is also noted within the left mid pole. There is  marked fluid dilatation of the colon extending from the cecum to the  splenic flexure suggesting colonic obstruction. Descending and sigmoid  colon are decompressed. No free peritoneal fluid or air is  demonstrated. Sigmoid diverticulosis is noted without " adjacent  inflammatory stranding to clearly indicate diverticulitis.       IMPRESSION:  1. Colonic obstruction at the splenic flexure. An underlying mass  lesion cannot be excluded.  2. Sigmoid/descending colonic diverticulosis without CT evidence of  diverticulitis.  3. Hepatic several small hypodensities which due to their ill-defined  nature are indeterminate and could represent small hepatic metastasis.  4. Left lower lobe pulmonary nonspecific 0.4 cm nodule on the  uppermost image.  5. Bilateral renal cysts, two of which appear complex.    ADDENDUM: In addition to the findings below, there is a 4.0 cm mass in  the ascending colon on the right best seen on coronal series 4 image  20. I spoke with the PA taking care of this patient at 1720 regarding  these findings.    CBC RESULTS:   Recent Labs   Lab Test  12/27/17   0729 12/24/17   0655  12/22/17   0729  12/21/17   0611  12/20/17   0940   WBC   --    --    --   9.3  14.8*   RBC   --    --    --   4.07  5.00   HGB   --    --   8.8*  9.3*  11.5*   HCT   --    --    --   31.0*  36.8   MCV   --    --    --   76*  74*   MCH   --    --    --   22.9*  23.0*   MCHC   --    --    --   30.0*  31.3*   RDW   --    --    --   18.1*  17.9*   PLT  280  313  239  257  335       Last Basic Metabolic Panel:  Recent Labs   Lab Test  12/27/17 0729 12/22/17   0729  12/21/17   0611  12/20/17   0940   NA   --    --   143  136   POTASSIUM   --    --   3.7  4.2   CHLORIDE   --    --   114*  106   LUPE   --    --   8.0*  9.4   CO2   --    --   21  22   BUN   --    --   15  18   CR  0.51*  0.68  0.68  0.89   GLC   --   140*  75  120*       Liver Function Studies -   Recent Labs   Lab Test  12/20/17   0940  12/06/17   0350   PROTTOTAL  7.0  6.6*   ALBUMIN  2.8*  3.0*   BILITOTAL  0.8  0.2   ALKPHOS  81  90   AST  16  10   ALT  12  15       ASSESSMENT/PLAN:  (C18.5) Malignant neoplasm of splenic flexure (H)  (primary encounter diagnosis)  (K63.9) Colonic mass  (Z93.3) Colostomy in place  (H)  (K57.30) Diverticulosis of large intestine without hemorrhage  Comment: Acute and healing  Plan:   -Colostomy orders in place  -Low Residue diet as tolerated  -Simethicone 80 mg po q6H prn  -Enoxaparin 40 mg subcutaneous q24H  -Colorectal surgery f/u 1/18/18    (E43) Severe protein-calorie malnutrition (H)  Comment: Ongoing  Plan:   -Probable 2/2 bowel obstruction  -Ensure BID between meals  -Daily weights    (I10) Essential hypertension  Comment: Chronic  Plan:  -Amlodipine-Olmesartan (Ian 10-40 mg) po daily  -VS daily  -BMP    (R53.81) Physical deconditioning  (M79.662) Pain of left lower leg  Comment: Acute on Chronic  Plan:   -US to rule out DVT  -PT/OT   -Francis Hose on am and off HS  -CBC w/diff  -Consider ortho PA to evaluate    (Z71.89) Advanced directives, counseling/discussion  Comment: Ongoing  Plan:   -Call out to daughter. No advance directive on file.   -With possible hepatic mets, complex renal cysts, pulmonary nodule consider goals of care      Total time spent with patient visit at the skilled nursing facility was 45 min including patient visit and review of past records. Greater than 50% of total time spent with counseling and coordinating care due to complexity     Electronically signed by:  KAJAL Schneider CNP

## 2017-12-28 NOTE — LETTER
12/28/2017        RE: Mary Grace Rivers  [address]  [facility] [address]        [place] [facility]  PRIMARY CARE PROVIDER AND CLINIC:  System, Provider Not In    Chief Complaint   Patient presents with     Hospital F/U       HPI:    Mary Grace Rivers is a 89 year old  (6/14/1925),admitted to the [facility] TCU from [facility].  Hospital stay 12/20/2017 through 12/27/2017.  Admitted to this facility for  rehab, medical management and nursing care.  HPI information obtained from: patient report and [facility] chart review.        Hospital Assessment & Plan     89 year old female, with PmHx of depression, hypertension, UTI, colon polyp and history of breast cancer, who presented on 12/20/2017 with abdominal distension, nausea and vomiting post meals and constipation for 4 days. Workup done on 12/20/17 revealed, CMP significant for Alb 2.8. CBC revealed, Hb 11.5, WBC 14.8 and Plts 335. INR was 1.02. Lipase was normal. EKG on 12/20/17 revealed NSR rate 81/min. CT abd/pelvis on 12/20/17 revealed, colonic obstruction at the splenic flexure. An underlying mass lesion cannot be excluded. Sigmoid/descending colonic diverticulosis, without CT evidence of diverticulitis. Hepatic several small hypodensities, which due to their ill-defined nature are indeterminate and could represent small hepatic metastasis. Left lower lobe pulmonary nonspecific 0.4 cm nodule on the uppermost image. Bilateral renal cysts, two of which appear complex. Chest x-rays on 12/20/17 was normal. EKG done on 12/21/17 revealed, NSR rate 93/min, with occasional PVCs. ECHO done on 12/21/17 revealed, moderate to severe concentric LV hypertrophy, with hyperdynamic systolic function. Visually, LVEF is estimated at 70-75%. No evidence of dynamic mitral regurgitation. Normal RV size with hyperdynamic systolic function. Trileaflet aortic valve sclerosis, without hemodynamically significant stenosis. Mild  tricuspid regurgitation.      Hospital Course    1. S/p Exploratory laparoscopy, with loop transverse colostomy with biopsy on 12/21/2017  - based on CT abdomen findings- she was taken to OR and underwent the above procedure; post op she had been followed by CR service; initially, she had a NGT in place which eventually was removed and she was started on clear liquids, then advanced to full liquids diet-->low fiber diet now;   - was having intermittent abdominal pain related to passing gas --> started on Simethicone prn on 12/25 and today seems better; tried to avoid narcotics sinec it seems that cause her significant hypoxia  - she was initially on Cipro/Flagyl- now off ABX  - she was seen by [facility] nurse who provided helpful informations regarding ostomy care  - pathology report still pending     2. Acute hypoxic respiratory failure- resolved  - Echo with normal EF as above  - was on HFNC- now improving- down to 2L yesterday and now sat well 91% on RA  - Chest x-rays on 12/22/17 did not show any airspace consolidation, pneumothorax or pleural effusion. Heart size is normal.  - likely related to Dilaudid use- so will try to minimize  - off iv fluids  - IS prn.       3. Hypertension: controlled.   - PTA Amlodipine-Olmesartan are on hold.  - IV hydralazine prn.      4. GERD prophylaxis: on protonix 40mg qd.       5. Acute blood loss anemia: due to recent abdominal surgery and hemodilution.   Hb 11.5-->9.3-->8.8g/dl on 12/22/17      6. Severe malnutrition: this is due to underlying illness. Appreciate Nutritionist input.  _________________________________    Current issues are:        Malignant neoplasm of splenic flexure (H)  Colonic mass  Colostomy in place  Diverticulosis of large intestine without hemorrhage  Pathology still pending. Tolerating an advanced diet. Denies abdominal pain, nausea, vomiting. Burping at times during  today's visit. Ostomy bag with liquid stool.     Severe protein-calorie malnutrition  "(H)  Thought related to condition. She reports to eating both breakfast and lunch but nursing cannot confirm. Albumin level on 12/20 was 2.8. GERD prophylaxis was d/c'd at discharge from hospital. Simethicone prn has not been used today.    Essential hypertension  Denies chest pain or shortness of breath. Moses daily. Weight stable.    Physical deconditioning  Pain of left lower leg  Unable to ambulate related to left knee pain and reports this started when \"she was recovering from surgery\". When attempted to stand today she could not tolerate. Left knee is swollen, no increased redness or warmth but when pressed for pain description she is showing that it radiates from groin down her leg. Had US in past 6/2015 for similar problem which was unremarkable.    Advance Care Planning  Evaluate for change to code status    CODE STATUS/ADVANCE DIRECTIVES DISCUSSION:   CPR/Full code   Patient's living condition: lives alone    ALLERGIES:Penicillins  PAST MEDICAL HISTORY:  has a past medical history of Cancer (H); Depressive disorder; and Hypertension.  PAST SURGICAL HISTORY:  has a past surgical history that includes Breast surgery; GYN surgery; Cholecystectomy; orthopedic surgery; and Laparoscopic assisted colostomy (N/A, 12/21/2017).  FAMILY HISTORY: family history is not on file.  SOCIAL HISTORY:  reports that she has quit smoking. She has never used smokeless tobacco. She reports that she does not drink alcohol or use illicit drugs.    Post Discharge Medication Reconciliation Status: discharge medications reconciled and changed, per note/orders (see AVS).  Current Outpatient Prescriptions   Medication Sig Dispense Refill     enoxaparin (LOVENOX) 40 MG/0.4ML injection Inject 0.4 mLs (40 mg) Subcutaneous every 24 hours 25 Syringe 0     simethicone (MYLICON) 80 MG chewable tablet Take 1 tablet (80 mg) by mouth every 6 hours as needed for cramping 180 tablet      Amlodipine-Olmesartan (MOSES) 10-40 MG TABS Take 1 tablet by " "mouth daily       Acetaminophen (TYLENOL PO) Take 650 mg by mouth 4 times daily AND may have 650 mg po once daily prn         ROS:  4 point ROS including Respiratory, CV, GI and , other than that noted in the HPI,  is negative    Exam:  /68  Pulse 94  Temp 98.5  F (36.9  C)  Resp 18  Ht 4' 10\" (1.473 m)  Wt 141 lb 9.6 oz (64.2 kg)  SpO2 96%  BMI 29.59 kg/m2  GENERAL APPEARANCE:  Alert, in no distress  ENT:  Mouth and posterior oropharynx normal, moist mucous membranes, [place]  EYES:  EOM, conjunctivae, lids, pupils and irises normal  RESP:  respiratory effort and palpation of chest normal, lungs clear to auscultation   CV:  Palpation and auscultation of heart done , regular rate and rhythm, no murmur, rub, or gallop, trace edema right leg and foot, +2 left knee, leg and foot  ABDOMEN:  normal bowel sounds, soft, nontender, ostomy bag in place, area is c/d/i  M/S:   Gait and station abnormal pain with weight bearing to LLE, dorsi/flex intact, +2 pedal pulses  SKIN:  Inspection of skin and subcutaneous tissue baseline  NEURO:   Examination of sensation by touch normal  PSYCH:  oriented x 2 but forgetful    Lab/Diagnostic data:    CT Abdomen Pelvis 12/20/17    FINDINGS: Calcified granulomas are noted at the lung bases. However,  the upper most image, there is a nonspecific 0.4 cm left lower lobe  pulmonary nodule. Multiple hepatic hypodensities are noted. Several  these are ill-defined and therefore indeterminate. Intrahepatic  biliary prominence is noted which may be related to  postcholecystectomy state. Multiple bilateral renal cysts are noted.  Thin rim of calcification is noted within a right superior pole cyst.  Hyperdense cyst is noted in the left renal lower pole. A  nonobstructing stone is also noted within the left mid pole. There is  marked fluid dilatation of the colon extending from the cecum to the  splenic flexure suggesting colonic obstruction. Descending and sigmoid  colon are decompressed. " No free peritoneal fluid or air is  demonstrated. Sigmoid diverticulosis is noted without adjacent  inflammatory stranding to clearly indicate diverticulitis.       IMPRESSION:  1. Colonic obstruction at the splenic flexure. An underlying mass  lesion cannot be excluded.  2. Sigmoid/descending colonic diverticulosis without CT evidence of  diverticulitis.  3. Hepatic several small hypodensities which due to their ill-defined  nature are indeterminate and could represent small hepatic metastasis.  4. Left lower lobe pulmonary nonspecific 0.4 cm nodule on the  uppermost image.  5. Bilateral renal cysts, two of which appear complex.    ADDENDUM: In addition to the findings below, there is a 4.0 cm mass in  the ascending colon on the right best seen on coronal series 4 image  20. I spoke with the PA taking care of this patient at 1720 regarding  these findings.    CBC RESULTS:   Recent Labs   Lab Test  12/27/17   0729 12/24/17   0655  12/22/17   0729  12/21/17   0611  12/20/17   0940   WBC   --    --    --   9.3  14.8*   RBC   --    --    --   4.07  5.00   HGB   --    --   8.8*  9.3*  11.5*   HCT   --    --    --   31.0*  36.8   MCV   --    --    --   76*  74*   MCH   --    --    --   22.9*  23.0*   MCHC   --    --    --   30.0*  31.3*   RDW   --    --    --   18.1*  17.9*   PLT  280  313  239  257  335       Last Basic Metabolic Panel:  Recent Labs   Lab Test  12/27/17 0729 12/22/17   0729  12/21/17   0611  12/20/17   0940   NA   --    --   143  136   POTASSIUM   --    --   3.7  4.2   CHLORIDE   --    --   114*  106   LUPE   --    --   8.0*  9.4   CO2   --    --   21 22   BUN   --    --   15  18   CR  0.51*  0.68  0.68  0.89   GLC   --   140*  75  120*       Liver Function Studies -   Recent Labs   Lab Test  12/20/17   0940  12/06/17   0350   PROTTOTAL  7.0  6.6*   ALBUMIN  2.8*  3.0*   BILITOTAL  0.8  0.2   ALKPHOS  81  90   AST  16  10   ALT  12  15       ASSESSMENT/PLAN:  (C18.5) Malignant neoplasm of splenic  flexure (H)  (primary encounter diagnosis)  (K63.9) Colonic mass  (Z93.3) Colostomy in place (H)  (K57.30) Diverticulosis of large intestine without hemorrhage  Comment: Acute and healing  Plan:   -Colostomy orders in place  -Low Residue diet as tolerated  -Simethicone 80 mg po q6H prn  -Enoxaparin 40 mg subcutaneous q24H  -Colorectal surgery f/u 1/18/18    (E43) Severe protein-calorie malnutrition (H)  Comment: Ongoing  Plan:   -Probable 2/2 bowel obstruction  -Ensure BID between meals  -Daily weights    (I10) Essential hypertension  Comment: Chronic  Plan:  -Amlodipine-Olmesartan (Ian 10-40 mg) po daily  -VS daily  -BMP    (R53.81) Physical deconditioning  (M79.662) Pain of left lower leg  Comment: Acute on Chronic  Plan:   -US to rule out DVT  -PT/OT   -Francis Hose on am and off HS  -CBC w/diff  -Consider ortho PA to evaluate    (Z71.89) Advanced directives, counseling/discussion  Comment: Ongoing  Plan:   -Call out to daughter. No advance directive on file.   -With possible hepatic mets, complex renal cysts, pulmonary nodule consider goals of care      Total time spent with patient visit at the skilled nursing facility was 45 min including patient visit and review of past records. Greater than 50% of total time spent with counseling and coordinating care due to complexity     Electronically signed by:  KAJAL Schneider CNP                    Sincerely,        KAJAL Schneider CNP

## 2017-12-29 NOTE — PLAN OF CARE
Problem: Patient Care Overview  Goal: Plan of Care/Patient Progress Review  Physical Therapy Discharge Summary    Reason for therapy discharge:    Discharged to transitional care facility.    Progress towards therapy goal(s). See goals on Care Plan in [facility] electronic health record for goal details.  Goals not met.  Barriers to achieving goals:   discharge from facility.    Therapy recommendation(s):    Continued therapy is recommended.  Rationale/Recommendations:  Recommend continued PT at TCU to progress mobility.

## 2017-12-29 NOTE — PROGRESS NOTES
PRIMARY CARE PROVIDER AND CLINIC RESPONSIBLE:  System, Provider Not In,          ADMISSION HISTORY AND PHYSICAL EXAMINATION     Chief Complaint   Patient presents with     Fatigue         HISTORY OF PRESENT ILLNESS:  89 year old female, (6/14/1925), [facility] for continuation of medical care and rehab.  HPI information obtained from: facility chart records, facility staff, patient report and [facility] chart review.    Mary Grace Rivers is a 89 year old female with history of hypertension,depression, colonic polyps, breast cancer who was admitted at [facility] from 12/20/17 to 12/27/17 due to abdominal pain, abdominal distension, n&v post meals and constipation started 4 days prior admission. she was found to have a colonic obstruction at the splenic flexure, as per CT abdomen/pelvis. CRS Surgery team was consulted and she underwent lap assisted colostomy on 12/21/17. Post-op course was significant anemia. She has hard hearing. She complains left knee and leg pain and swelling at left knee and painful with touch and movement, more a the knee, in which she has OA of knee. She had prior right knee replacement in the past. US of left leg showed negative for DVT 12/28/17. He colostomy is working. She feels weak and tired. Slept well, appetite improving. Patient denies chest pain, dyspnea, abdominal pain, n&v, fever, chills, dysuria. The rest of ROS is unremarkable. Patient is seen and examined by me with DHAVAL Goyal NP. Please see DHAVAL Goyal's admit noted dated 12/28/17 for details of admission, past medical history, family history, allergies, medication list, social history and other details pertinent with this admission. Hospital admission and dc summary reviewed.    Past Medical History:   Diagnosis Date     Cancer (H)     breast     Depressive disorder      Hypertension        Past Surgical History:   Procedure Laterality Date     BREAST SURGERY       CHOLECYSTECTOMY       GYN  "SURGERY       LAPAROSCOPIC ASSISTED COLOSTOMY N/A 12/21/2017    Procedure: LAPAROSCOPIC ASSISTED COLOSTOMY;  EXPLORATORY LAPAROSCOPY WITH LOOP TRANSVERSE COLOSTOMY WITH BIOPSY;  Surgeon: Olivia Alarcon MD;  Location:  OR     ORTHOPEDIC SURGERY         Current Outpatient Prescriptions   Medication Sig     enoxaparin (LOVENOX) 40 MG/0.4ML injection Inject 0.4 mLs (40 mg) Subcutaneous every 24 hours     simethicone (MYLICON) 80 MG chewable tablet Take 1 tablet (80 mg) by mouth every 6 hours as needed for cramping     Amlodipine-Olmesartan (MOSES) 10-40 MG TABS Take 1 tablet by mouth daily     Acetaminophen (TYLENOL PO) Take 650 mg by mouth 4 times daily AND may have 650 mg po once daily prn     No current facility-administered medications for this visit.        Allergies   Allergen Reactions     Penicillins        Social History     Social History     Marital status: Single     Spouse name: N/A     Number of children: N/A     Years of education: N/A     Occupational History     Not on file.     Social History Main Topics     Smoking status: Former Smoker     Smokeless tobacco: Never Used     Alcohol use No     Drug use: No     Sexual activity: Not Currently     Other Topics Concern     Not on file     Social History Narrative        Information reviewed:  Medications, vital signs, orders, nursing notes, problem list, hospital information.     ROS: All 10 point review of system completed, those pertinent positive, please see H&P, the remaining ROS is negative.    /60  Pulse 97  Temp 99.6  F (37.6  C)  Resp 18  Ht 4' 10\" (1.473 m)  Wt 130 lb 3.2 oz (59.1 kg)  SpO2 92%  BMI 27.21 kg/m2    PHYSICAL EXAMINATION:   GENERAL:  No acute distress.  SKIN:  Dry and warm.  There is no rash at area of skin examined.  HEENT:  Head without trauma.  Pupils round, reactive. Exam of conjunctiva and lids are normal. Sclera without icterus. There is no oral thrush. Hard hearing.  NECK:  Supple. No jugular venous " distension.  CHEST: No reproducible chest tenderness.   LUNGS:  Normal respiratory effort. Lungs reveal decreased breath sounds at bases. No rales or wheezes.  HEART:  Regular rate and rhythm.  2/3 murmur, but no gallops or rubs auscultated.  ABDOMEN:  Soft, bowel sounds positive.  There is no tenderness or guarding. Colostomy in place.  EXTREMITIES: No edema. Left knee tenderness and swelling. Old right knee old surgical scar.  NEUROLOGIC:  Alert and oriented x3.  Moving all ext. Gait not tested.  PSYCH: Recent and remote memory is mildly impaired. Mood and affect are normal.    Lab/Diagnostic data:  Reviewed    Uric acid pending.    CBC Lab Results (Last 5 results in 365 days)       WBC RBC Hgb Hct MCV MCH MCHC RDW Plt Neut # Lymph # Eos # Baso # Immat. Gran #   12/27/17 0729 -- -- -- -- -- -- -- -- 280 -- -- -- -- --   12/24/17 0655 -- -- -- -- -- -- -- -- 313 -- -- -- -- --   12/22/17 0729 -- -- -- -- -- -- -- -- 239 -- -- -- -- --   12/22/17 0729 -- -- 8.8 -- -- -- -- -- -- -- -- -- -- --   12/21/17 0611 9.3 4.07 9.3 31.0 76 22.9 30.0 18.1 257 -- -- -- -- --   CMP Labs (Last 5 results in 365 days)       Na+ K+ Cl CO2 ANION GAP Glc BUN Creat GFR GFR-Black Calcium Mg ALT AST A1C TSH LDL HIV   12/27/17 0729 -- -- -- -- -- -- -- 0.51 >90 >90 -- -- -- -- -- -- -- --   12/22/17 0729 -- -- -- -- -- -- -- 0.68 80 >90 -- -- -- -- -- -- -- --   12/22/17 0729 -- -- -- -- -- 140 -- -- -- -- -- -- -- -- -- -- -- --   12/21/17 0611 143 3.7 114 21 8 75 15 0.68 81 >90 8.0 -- -- -- -- -- -- --   12/20/17 0940 136 4.2 106 22 8 120 18 0.89 59 72 9.4 -- 12 16 -- -- --      Results for orders placed or performed during the hospital encounter of 12/20/17   XR Chest 2 Views    Narrative    CHEST TWO VIEWS  12/20/2017 10:52 AM    HISTORY:  Chest and abdominal pain.    COMPARISON:  12/6/2017      Impression    IMPRESSION:  No infiltrates or acute process. Heart and pulmonary  vasculature within normal limits. Aortic calcification.  Surgical clips  project over the lower lateral left hemithorax.     JAZZMINE BELTRAN MD   CT Abdomen Pelvis w Contrast    Addendum: 12/20/2017    KINJAL REYES  ACCESSION #[id number]    ADDENDUM: In addition to the findings below, there is a 4.0 cm mass in  the ascending colon on the right best seen on coronal series 4 image  20. I spoke with the PA taking care of this patient at 1720 regarding  these findings.    ROSA COLLINS M.D.  Date of Addendum: 12/20/2017    DASHA ARCE MD      Narrative    CT ABDOMEN/PELVIS WITH CONTRAST December 20, 2017 10:42 AM     HISTORY: Distended tender abdomen with vomiting.     CONTRAST DOSE: 78 mL Isovue-370    TECHNIQUE: Radiation dose for this scan was reduced using automated  exposure control, adjustment of the mA and/or kV according to patient  size, or iterative reconstruction technique.    FINDINGS: Calcified granulomas are noted at the lung bases. However,  the upper most image, there is a nonspecific 0.4 cm left lower lobe  pulmonary nodule. Multiple hepatic hypodensities are noted. Several  these are ill-defined and therefore indeterminate. Intrahepatic  biliary prominence is noted which may be related to  postcholecystectomy state. Multiple bilateral renal cysts are noted.  Thin rim of calcification is noted within a right superior pole cyst.  Hyperdense cyst is noted in the left renal lower pole. A  nonobstructing stone is also noted within the left mid pole. There is  marked fluid dilatation of the colon extending from the cecum to the  splenic flexure suggesting colonic obstruction. Descending and sigmoid  colon are decompressed. No free peritoneal fluid or air is  demonstrated. Sigmoid diverticulosis is noted without adjacent  inflammatory stranding to clearly indicate diverticulitis.      Impression    IMPRESSION:  1. Colonic obstruction at the splenic flexure. An underlying mass  lesion cannot be excluded.  2. Sigmoid/descending colonic diverticulosis without CT  evidence of  diverticulitis.  3. Hepatic several small hypodensities which due to their ill-defined  nature are indeterminate and could represent small hepatic metastasis.  4. Left lower lobe pulmonary nonspecific 0.4 cm nodule on the  uppermost image.  5. Bilateral renal cysts, two of which appear complex.    ROSA COLLINS MD   XR Chest Port 1 View    Narrative    CHEST ONE VIEW PORTABLE   12/22/2017 1:08 PM     HISTORY:  Hypoxic respiratory failure, post transverse loop colostomy.      COMPARISON: 12/20/2017.      Impression    IMPRESSION: Enteric catheter extends to the distal esophagus. The  distal tip is not clearly seen. No airspace consolidation,  pneumothorax, or pleural effusion. Heart size similar to prior.    WAQAS SALGADO MD     US of left leg showed negative for DVT 12/28/17      ASSESSMENT / PLAN:     Physical deconditioning  Plan: PT/OT with increase independence, self-care, strength and endurance and other cares that help return to home/facility of origin, fall precautions. Care conference with patient and family for the progress of rehab and disposition issues will be discussed as planned. Rehab evaluation and other evaluations including CPT are at rehab logs, to be reviewed separately.  Fall risk assessment as well as cognitive evaluation will be formed during rehab stay if indicated.    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 Suspected mets to liver and peritoneum, follow up CRS surgery and oncology as outpatient. DVT prophylaxis with Lovenox.    Arthritis of left knee  Plan: Known history of osteoarthritis, other differential diagnosis include pseudogout and gout, start Colchicine 0.6 mg bid pending uric acid, PT/OT. Ibuprofen 400 q8 hrs prn too in addition to Colchicine. She had fall on 12/6/17 and was seen at [facility] ED, no injury at that time. If pain persists, consider x-ray of left knee.    Essential hypertension  Plan: Continue current BP  medication, monitor BP.    Other problems with same care. Primary care doctor and other specialists to address those chronic problems in next clinic appointment to be scheduled upon discharge from the TCU.      Total time spent with patient visit was 36 min including patient visit, review of past records, patients counseling and coordinating care.        Venancio Greenfield MD

## 2017-12-29 NOTE — MR AVS SNAPSHOT
"              After Visit Summary   2017    Mary Grace Rivers    MRN: [id number]           Patient Information     Date Of Birth          1925        Visit Information        Provider Department      2017 5:30 AM Venancio Greenfield MD [facility]        Today's Diagnoses     Physical deconditioning    -  1    Malignant neoplasm of splenic flexure (H)        S/P colostomy (H)        Arthritis of left knee        Essential hypertension           Follow-ups after your visit        Who to contact     If you have questions or need follow up information about today's clinic visit or your schedule please contact [facility] directly at [phone].  Normal or non-critical lab and imaging results will be communicated to you by [employer], letter or phone within 4 business days after the clinic has received the results. If you do not hear from us within 7 days, please contact the clinic through [employer] or phone. If you have a critical or abnormal lab result, we will notify you by phone as soon as possible.  Submit refill requests through [employer] or call your pharmacy and they will forward the refill request to us. Please allow 3 business days for your refill to be completed.          Additional Information About Your Visit        MyChart Information     [employer] lets you send messages to your doctor, view your test results, renew your prescriptions, schedule appointments and more. To sign up, go to [email or web address] . Click on \"Log in\" on the left side of the screen, which will take you to the Welcome page. Then click on \"Sign up Now\" on the right side of the page.     You will be asked to enter the access code listed below, as well as some personal information. Please follow the directions to create your username and password.     Your access code is: [email or web address]  Expires: 2018  5:50 PM     Your access code will  in 90 days. If you need help or a new code, " "please call your [place] clinic or [phone].        Care EveryWhere ID     This is your Care EveryWhere ID. This could be used by other organizations to access your [place] medical records  [id number]        Your Vitals Were     Pulse Temperature Respirations Height Pulse Oximetry BMI (Body Mass Index)    97 99.6  F (37.6  C) 18 4' 10\" (1.473 m) 92% 27.21 kg/m2       Blood Pressure from Last 3 Encounters:   12/29/17 155/60   12/28/17 149/68   12/27/17 150/61    Weight from Last 3 Encounters:   12/29/17 130 lb 3.2 oz (59.1 kg)   12/28/17 141 lb 9.6 oz (64.2 kg)   12/27/17 133 lb 6.1 oz (60.5 kg)              Today, you had the following     No orders found for display       Primary Care Provider Fax #    Provider Not In System [phone]                Equal Access to Services     ISAAC RODRIGUEZ : Hadii wade colungao Soanalia, waaxda luqadaha, qaybta kaalmada adeimtiazyada, hai mendez . So [facility] [phone].    ATENCIÓN: Si habla español, tiene a ovalle disposición servicios gratuitos de asistencia lingüística. Llame al [phone].    We comply with applicable federal civil rights laws and [place] laws. We do not discriminate on the basis of race, color, national origin, age, disability, sex, sexual orientation, or gender identity.            Thank you!     Thank you for choosing [facility]  for your care. Our goal is always to provide you with excellent care. Hearing back from our patients is one way we can continue to improve our services. Please take a few minutes to complete the written survey that you may receive in the mail after your visit with us. Thank you!             Your Updated Medication List - Protect others around you: Learn how to safely use, store and throw away your medicines at www.disposemymeds.org.          This list is accurate as of: 12/29/17  9:01 AM.  Always use your most recent med list.                   Brand Name Dispense Instructions " for use Diagnosis    MOSES 10-40 MG Tabs   Generic drug:  Amlodipine-Olmesartan      Take 1 tablet by mouth daily        COLCHICINE PO      Take 0.6 mg by mouth 2 times daily        enoxaparin 40 MG/0.4ML injection    LOVENOX    25 Syringe    Inject 0.4 mLs (40 mg) Subcutaneous every 24 hours    Malignant neoplasm of splenic flexure (H)       IBUPROFEN PO      Take 400 mg by mouth every 8 hours as needed for moderate pain        simethicone 80 MG chewable tablet    MYLICON    180 tablet    Take 1 tablet (80 mg) by mouth every 6 hours as needed for cramping    Flatulence, eructation and gas pain       TYLENOL PO      Take 650 mg by mouth 4 times daily AND may have 650 mg po once daily prn

## 2018-01-01 ENCOUNTER — DISCHARGE SUMMARY NURSING HOME (OUTPATIENT)
Dept: GERIATRICS | Facility: CLINIC | Age: 83
End: 2018-01-01
Payer: COMMERCIAL

## 2018-01-01 ENCOUNTER — NURSING HOME VISIT (OUTPATIENT)
Dept: GERIATRICS | Facility: CLINIC | Age: 83
End: 2018-01-01
Payer: COMMERCIAL

## 2018-01-01 ENCOUNTER — TRANSFERRED RECORDS (OUTPATIENT)
Dept: HEALTH INFORMATION MANAGEMENT | Facility: CLINIC | Age: 83
End: 2018-01-01

## 2018-01-01 VITALS
WEIGHT: 121.8 LBS | SYSTOLIC BLOOD PRESSURE: 126 MMHG | BODY MASS INDEX: 25.46 KG/M2 | RESPIRATION RATE: 18 BRPM | HEART RATE: 91 BPM | OXYGEN SATURATION: 96 % | DIASTOLIC BLOOD PRESSURE: 64 MMHG | TEMPERATURE: 99.3 F

## 2018-01-01 VITALS
SYSTOLIC BLOOD PRESSURE: 143 MMHG | HEART RATE: 89 BPM | BODY MASS INDEX: 26.79 KG/M2 | TEMPERATURE: 100.1 F | DIASTOLIC BLOOD PRESSURE: 77 MMHG | RESPIRATION RATE: 17 BRPM | OXYGEN SATURATION: 96 % | WEIGHT: 128.2 LBS

## 2018-01-01 VITALS
TEMPERATURE: 98.5 F | DIASTOLIC BLOOD PRESSURE: 70 MMHG | OXYGEN SATURATION: 96 % | RESPIRATION RATE: 16 BRPM | HEART RATE: 94 BPM | WEIGHT: 121.4 LBS | SYSTOLIC BLOOD PRESSURE: 131 MMHG | BODY MASS INDEX: 25.37 KG/M2

## 2018-01-01 DIAGNOSIS — R53.81 PHYSICAL DECONDITIONING: ICD-10-CM

## 2018-01-01 DIAGNOSIS — D62 ANEMIA DUE TO BLOOD LOSS, ACUTE: ICD-10-CM

## 2018-01-01 DIAGNOSIS — Z93.3 S/P COLOSTOMY (H): ICD-10-CM

## 2018-01-01 DIAGNOSIS — C18.5 MALIGNANT NEOPLASM OF SPLENIC FLEXURE (H): Primary | ICD-10-CM

## 2018-01-01 DIAGNOSIS — I10 ESSENTIAL HYPERTENSION: ICD-10-CM

## 2018-01-01 DIAGNOSIS — E87.6 HYPOKALEMIA: Primary | ICD-10-CM

## 2018-01-01 DIAGNOSIS — C18.5 MALIGNANT NEOPLASM OF SPLENIC FLEXURE (H): ICD-10-CM

## 2018-01-01 DIAGNOSIS — M17.12 ARTHRITIS OF LEFT KNEE: ICD-10-CM

## 2018-01-01 DIAGNOSIS — E87.6 HYPOKALEMIA: ICD-10-CM

## 2018-01-01 DIAGNOSIS — M25.569 KNEE PAIN, UNSPECIFIED CHRONICITY, UNSPECIFIED LATERALITY: ICD-10-CM

## 2018-01-01 LAB
ANION GAP SERPL CALCULATED.3IONS-SCNC: 8 MMOL/L (ref 3–14)
BUN SERPL-MCNC: 15 MG/DL (ref 7–30)
CALCIUM SERPL-MCNC: 8.7 MG/DL (ref 8.5–10.1)
CHLORIDE SERPLBLD-SCNC: 108 MMOL/L (ref 94–109)
CO2 SERPL-SCNC: 24 MMOL/L (ref 20–32)
COPATH REPORT: NORMAL
CREAT SERPL-MCNC: 0.72 MG/DL (ref 0.52–1.04)
GFR SERPL CREATININE-BSD FRML MDRD: 76 ML/MIN/1.73M2
GLUCOSE SERPL-MCNC: 102 MG/DL (ref 70–99)
POTASSIUM SERPL-SCNC: 4.2 MMOL/L (ref 3.4–5.3)
SODIUM SERPL-SCNC: 140 MMOL/L (ref 133–144)

## 2018-01-01 PROCEDURE — 99316 NF DSCHRG MGMT 30 MIN+: CPT | Performed by: NURSE PRACTITIONER

## 2018-01-01 PROCEDURE — 99310 SBSQ NF CARE HIGH MDM 45: CPT | Performed by: NURSE PRACTITIONER

## 2018-01-01 PROCEDURE — 99309 SBSQ NF CARE MODERATE MDM 30: CPT | Performed by: NURSE PRACTITIONER

## 2018-01-03 PROBLEM — D62 ANEMIA DUE TO BLOOD LOSS, ACUTE: Status: ACTIVE | Noted: 2018-01-01

## 2018-01-03 PROBLEM — R53.81 PHYSICAL DECONDITIONING: Status: ACTIVE | Noted: 2018-01-01

## 2018-01-03 PROBLEM — Z93.3 S/P COLOSTOMY (H): Status: ACTIVE | Noted: 2018-01-01

## 2018-01-03 PROBLEM — E87.6 HYPOKALEMIA: Status: ACTIVE | Noted: 2018-01-01

## 2018-01-03 NOTE — PROGRESS NOTES
[place] [facility]    Chief Complaint   Patient presents with     RECHECK       HPI:    Mary Grace Rivers is a 89 year old  (6/14/1925), who is being seen today for an episodic care visit at [facility].  HPI information obtained from: facility chart records, facility staff, patient report and [facility] chart review.Today's concern is:  Hypokalemia- K on 12/29 was 2.7. No documentation that any one was notified. No supplement given. Follow up K 4.2  Malignant neoplasm of splenic flexure (H)  S/P colostomy (H)  Patient transferred to TCU from hospital after exploratory lap done due to colonic obstruction. She was found to have an underlying mass. A transverse loop colostomy was placed. Post surgery complications include acute hypoxic respiratory failure requiring high flow oxygen. Imaging negative for ptx, consolidation, pleural effusion. ? Due to dilaudid.   Diet was advanced. Ostomy is functioning. She was weaned off of oxygen. She was transferred to TCU for ongoing recovery. Pathology report showed moderately differentiated adenocarcinoma.   [facility] nurse did consult in hospital.   DVT prophylaxis is lovenox.   Anemia due to blood loss, acute  hgb went from 11.5 to 8.8 on 12/22    Essential hypertension  BP's: 143/77, 115/61, 169/70    Physical deconditioning  Lives alone in apartment, senior apartment. Uses a walker.    she recently moved to [place], I think from [place]   Knee pain  Patient was started on colchicine and ibuprofen last week due to knee pain. Today she reports no knee pain.     ALLERGIES: Penicillins  Past Medical, Surgical, Family and Social History reviewed and updated in [facility].    Current Outpatient Prescriptions   Medication Sig Dispense Refill     IBUPROFEN PO Take 400 mg by mouth every 8 hours as needed for moderate pain       enoxaparin (LOVENOX) 40 MG/0.4ML injection Inject 0.4 mLs (40 mg) Subcutaneous every 24 hours 25 Syringe 0     simethicone (MYLICON) 80 MG chewable  tablet Take 1 tablet (80 mg) by mouth every 6 hours as needed for cramping 180 tablet      Amlodipine-Olmesartan (MOSES) 10-40 MG TABS Take 1 tablet by mouth daily       Acetaminophen (TYLENOL PO) Take 650 mg by mouth 4 times daily AND may have 650 mg po once daily prn       Medications reviewed:  Medications reconciled to facility chart and changes were made to reflect current medications as identified as above med list. Below are the changes that were made:   Medications stopped since last EPIC medication reconciliation:   There are no discontinued medications.    Medications started since last [facility] medication reconciliation:  No orders of the defined types were placed in this encounter.      REVIEW OF SYSTEMS:  4 point ROS including Respiratory, CV, GI and , other than that noted in the HPI,  is negative    Physical Exam:  /77  Pulse 89  Temp 100.1  F (37.8  C)  Resp 17  Wt 128 lb 3.2 oz (58.2 kg)  SpO2 96%  BMI 26.79 kg/m2  GENERAL APPEARANCE:  Alert, in no distress  ENT:  Mouth and posterior oropharynx normal, moist mucous membranes, hearing acuity adequate   EYES:  EOM, conjunctivae, lids, pupils and irises normal  NECK:  No adenopathy,masses or thyromegaly  RESP:  respiratory effort and palpation of chest normal, no respiratory distress, Lung sounds clear  CV:  Palpation and auscultation of heart done , rate and rhythm reg, no murmur, no rub or gallop, Edema none  ABDOMEN:  normal bowel sounds, soft, nontender, colostomy with watery stool  M/S:   Gait and station steady with walker, Digits and nails normal   SKIN:  Inspection/Palpation of skin and subcutaneous tissue ostomy is beefy red  NEURO: 2-12 in normal limits and at patient's baseline  PSYCH:  insight and judgement, memory some impairment , affect and mood normal      Recent Labs:  12/29 FINAL DIAGNOSIS:   Soft tissue, left upper quadrant, biopsy   - Moderately differentiated adenocarcinoma, consistent with metastatic   colorectal  carcinoma   CBC RESULTS:   Recent Labs   Lab Test 12/29/17 12/27/17   0729   12/22/17   0729  12/21/17   0611   WBC  10.5   --    --    --   9.3   RBC  3.73*   --    --    --   4.07   HGB  8.4*   --    --   8.8*  9.3*   HCT  27.6*   --    --    --   31.0*   MCV  74*   --    --    --   76*   MCH  22.5*   --    --    --   22.9*   MCHC  30.4*   --    --    --   30.0*   RDW  19.2*   --    --    --   18.1*   PLT  252  280   < >  239  257    < > = values in this interval not displayed.       Last Basic Metabolic Panel:  Recent Labs   Lab Test 12/29/17 12/27/17 0729 12/22/17 0729 12/21/17   0611   NA  140   --    --   143   POTASSIUM  2.7*   --    --   3.7   CHLORIDE  104   --    --   114*   LUPE  7.5*   --    --   8.0*   CO2  28   --    --   21   BUN  5*   --    --   15   CR  0.58  0.51*  0.68  0.68   GLC  102*   --   140*  75       Liver Function Studies -   Recent Labs   Lab Test  12/20/17   0940  12/06/17   0350   PROTTOTAL  7.0  6.6*   ALBUMIN  2.8*  3.0*   BILITOTAL  0.8  0.2   ALKPHOS  81  90   AST  16  10   ALT  12  15       Assessment/Plan:  (E87.6) Hypokalemia  (primary encounter diagnosis)  Comment: low K last week. No supplement given. Likely related to loose stool  Plan: kcl 20 meq BID tonight, STAT BMP    (C18.5) Malignant neoplasm of splenic flexure (H)  (Z93.3) S/P colostomy (H)  Comment: found to have adenocarcinoma at splenic flexure. Ostomy seems to be working   Plan: routine ostomy cares. Follow up with colorectal surgery    (D62) Anemia due to blood loss, acute  Comment: hgb stable at 8  Plan: hgb 1/4    (I10) Essential hypertension  Comment: adequate control  Plan: monitor     (R53.81) Physical deconditioning/ knee pain  Comment: due to colon cancer and colostomy placement  Plan: physical therapy and OCCUPATIONAL THERAPY. DISCONTINUE colchicine.         Electronically signed by  KAJAL Desir CNP

## 2018-01-10 NOTE — PROGRESS NOTES
[place] [facility]    Chief Complaint   Patient presents with     RECHECK       HPI:    Mary Grace Rivers is a 89 year old  (6/14/1925), who is being seen today for an episodic care visit at [place] on [facility] TCU.  HPI information obtained from: facility chart records, facility staff, patient report and [facility] chart review.Today's concern is:  Hypokalemia  K on 12/29 was 2.7. No documentation that any one was notified. No supplement given.   K on 1/3/18 3.5    Malignant neoplasm of splenic flexure (H)  S/P colostomy (H)  Patient transferred to TCU from hospital after exploratory lap done due to colonic obstruction. She was found to have an underlying mass.  A transverse loop colostomy was placed. Post surgery complications include acute hypoxic respiratory failure requiring high flow oxygen. Imaging negative for ptx, consolidation, pleural effusion. ? Due to dilaudid.   Diet was advanced. Ostomy is functioning function. Weaned off of oxygen. She was transferred to TCU for ongoing recovery. Pathology report showed moderately differentiated adenocarcinoma.   [facility] nurse did consult in hospital. She has not learned how to care for ostomy yet.   DVT prophylaxis is lovenox. She is walking length of hallway with walker.     Anemia due to blood loss, acute  hgb went from 11.5 to 8.8 on 12/22    Essential hypertension  BP's: 126/64, 150/70, 127/75    Physical deconditioning  [facility]. Uses a walker.   She has daughter here.  She recently moved to [place], I think from [place]     Knee pain  Patient was started on colchicine and ibuprofen last week due to knee pain. Today she reports some knee pain.     ALLERGIES: Penicillins  Past Medical, Surgical, Family and Social History reviewed and updated in [facility].    Current Outpatient Prescriptions   Medication Sig Dispense Refill     IBUPROFEN PO Take 400 mg by mouth every 8 hours as needed for moderate pain        enoxaparin (LOVENOX) 40 MG/0.4ML injection Inject 0.4 mLs (40 mg) Subcutaneous every 24 hours 25 Syringe 0     simethicone (MYLICON) 80 MG chewable tablet Take 1 tablet (80 mg) by mouth every 6 hours as needed for cramping 180 tablet      Amlodipine-Olmesartan (MOSES) 10-40 MG TABS Take 1 tablet by mouth daily       Acetaminophen (TYLENOL PO) Take 650 mg by mouth 4 times daily AND may have 650 mg po once daily prn       Medications reviewed:  Medications reconciled to facility chart and changes were made to reflect current medications as identified as above med list. Below are the changes that were made:   Medications stopped since last EPIC medication reconciliation:   There are no discontinued medications.    Medications started since last [facility] medication reconciliation:  No orders of the defined types were placed in this encounter.      REVIEW OF SYSTEMS:  4 point ROS including Respiratory, CV, GI and , other than that noted in the HPI,  is negative    Physical Exam:  /64  Pulse 91  Temp 99.3  F (37.4  C)  Resp 18  Wt 121 lb 12.8 oz (55.2 kg)  SpO2 96%  BMI 25.46 kg/m2  GENERAL APPEARANCE:  Alert, in no distress  ENT:  Mouth and posterior oropharynx normal, moist mucous membranes, hearing acuity adequate   EYES:  EOM, conjunctivae, lids, pupils and irises normal  RESP:  respiratory effort and palpation of chest normal, no respiratory distress,  CV: , Edema none  ABDOMEN:  normal bowel sounds, soft, nontender, colostomy with watery stool  M/S:   Gait and station steady with walker, Digits and nails normal   SKIN:  Inspection/Palpation of skin and subcutaneous tissue ostomy is beefy red  NEURO: 2-12 in normal limits and at patient's baseline  PSYCH:  insight and judgement, memory some impairment , affect and mood normal      Recent Labs:  1/3/18 K 3.5      12/29 FINAL DIAGNOSIS:   Soft tissue, left upper quadrant, biopsy   - Moderately differentiated adenocarcinoma, consistent with metastatic    colorectal carcinoma   CBC RESULTS:   Recent Labs   Lab Test 12/29/17 12/27/17   0729   12/22/17   0729  12/21/17   0611   WBC  10.5   --    --    --   9.3   RBC  3.73*   --    --    --   4.07   HGB  8.4*   --    --   8.8*  9.3*   HCT  27.6*   --    --    --   31.0*   MCV  74*   --    --    --   76*   MCH  22.5*   --    --    --   22.9*   MCHC  30.4*   --    --    --   30.0*   RDW  19.2*   --    --    --   18.1*   PLT  252  280   < >  239  257    < > = values in this interval not displayed.       Last Basic Metabolic Panel:  Recent Labs   Lab Test 12/29/17 12/27/17   0729 12/22/17   0729  12/21/17   0611   NA  140   --    --   143   POTASSIUM  2.7*   --    --   3.7   CHLORIDE  104   --    --   114*   LUPE  7.5*   --    --   8.0*   CO2  28   --    --   21   BUN  5*   --    --   15   CR  0.58  0.51*  0.68  0.68   GLC  102*   --   140*  75       Liver Function Studies -   Recent Labs   Lab Test  12/20/17   0940  12/06/17   0350   PROTTOTAL  7.0  6.6*   ALBUMIN  2.8*  3.0*   BILITOTAL  0.8  0.2   ALKPHOS  81  90   AST  16  10   ALT  12  15       Assessment/Plan:  (E87.6) Hypokalemia  (primary encounter diagnosis)  Comment: low K last week. No supplement given. Likely related to loose stool. potassiume recheck WNL  Plan: BMP 1/12/18    (C18.5) Malignant neoplasm of splenic flexure (H)  (Z93.3) S/P colostomy (H)  Comment: found to have adenocarcinoma at splenic flexure. Ostomy seems to be working. She continues on lovenox for DVT prophylaxis. Will continue for total of three weeks. Last day 1/17/18  Plan: routine ostomy cares. Follow up with colorectal surgery. Need to start teaching Mary Grace how to care for ostomy.     (D62) Anemia due to blood loss, acute  Comment: hgb stable at 8. Rechecked on 1/3/18 8.4  Plan: routine monitoring.     (I10) Essential hypertension  Comment: adequate control  Plan: monitor     (R53.81) Physical deconditioning/ knee pain  Comment: due to colon cancer and colostomy placement  Plan:  physical therapy and OCCUPATIONAL THERAPY.      Electronically signed by  KAJAL Desir CNP

## 2018-01-15 NOTE — PROGRESS NOTES
[place] [facility] DISCHARGE SUMMARY    PATIENT'S NAME: Mary Grace Rivers  YOB: 1925  MEDICAL RECORD NUMBER:  [id number]    PRIMARY CARE PROVIDER AND CLINIC RESPONSIBLE AFTER TRANSFER: [facility]    CODE STATUS/ADVANCE DIRECTIVES DISCUSSION:   CPR/Full code        Allergies   Allergen Reactions     Penicillins        TRANSFERRING PROVIDERS: KAJAL Desir CNP, Venancio Greenfield MD  DATE OF SNF ADMISSION:  December / 27 / 2017  DATE OF SNF (anticipated) DISCHARGE: January / 17 / 2018  DISCHARGE DISPOSITION: [facility] Facility: [facility] stay 12/20/2017 to 12/27/2017.     Condition on Discharge:  Improving.  Function:  Needs assist with ADLs  Cognitive Scores: SLUMS 20/30 and CPT 4.4/5.6    Equipment: walker    DISCHARGE DIAGNOSIS:   1. Malignant neoplasm of splenic flexure (H)    2. S/P colostomy (H)    3. Hypokalemia    4. Anemia due to blood loss, acute    5. Essential hypertension    6. Physical deconditioning    7. Knee pain, unspecified chronicity, unspecified laterality        HPI Nursing Facility Course:  HPI information obtained from: facility chart records, facility staff, patient report and [facility] chart review.      Malignant neoplasm of splenic flexure (H)  S/P colostomy (H)  Patient transferred to TCU from hospital after exploratory lap done due to colonic obstruction. She was found to have an underlying mass.. A transverse loop colostomy was placed. Post surgery complications included acute hypoxic respiratory failure requiring high flow oxygen. Imaging negative for ptx, consolidation, pleural effusion. ? Due to dilaudid.   Diet was advanced. Ostomy is functioning function. Weaned off of oxygen. She was transferred to TCU for ongoing recovery. Pathology report showed moderately differentiated adenocarcinoma.   [facility] nurse did consult in hospital. Family and patient have been learning how to  change ostomy bag.   DVT prophylaxis was lovenox. She is walking length of hallway with walker.   Hypokalemia  K on 12/29 was 2.7. No documentation that any one was notified. No supplement given.   K on 1/3/18 3.5  Anemia due to blood loss, acute  hgb went from 11.5 to 8.8 on 12/22  Hemoglobin   Date Value Ref Range Status   12/29/2017 8.4 (L) 11.7 - 15.7 g/dL Final   12/22/2017 8.8 (L) 11.7 - 15.7 g/dL Final   ]  Essential hypertension  BP's: 131/70, 121/61, 109/63    Physical deconditioning  Lives alone in apartment, senior apartment, [facility]. Uses a walker.   She has daughter here.  She recently moved to [place], I think from [place]     Knee pain  Patient was started on colchicine and ibuprofen early on in TCU stay due to knee pain. Today she reports some knee pain. Colchicine has been discontinued.    PAST MEDICAL HISTORY:  has a past medical history of Cancer (H); Depressive disorder; and Hypertension.    DISCHARGE MEDICATIONS:  Current Outpatient Prescriptions   Medication Sig Dispense Refill     simethicone (MYLICON) 80 MG chewable tablet Take 1 tablet (80 mg) by mouth every 6 hours as needed for cramping 180 tablet      Amlodipine-Olmesartan (MOSES) 10-40 MG TABS Take 1 tablet by mouth daily       Acetaminophen (TYLENOL PO) Take 650 mg by mouth 4 times daily AND may have 650 mg po once daily prn         MEDICATION CHANGES/RATIONALE:   12/29 colchicine 0.6mg BID and ibuprofen 400mg q 8 h prn for left knee pain  1/3/18 DISCONTINUE colchicine  1/16/18 DISCONTINUE lovenox and ibuprofen  Controlled medications sent with patient: not applicable/none     ROS:    4 point ROS including Respiratory, CV, GI and , other than that noted in the HPI,  is negative    Physical Exam:   Vitals: /70  Pulse 94  Temp 98.5  F (36.9  C)  Resp 16  Wt 121 lb 6.4 oz (55.1 kg)  SpO2 96%  BMI 25.37 kg/m2  BMI= Body mass index is 25.37 kg/(m^2).    GENERAL APPEARANCE:  Alert, in no distress  ENT:  Mouth and  posterior oropharynx normal, moist mucous membranes, hearing acuity adequate   EYES:  EOM, conjunctivae, lids, pupils and irises normal  RESP:  respiratory effort and palpation of chest normal, no respiratory distress,  CV: , Edema none  ABDOMEN:  normal bowel sounds, soft, nontender, colostomy with watery stool  M/S:   Gait and station steady with walker, Digits and nails normal   SKIN:  Inspection/Palpation of skin and subcutaneous tissue ostomy is beefy red  NEURO: 2-12 in normal limits and at patient's baseline  PSYCH:  insight and judgement, memory some impairment , affect and mood normal    DISCHARGE PLAN:  Occupational Therapy, Physical Therapy, Registered Nurse, Home Health Aide and From:  [facility]  Patient instructed to follow-up with:  PCP in 7 days    Thurs Jan 18th 2018 1030a Colon Rectal Surgery [address] [facility] [address] [phone]      MTM referral needed and placed by this provider: No    Pending labs: hgb on 1/17/18  [facility] labs   CBC RESULTS:   Recent Labs   Lab Test 12/29/17 12/27/17   0729   12/22/17   0729  12/21/17   0611   WBC  10.5   --    --    --   9.3   RBC  3.73*   --    --    --   4.07   HGB  8.4*   --    --   8.8*  9.3*   HCT  27.6*   --    --    --   31.0*   MCV  74*   --    --    --   76*   MCH  22.5*   --    --    --   22.9*   MCHC  30.4*   --    --    --   30.0*   RDW  19.2*   --    --    --   18.1*   PLT  252  280   < >  239  257    < > = values in this interval not displayed.       Last Basic Metabolic Panel:  Recent Labs   Lab Test 01/11/18 12/29/17   NA  140  140   POTASSIUM  4.2  2.7*   CHLORIDE  108  104   LUPE  8.7  7.5*   CO2  24  28   BUN  15  5*   CR  0.72  0.58   GLC  102*  102*       Liver Function Studies -   Recent Labs   Lab Test  12/20/17   0940  12/06/17   0350   PROTTOTAL  7.0  6.6*   ALBUMIN  2.8*  3.0*   BILITOTAL  0.8  0.2   ALKPHOS  81  90   AST  16  10   ALT  12  15     Discharge Treatments:Tansverse Loop Colostomy Change 2 x a week and PRN *Change  pouch when 1/3 full of stool/gas* Supplies: Barrier #[id number] Pouch #[id number] OR #[id number] Ring #[id number]       TOTAL DISCHARGE TIME:   Greater than 30 minutes  Electronically signed by:  KAJAL Desir CNP

## 2018-01-16 NOTE — LETTER
1/16/2018        RE: Mary Grace Rivers  [address]  [place] [facility] [place] [address]          [place] [facility] DISCHARGE SUMMARY    PATIENT'S NAME: Mary Grace Rivers  YOB: 1925  MEDICAL RECORD NUMBER:  [id number]    PRIMARY CARE PROVIDER AND CLINIC RESPONSIBLE AFTER TRANSFER: [facility]    CODE STATUS/ADVANCE DIRECTIVES DISCUSSION:   CPR/Full code        Allergies   Allergen Reactions     Penicillins        TRANSFERRING PROVIDERS: KAJAL Desir CNP, Venancio Greenfield MD  DATE OF SNF ADMISSION:  December / 27 / 2017  DATE OF SNF (anticipated) DISCHARGE: January / 17 / 2018  DISCHARGE DISPOSITION: [facility] Facility: [facility] stay 12/20/2017 to 12/27/2017.     Condition on Discharge:  Improving.  Function:  Needs assist with ADLs  Cognitive Scores: SLUMS 20/30 and CPT 4.4/5.6    Equipment: walker    DISCHARGE DIAGNOSIS:   1. Malignant neoplasm of splenic flexure (H)    2. S/P colostomy (H)    3. Hypokalemia    4. Anemia due to blood loss, acute    5. Essential hypertension    6. Physical deconditioning    7. Knee pain, unspecified chronicity, unspecified laterality        HPI Nursing Facility Course:  HPI information obtained from: facility chart records, facility staff, patient report and [facility] chart review.      Malignant neoplasm of splenic flexure (H)  S/P colostomy (H)  Patient transferred to TCU from hospital after exploratory lap done due to colonic obstruction. She was found to have an underlying mass.. A transverse loop colostomy was placed. Post surgery complications included acute hypoxic respiratory failure requiring high flow oxygen. Imaging negative for ptx, consolidation, pleural effusion. ? Due to dilaudid.   Diet was advanced. Ostomy is functioning function. Weaned off of oxygen. She was transferred to TCU for ongoing recovery. Pathology report showed moderately differentiated  adenocarcinoma.   [facility] nurse did consult in hospital. Family and patient have been learning how to change ostomy bag.   DVT prophylaxis was lovenox. She is walking length of hallway with walker.   Hypokalemia  K on 12/29 was 2.7. No documentation that any one was notified. No supplement given.   K on 1/3/18 3.5  Anemia due to blood loss, acute  hgb went from 11.5 to 8.8 on 12/22  Hemoglobin   Date Value Ref Range Status   12/29/2017 8.4 (L) 11.7 - 15.7 g/dL Final   12/22/2017 8.8 (L) 11.7 - 15.7 g/dL Final   ]  Essential hypertension  BP's: 131/70, 121/61, 109/63    Physical deconditioning  Lives alone in apartment, senior apartment, [facility]. Uses a walker.   She has daughter here.  She recently moved to [place], I think from [place]     Knee pain  Patient was started on colchicine and ibuprofen early on in TCU stay due to knee pain. Today she reports some knee pain. Colchicine has been discontinued.    PAST MEDICAL HISTORY:  has a past medical history of Cancer (H); Depressive disorder; and Hypertension.    DISCHARGE MEDICATIONS:  Current Outpatient Prescriptions   Medication Sig Dispense Refill     simethicone (MYLICON) 80 MG chewable tablet Take 1 tablet (80 mg) by mouth every 6 hours as needed for cramping 180 tablet      Amlodipine-Olmesartan (MOSES) 10-40 MG TABS Take 1 tablet by mouth daily       Acetaminophen (TYLENOL PO) Take 650 mg by mouth 4 times daily AND may have 650 mg po once daily prn         MEDICATION CHANGES/RATIONALE:   12/29 colchicine 0.6mg BID and ibuprofen 400mg q 8 h prn for left knee pain  1/3/18 DISCONTINUE colchicine  1/16/18 DISCONTINUE lovenox and ibuprofen  Controlled medications sent with patient: not applicable/none     ROS:    4 point ROS including Respiratory, CV, GI and , other than that noted in the HPI,  is negative    Physical Exam:   Vitals: /70  Pulse 94  Temp 98.5  F (36.9  C)  Resp 16  Wt 121 lb 6.4 oz (55.1 kg)  SpO2 96%  BMI 25.37 kg/m2  BMI= Body  mass index is 25.37 kg/(m^2).    GENERAL APPEARANCE:  Alert, in no distress  ENT:  Mouth and posterior oropharynx normal, moist mucous membranes, hearing acuity adequate   EYES:  EOM, conjunctivae, lids, pupils and irises normal  RESP:  respiratory effort and palpation of chest normal, no respiratory distress,  CV: , Edema none  ABDOMEN:  normal bowel sounds, soft, nontender, colostomy with watery stool  M/S:   Gait and station steady with walker, Digits and nails normal   SKIN:  Inspection/Palpation of skin and subcutaneous tissue ostomy is beefy red  NEURO: 2-12 in normal limits and at patient's baseline  PSYCH:  insight and judgement, memory some impairment , affect and mood normal    DISCHARGE PLAN:  Occupational Therapy, Physical Therapy, Registered Nurse, Home Health Aide and From:  [facility]  Patient instructed to follow-up with:  PCP in 7 days    Thurs Jan 18th 2018 1030a Colon Rectal Surgery [address] [facility] [address] [phone]      MTM referral needed and placed by this provider: No    Pending labs: hgb on 1/17/18  [facility] labs   CBC RESULTS:   Recent Labs   Lab Test 12/29/17 12/27/17   0729   12/22/17   0729  12/21/17   0611   WBC  10.5   --    --    --   9.3   RBC  3.73*   --    --    --   4.07   HGB  8.4*   --    --   8.8*  9.3*   HCT  27.6*   --    --    --   31.0*   MCV  74*   --    --    --   76*   MCH  22.5*   --    --    --   22.9*   MCHC  30.4*   --    --    --   30.0*   RDW  19.2*   --    --    --   18.1*   PLT  252  280   < >  239  257    < > = values in this interval not displayed.       Last Basic Metabolic Panel:  Recent Labs   Lab Test 01/11/18 12/29/17   NA  140  140   POTASSIUM  4.2  2.7*   CHLORIDE  108  104   LUPE  8.7  7.5*   CO2  24  28   BUN  15  5*   CR  0.72  0.58   GLC  102*  102*       Liver Function Studies -   Recent Labs   Lab Test  12/20/17   0940  12/06/17   0350   PROTTOTAL  7.0  6.6*   ALBUMIN  2.8*  3.0*   BILITOTAL  0.8  0.2   ALKPHOS  81  90   AST  16  10   ALT   12  15     Discharge Treatments:Tansverse Loop Colostomy Change 2 x a week and PRN *Change pouch when 1/3 full of stool/gas* Supplies: Barrier #[id number] Pouch #[id number] OR #[id number] Ring #[id number]       TOTAL DISCHARGE TIME:   Greater than 30 minutes  Electronically signed by:  KAJAL Desir CNP      Sincerely,        KAJAL Desir CNP

## 2021-12-23 NOTE — ED NOTES
Bed: ED05  Expected date:   Expected time:   Means of arrival:   Comments:  Alfred 438  89/F   ETOH, fall  
Pt's daughter, Mili called back from RN calling to inform daughter that pt was brought in overnight for minor fall, UTI, intoxication  
Road tested PT. PT did not complain of pain, steady gait with a walker. PT states she uses a walker at home. MD notified.  
English

## 2022-05-05 NOTE — PLAN OF CARE
Problem: Patient Care Overview  Goal: Plan of Care/Patient Progress Review  Outcome: No Change  Alert and oriented x4 . [place] .communicate needs . Vital signs stable . Continent of B&B .       15

## (undated) DEVICE — GLOVE PROTEXIS BLUE W/NEU-THERA 7.0  2D73EB70

## (undated) DEVICE — GLOVE PROTEXIS POWDER FREE 6.0 ORTHOPEDIC 2D73ET60

## (undated) DEVICE — ENDO CANNULA 05MM VERSAONE UNIVERSAL UNVCA5STF

## (undated) DEVICE — LINEN TOWEL PACK X5 5464

## (undated) DEVICE — SU MONOCRYL 4-0 PS-2 18" UND Y496G

## (undated) DEVICE — SUCTION TIP YANKAUER STR K87

## (undated) DEVICE — ESU PENCIL W/HOLSTER E2350H

## (undated) DEVICE — SU VICRYL 2-0 TIE 12X18" J905T

## (undated) DEVICE — SOL NACL 0.9% IRRIG 1000ML BOTTLE 07138-09

## (undated) DEVICE — GLOVE PROTEXIS W/NEU-THERA 7.0  2D73TE70

## (undated) DEVICE — PREP CHLORAPREP 26ML TINTED ORANGE  260815

## (undated) DEVICE — DRAPE LEGGINGS 8421

## (undated) DEVICE — SYR BULB IRRIG 50ML LATEX FREE 0035280

## (undated) DEVICE — ESU LIGASURE LAPAROSCOPIC BLUNT TIP SEALER 5MMX37CM LF1837

## (undated) DEVICE — SU VICRYL 3-0 SH CR 8X18" J774

## (undated) DEVICE — SUCTION CANISTER MEDIVAC LINER 3000ML W/LID 65651-530

## (undated) DEVICE — Device

## (undated) DEVICE — PACK LAP CHOLE SLC15LCFSD

## (undated) DEVICE — SU VICRYL 0 UR-6 27" J603H

## (undated) DEVICE — KIT PATIENT POSITIONING PIGAZZI LATEX FREE 40580

## (undated) DEVICE — SU DERMABOND MINI DHVM12

## (undated) DEVICE — SU PDS II 1 CT MONOFIL Z353H

## (undated) DEVICE — ESU CORD MONOPOLAR 10'  E0510

## (undated) DEVICE — SU VICRYL 3-0 SH 27" J316H

## (undated) DEVICE — ESU GROUND PAD UNIVERSAL W/O CORD

## (undated) DEVICE — ESU HOLDER LAP INST DISP PURPLE LONG 330MM H-PRO-330

## (undated) DEVICE — ENDO TROCAR OPTICAL 05MM VERSAPORT PLUS W/FIX CAN ONB5STF

## (undated) DEVICE — SOL NACL 0.9% INJ 1000ML BAG 2B1324X

## (undated) DEVICE — DRAPE IOBAN INCISE 23X17" 6650EZ

## (undated) DEVICE — ENDO TROCAR BLUNT 100MM W/THRD ANCHOR BLUNTPORT BPT12STS

## (undated) RX ORDER — FENTANYL CITRATE 50 UG/ML
INJECTION, SOLUTION INTRAMUSCULAR; INTRAVENOUS
Status: DISPENSED
Start: 2017-01-01

## (undated) RX ORDER — BUPIVACAINE HYDROCHLORIDE AND EPINEPHRINE 5; 5 MG/ML; UG/ML
INJECTION, SOLUTION EPIDURAL; INTRACAUDAL; PERINEURAL
Status: DISPENSED
Start: 2017-01-01

## (undated) RX ORDER — ACETAMINOPHEN 10 MG/ML
INJECTION, SOLUTION INTRAVENOUS
Status: DISPENSED
Start: 2017-01-01

## (undated) RX ORDER — LIDOCAINE HYDROCHLORIDE 20 MG/ML
INJECTION, SOLUTION EPIDURAL; INFILTRATION; INTRACAUDAL; PERINEURAL
Status: DISPENSED
Start: 2017-01-01

## (undated) RX ORDER — ALBUTEROL SULFATE 0.83 MG/ML
SOLUTION RESPIRATORY (INHALATION)
Status: DISPENSED
Start: 2017-01-01

## (undated) RX ORDER — DEXAMETHASONE SODIUM PHOSPHATE 4 MG/ML
INJECTION, SOLUTION INTRA-ARTICULAR; INTRALESIONAL; INTRAMUSCULAR; INTRAVENOUS; SOFT TISSUE
Status: DISPENSED
Start: 2017-01-01

## (undated) RX ORDER — HYDROMORPHONE HYDROCHLORIDE 1 MG/ML
INJECTION, SOLUTION INTRAMUSCULAR; INTRAVENOUS; SUBCUTANEOUS
Status: DISPENSED
Start: 2017-01-01

## (undated) RX ORDER — PROPOFOL 10 MG/ML
INJECTION, EMULSION INTRAVENOUS
Status: DISPENSED
Start: 2017-01-01